# Patient Record
Sex: FEMALE | Race: WHITE | NOT HISPANIC OR LATINO | Employment: OTHER | URBAN - METROPOLITAN AREA
[De-identification: names, ages, dates, MRNs, and addresses within clinical notes are randomized per-mention and may not be internally consistent; named-entity substitution may affect disease eponyms.]

---

## 2017-03-06 PROBLEM — R91.8 LUNG MASS: Status: ACTIVE | Noted: 2017-03-06

## 2017-03-22 ENCOUNTER — OFFICE VISIT (OUTPATIENT)
Dept: PULMONOLOGY | Facility: CLINIC | Age: 57
End: 2017-03-22
Payer: COMMERCIAL

## 2017-03-22 VITALS
DIASTOLIC BLOOD PRESSURE: 83 MMHG | HEART RATE: 71 BPM | HEIGHT: 63 IN | SYSTOLIC BLOOD PRESSURE: 156 MMHG | BODY MASS INDEX: 28.64 KG/M2 | OXYGEN SATURATION: 100 % | WEIGHT: 161.63 LBS

## 2017-03-22 DIAGNOSIS — R91.8 LUNG MASS: Primary | ICD-10-CM

## 2017-03-22 DIAGNOSIS — R05.3 CHRONIC COUGH: ICD-10-CM

## 2017-03-22 PROCEDURE — 1160F RVW MEDS BY RX/DR IN RCRD: CPT | Mod: S$GLB,,, | Performed by: INTERNAL MEDICINE

## 2017-03-22 PROCEDURE — 99999 PR PBB SHADOW E&M-EST. PATIENT-LVL III: CPT | Mod: PBBFAC,,, | Performed by: INTERNAL MEDICINE

## 2017-03-22 PROCEDURE — 3079F DIAST BP 80-89 MM HG: CPT | Mod: S$GLB,,, | Performed by: INTERNAL MEDICINE

## 2017-03-22 PROCEDURE — 99204 OFFICE O/P NEW MOD 45 MIN: CPT | Mod: S$GLB,,, | Performed by: INTERNAL MEDICINE

## 2017-03-22 PROCEDURE — 3077F SYST BP >= 140 MM HG: CPT | Mod: S$GLB,,, | Performed by: INTERNAL MEDICINE

## 2017-03-22 RX ORDER — FLUTICASONE PROPIONATE 50 MCG
2 SPRAY, SUSPENSION (ML) NASAL DAILY
Qty: 16 G | Refills: 6 | Status: SHIPPED | OUTPATIENT
Start: 2017-03-22 | End: 2017-04-21

## 2017-03-22 RX ORDER — CYANOCOBALAMIN 1000 UG/ML
INJECTION, SOLUTION INTRAMUSCULAR; SUBCUTANEOUS
Refills: 0 | COMMUNITY
Start: 2017-02-09 | End: 2017-05-02 | Stop reason: SDUPTHER

## 2017-03-22 NOTE — PROGRESS NOTES
"Subjective:       Patient ID: Alondra Bryan is a 56 y.o. female.    Chief Complaint: Lung Mass    HPI Comments: 56 year old here for second opinion.  Went ot PCP in February with a 6-7 month history of cough that prompted CT of sinsuses and CXR.  Treated with antibiotics.  Cough has improved and is now just a "tickle".  20 pack year history of tobacco use, but quit 20 years ago.      Review of Systems   Constitutional: Negative for fever, chills and night sweats.   HENT: Negative for trouble swallowing.    Eyes: Negative for itching.   Respiratory: Positive for cough and wheezing (once a week). Negative for hemoptysis, chest tightness, shortness of breath and dyspnea on extertion.    Cardiovascular: Negative for chest pain and leg swelling.   Endocrine: Negative for cold intolerance and heat intolerance.    Musculoskeletal: Negative for arthralgias.   Skin: Negative for rash.   Gastrointestinal: Positive for acid reflux (takes protonix every other day for which it controls).   Neurological: Negative for headaches.   Hematological: Negative for adenopathy.   Psychiatric/Behavioral: Negative for confusion.       Past medical and surgical history reviewed.  Social and family history reviewed.  Allergies and medications reviewed.  No past history of pneumonia or thoracic surgery.  Cholecystectomy and hysterectomy and sinus surgery.  Objective:       Vitals:    03/22/17 1100   BP: (!) 156/83   Pulse: 71   SpO2: 100%   Weight: 73.3 kg (161 lb 9.6 oz)   Height: 5' 3" (1.6 m)     Physical Exam   Constitutional: She is oriented to person, place, and time. She appears well-developed and well-nourished.   HENT:   Head: Normocephalic.   Nose: Nose normal.   Neck: Normal range of motion. Neck supple. No tracheal deviation present.   Cardiovascular: Normal rate, regular rhythm, normal heart sounds and intact distal pulses.    Pulmonary/Chest: Normal expansion, symmetric chest wall expansion, effort normal and breath sounds normal. " "  Abdominal: Soft. Bowel sounds are normal. There is no hepatosplenomegaly.   Musculoskeletal: Normal range of motion. She exhibits no edema.   Lymphadenopathy: No supraclavicular adenopathy is present.     She has no cervical adenopathy.   Neurological: She is alert and oriented to person, place, and time. No cranial nerve deficit.   Skin: Skin is warm and dry.   Psychiatric: She has a normal mood and affect.     Personal Diagnostic Review  CT of chest 2/14/2017:  Right lower lobe mass with airways leading up to it.  Possible "comet tail".  Central calcifications.  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    A positive BCL-2   stain would have further supported that but it is negative.  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   No flowsheet data found.      Assessment:       1. Lung mass    2. Chronic cough        Outpatient Encounter Prescriptions as of 3/22/2017   Medication Sig Dispense Refill    ARMOUR THYROID 15 mg Tab once daily.       ARMOUR THYROID 60 mg Tab once daily.   3    b complex vitamins tablet Take 1 tablet by mouth once daily.        COMPOUND HORMONE REPLACEMENT Take by mouth once daily. biest (50/50) prog/test/dhea 2/5/150/1.5/5mg in each arm      cyanocobalamin 1,000 mcg/mL injection   0    doxycycline (ORACEA) 40 mg capsule Take 40 mg by mouth every other day.       lisinopril 10 MG tablet TAKE 1 TABLET BY MOUTH ONCE A DAY 30 tablet 12    meloxicam (MOBIC) 15 MG tablet Take 15 mg by mouth as needed.   12    METHYL-B12/L-MEFOLATE/B6 PHOS (LEVMEFOLATE-B6 PHOS-METHYL-B12 ORAL) Take 1 tablet by mouth once daily.      multivitamin with folic acid (ONE DAILY ESSENTIAL) 400 mcg Tab Take 1 tablet by mouth once daily.      pantoprazole (PROTONIX) 40 MG tablet Take 1 tablet (40 mg total) by " mouth once daily. 30 tablet 0    SOOLANTRA 1 % Crea every other day.       VITAMIN D2 50,000 unit capsule 50,000 Units every 7 days.   0    fluticasone (FLONASE) 50 mcg/actuation nasal spray 2 sprays by Each Nare route once daily. 16 g 6     No facility-administered encounter medications on file as of 3/22/2017.      Orders Placed This Encounter   Procedures    NM PET CT Routine Skull to Mid Thigh     Standing Status:   Future     Standing Expiration Date:   3/22/2018     Order Specific Question:   May the Radiologist modify the order per protocol to meet the clinical needs of the patient?     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  PET/CT  to evaluate for hypermetabolic activity, will need further diagnostic studies if it is.  Flonase for sinus congestion.    Call with results to determine next best.

## 2017-03-28 ENCOUNTER — PATIENT MESSAGE (OUTPATIENT)
Dept: PULMONOLOGY | Facility: CLINIC | Age: 57
End: 2017-03-28

## 2017-03-28 ENCOUNTER — HOSPITAL ENCOUNTER (OUTPATIENT)
Dept: RADIOLOGY | Facility: HOSPITAL | Age: 57
Discharge: HOME | End: 2017-03-28
Attending: INTERNAL MEDICINE
Payer: COMMERCIAL

## 2017-03-28 ENCOUNTER — TELEPHONE (OUTPATIENT)
Dept: PULMONOLOGY | Facility: CLINIC | Age: 57
End: 2017-03-28

## 2017-03-28 DIAGNOSIS — R91.8 LUNG MASS: ICD-10-CM

## 2017-03-28 PROCEDURE — A9552 F18 FDG: HCPCS | Mod: PO

## 2017-03-28 PROCEDURE — 78815 PET IMAGE W/CT SKULL-THIGH: CPT | Mod: 26,PI,, | Performed by: RADIOLOGY

## 2017-03-29 ENCOUNTER — TELEPHONE (OUTPATIENT)
Dept: PULMONOLOGY | Facility: CLINIC | Age: 57
End: 2017-03-29

## 2017-03-29 DIAGNOSIS — R91.8 LUNG MASS: Primary | ICD-10-CM

## 2017-03-29 NOTE — TELEPHONE ENCOUNTER
----- Message from Tricia Hutton sent at 3/29/2017  9:35 AM CDT -----  Contact: Self [phone] (m)  Pt states she missed your call - she is requesting a call at [phone] (m).

## 2017-04-11 ENCOUNTER — ANESTHESIA (OUTPATIENT)
Dept: SURGERY | Facility: HOSPITAL | Age: 57
End: 2017-04-11
Payer: COMMERCIAL

## 2017-04-11 ENCOUNTER — HOSPITAL ENCOUNTER (OUTPATIENT)
Dept: RADIOLOGY | Facility: HOSPITAL | Age: 57
Discharge: HOME | End: 2017-04-11
Attending: INTERNAL MEDICINE | Admitting: INTERNAL MEDICINE
Payer: COMMERCIAL

## 2017-04-11 ENCOUNTER — HOSPITAL ENCOUNTER (OUTPATIENT)
Facility: HOSPITAL | Age: 57
Discharge: HOME | End: 2017-04-11
Attending: INTERNAL MEDICINE | Admitting: INTERNAL MEDICINE
Payer: COMMERCIAL

## 2017-04-11 ENCOUNTER — SURGERY (OUTPATIENT)
Age: 57
End: 2017-04-11

## 2017-04-11 ENCOUNTER — ANESTHESIA EVENT (OUTPATIENT)
Dept: SURGERY | Facility: HOSPITAL | Age: 57
End: 2017-04-11
Payer: COMMERCIAL

## 2017-04-11 VITALS
SYSTOLIC BLOOD PRESSURE: 128 MMHG | TEMPERATURE: 98 F | WEIGHT: 160 LBS | BODY MASS INDEX: 28.35 KG/M2 | DIASTOLIC BLOOD PRESSURE: 70 MMHG | HEIGHT: 63 IN | RESPIRATION RATE: 16 BRPM | HEART RATE: 75 BPM | OXYGEN SATURATION: 99 %

## 2017-04-11 DIAGNOSIS — R91.8 RIGHT LOWER LOBE LUNG MASS: ICD-10-CM

## 2017-04-11 DIAGNOSIS — R91.8 LUNG MASS: ICD-10-CM

## 2017-04-11 PROCEDURE — 25000003 PHARM REV CODE 250: Performed by: INTERNAL MEDICINE

## 2017-04-11 PROCEDURE — 88172 CYTP DX EVAL FNA 1ST EA SITE: CPT | Performed by: PATHOLOGY

## 2017-04-11 PROCEDURE — 25000003 PHARM REV CODE 250: Performed by: STUDENT IN AN ORGANIZED HEALTH CARE EDUCATION/TRAINING PROGRAM

## 2017-04-11 PROCEDURE — 27201423 OPTIME MED/SURG SUP & DEVICES STERILE SUPPLY: Performed by: INTERNAL MEDICINE

## 2017-04-11 PROCEDURE — 36000706: Performed by: INTERNAL MEDICINE

## 2017-04-11 PROCEDURE — D9220A PRA ANESTHESIA: Mod: ,,, | Performed by: ANESTHESIOLOGY

## 2017-04-11 PROCEDURE — 63600175 PHARM REV CODE 636 W HCPCS: Performed by: STUDENT IN AN ORGANIZED HEALTH CARE EDUCATION/TRAINING PROGRAM

## 2017-04-11 PROCEDURE — 88305 TISSUE EXAM BY PATHOLOGIST: CPT | Performed by: PATHOLOGY

## 2017-04-11 PROCEDURE — 71000045 HC DOSC ROUTINE RECOVERY EA ADD'L HR: Performed by: INTERNAL MEDICINE

## 2017-04-11 PROCEDURE — 71000044 HC DOSC ROUTINE RECOVERY FIRST HOUR: Performed by: INTERNAL MEDICINE

## 2017-04-11 PROCEDURE — 31627 NAVIGATIONAL BRONCHOSCOPY: CPT | Mod: ,,, | Performed by: INTERNAL MEDICINE

## 2017-04-11 PROCEDURE — 88305 TISSUE EXAM BY PATHOLOGIST: CPT | Mod: 26,59,, | Performed by: PATHOLOGY

## 2017-04-11 PROCEDURE — 36000707: Performed by: INTERNAL MEDICINE

## 2017-04-11 PROCEDURE — 88305 TISSUE EXAM BY PATHOLOGIST: CPT | Mod: 26,,, | Performed by: PATHOLOGY

## 2017-04-11 PROCEDURE — 37000009 HC ANESTHESIA EA ADD 15 MINS: Performed by: INTERNAL MEDICINE

## 2017-04-11 PROCEDURE — 71250 CT THORAX DX C-: CPT | Mod: TC

## 2017-04-11 PROCEDURE — 71000015 HC POSTOP RECOV 1ST HR: Performed by: INTERNAL MEDICINE

## 2017-04-11 PROCEDURE — 31652 BRONCH EBUS SAMPLNG 1/2 NODE: CPT | Mod: ,,, | Performed by: INTERNAL MEDICINE

## 2017-04-11 PROCEDURE — 31629 BRONCHOSCOPY/NEEDLE BX EACH: CPT | Mod: 59,RT,, | Performed by: INTERNAL MEDICINE

## 2017-04-11 PROCEDURE — 88173 CYTOPATH EVAL FNA REPORT: CPT | Mod: 26,,, | Performed by: PATHOLOGY

## 2017-04-11 PROCEDURE — 71250 CT THORAX DX C-: CPT | Mod: 26,,, | Performed by: RADIOLOGY

## 2017-04-11 PROCEDURE — 88172 CYTP DX EVAL FNA 1ST EA SITE: CPT | Mod: 26,,, | Performed by: PATHOLOGY

## 2017-04-11 PROCEDURE — 31628 BRONCHOSCOPY/LUNG BX EACH: CPT | Mod: 51,RT,, | Performed by: INTERNAL MEDICINE

## 2017-04-11 PROCEDURE — 37000008 HC ANESTHESIA 1ST 15 MINUTES: Performed by: INTERNAL MEDICINE

## 2017-04-11 RX ORDER — LIDOCAINE HCL/PF 100 MG/5ML
SYRINGE (ML) INTRAVENOUS
Status: DISCONTINUED | OUTPATIENT
Start: 2017-04-11 | End: 2017-04-11

## 2017-04-11 RX ORDER — PHENYLEPHRINE HYDROCHLORIDE 10 MG/ML
INJECTION INTRAVENOUS
Status: DISCONTINUED | OUTPATIENT
Start: 2017-04-11 | End: 2017-04-11

## 2017-04-11 RX ORDER — ONDANSETRON 2 MG/ML
INJECTION INTRAMUSCULAR; INTRAVENOUS
Status: DISCONTINUED | OUTPATIENT
Start: 2017-04-11 | End: 2017-04-11

## 2017-04-11 RX ORDER — MIDAZOLAM HYDROCHLORIDE 1 MG/ML
INJECTION, SOLUTION INTRAMUSCULAR; INTRAVENOUS
Status: DISCONTINUED | OUTPATIENT
Start: 2017-04-11 | End: 2017-04-11

## 2017-04-11 RX ORDER — SODIUM CHLORIDE 0.9 % (FLUSH) 0.9 %
3 SYRINGE (ML) INJECTION EVERY 8 HOURS
Status: DISCONTINUED | OUTPATIENT
Start: 2017-04-11 | End: 2017-04-11 | Stop reason: HOSPADM

## 2017-04-11 RX ORDER — LIDOCAINE HYDROCHLORIDE 10 MG/ML
INJECTION INFILTRATION; PERINEURAL
Status: DISCONTINUED | OUTPATIENT
Start: 2017-04-11 | End: 2017-04-11 | Stop reason: HOSPADM

## 2017-04-11 RX ORDER — FENTANYL CITRATE 50 UG/ML
25 INJECTION, SOLUTION INTRAMUSCULAR; INTRAVENOUS EVERY 5 MIN PRN
Status: DISCONTINUED | OUTPATIENT
Start: 2017-04-11 | End: 2017-04-11 | Stop reason: HOSPADM

## 2017-04-11 RX ORDER — SODIUM CHLORIDE 9 MG/ML
INJECTION, SOLUTION INTRAVENOUS CONTINUOUS PRN
Status: DISCONTINUED | OUTPATIENT
Start: 2017-04-11 | End: 2017-04-11

## 2017-04-11 RX ORDER — FENTANYL CITRATE 50 UG/ML
INJECTION, SOLUTION INTRAMUSCULAR; INTRAVENOUS
Status: DISCONTINUED | OUTPATIENT
Start: 2017-04-11 | End: 2017-04-11

## 2017-04-11 RX ORDER — PROPOFOL 10 MG/ML
VIAL (ML) INTRAVENOUS
Status: DISCONTINUED | OUTPATIENT
Start: 2017-04-11 | End: 2017-04-11

## 2017-04-11 RX ORDER — CYANOCOBALAMIN 1000 UG/ML
1000 INJECTION, SOLUTION INTRAMUSCULAR; SUBCUTANEOUS
COMMUNITY

## 2017-04-11 RX ORDER — SODIUM CHLORIDE 0.9 % (FLUSH) 0.9 %
3 SYRINGE (ML) INJECTION
Status: DISCONTINUED | OUTPATIENT
Start: 2017-04-11 | End: 2017-04-11 | Stop reason: HOSPADM

## 2017-04-11 RX ADMIN — FENTANYL CITRATE 25 MCG: 50 INJECTION, SOLUTION INTRAMUSCULAR; INTRAVENOUS at 09:04

## 2017-04-11 RX ADMIN — FENTANYL CITRATE 25 MCG: 50 INJECTION, SOLUTION INTRAMUSCULAR; INTRAVENOUS at 10:04

## 2017-04-11 RX ADMIN — LIDOCAINE HYDROCHLORIDE 16 ML: 10 INJECTION, SOLUTION INFILTRATION; PERINEURAL at 09:04

## 2017-04-11 RX ADMIN — PHENYLEPHRINE HYDROCHLORIDE 150 MCG: 10 INJECTION INTRAVENOUS at 10:04

## 2017-04-11 RX ADMIN — FENTANYL CITRATE 25 MCG: 50 INJECTION INTRAMUSCULAR; INTRAVENOUS at 11:04

## 2017-04-11 RX ADMIN — SODIUM CHLORIDE: 0.9 INJECTION, SOLUTION INTRAVENOUS at 08:04

## 2017-04-11 RX ADMIN — PROPOFOL 130 MG: 10 INJECTION, EMULSION INTRAVENOUS at 09:04

## 2017-04-11 RX ADMIN — PHENYLEPHRINE HYDROCHLORIDE 100 MCG: 10 INJECTION INTRAVENOUS at 10:04

## 2017-04-11 RX ADMIN — FENTANYL CITRATE 50 MCG: 50 INJECTION, SOLUTION INTRAMUSCULAR; INTRAVENOUS at 09:04

## 2017-04-11 RX ADMIN — MIDAZOLAM HYDROCHLORIDE 2 MG: 1 INJECTION, SOLUTION INTRAMUSCULAR; INTRAVENOUS at 08:04

## 2017-04-11 RX ADMIN — PHENYLEPHRINE HYDROCHLORIDE 150 MCG: 10 INJECTION INTRAVENOUS at 09:04

## 2017-04-11 RX ADMIN — PHENYLEPHRINE HYDROCHLORIDE 100 MCG: 10 INJECTION INTRAVENOUS at 09:04

## 2017-04-11 RX ADMIN — LIDOCAINE HYDROCHLORIDE 70 MG: 20 INJECTION, SOLUTION INTRAVENOUS at 09:04

## 2017-04-11 RX ADMIN — ONDANSETRON 4 MG: 2 INJECTION INTRAMUSCULAR; INTRAVENOUS at 10:04

## 2017-04-11 NOTE — IP AVS SNAPSHOT
[facility] [address] [facility]  [address]  [facility] [address]  Phone: [phone]           Patient Discharge Instructions   Our goal is to set you up for success. This packet includes information on your condition, medications, and your home care.  It will help you care for yourself to prevent having to return to the hospital.     Please ask your nurse if you have any questions.      There are many details to remember when preparing to leave the hospital. Here is what you will need to do:    1. Take your medicine. If you are prescribed medications, review your Medication List on the following pages. You may have new medications to  at the pharmacy and others that you'll need to stop taking. Review the instructions for how and when to take your medications. Talk with your doctor or nurses if you are unsure of what to do.     2. Go to your follow-up appointments. Specific follow-up information is listed in the following pages. Your may be contacted by a nurse or clinical provider about future appointments. Be sure we have all of the phone numbers to reach you. Please contact your provider's office if you are unable to make an appointment.     3. Watch for warning signs. Your doctor or nurse will give you detailed warning signs to watch for and when to call for assistance. These instructions may also include educational information about your condition. If you experience any of warning signs to your health, call your doctor.           [facility]  Unless otherwise directed by your provider, please   contact [facility], our nurse care line   that is available for 24/7 assistance.     [phone] (toll-free)     Registered nurses in the [facility]   provide: appointment scheduling, clinical advisement, health education, and other advisory services.                  ** Verify the list of medication(s) below is accurate and up to date. Carry this with you in case of  emergency. If your medications have changed, please notify your healthcare provider.             Medication List      CONTINUE taking these medications        Additional Info                      * ARMOUR THYROID 15 mg Tab   Refills:  0   Generic drug:  thyroid (pork)    Instructions:  once daily.     Begin Date    AM    Noon    PM    Bedtime       * ARMOUR THYROID 60 mg Tab   Refills:  3   Generic drug:  thyroid (pork)    Instructions:  once daily.     Begin Date    AM    Noon    PM    Bedtime       COMPOUND HORMONE REPLACEMENT   Refills:  0    Instructions:  Take by mouth once daily. biest (50/50) prog/test/dhea 2/5/150/1.5/5mg in each arm     Begin Date    AM    Noon    PM    Bedtime       * cyanocobalamin 1,000 mcg/mL injection   Refills:  0   Dose:  1000 mcg    Instructions:  1,000 mcg every 28 days.     Begin Date    AM    Noon    PM    Bedtime       * cyanocobalamin 1,000 mcg/mL injection   Refills:  0      Begin Date    AM    Noon    PM    Bedtime       doxycycline 40 mg capsule   Commonly known as:  ORACEA   Refills:  0   Dose:  40 mg    Instructions:  Take 40 mg by mouth every other day.     Begin Date    AM    Noon    PM    Bedtime       fluticasone 50 mcg/actuation nasal spray   Commonly known as:  FLONASE   Quantity:  16 g   Refills:  6   Dose:  2 spray    Instructions:  2 sprays by Each Nare route once daily.     Begin Date    AM    Noon    PM    Bedtime       LEVMEFOLATE-B6 PHOS-METHYL-B12 ORAL   Refills:  0   Dose:  1 tablet    Instructions:  Take 1 tablet by mouth once daily.     Begin Date    AM    Noon    PM    Bedtime       lisinopril 10 MG tablet   Quantity:  30 tablet   Refills:  12    Instructions:  TAKE 1 TABLET BY MOUTH ONCE A DAY     Begin Date    AM    Noon    PM    Bedtime       meloxicam 15 MG tablet   Commonly known as:  MOBIC   Refills:  12   Dose:  15 mg    Instructions:  Take 15 mg by mouth as needed.     Begin Date    AM    Noon    PM    Bedtime       ONE DAILY ESSENTIAL 400 mcg Tab    Refills:  0   Dose:  1 tablet   Generic drug:  multivitamin with folic acid    Instructions:  Take 1 tablet by mouth once daily.     Begin Date    AM    Noon    PM    Bedtime       pantoprazole 40 MG tablet   Commonly known as:  PROTONIX   Quantity:  30 tablet   Refills:  0   Dose:  40 mg    Instructions:  Take 1 tablet (40 mg total) by mouth once daily.     Begin Date    AM    Noon    PM    Bedtime       SOOLANTRA 1 % Crea   Refills:  0   Generic drug:  ivermectin    Instructions:  every other day.     Begin Date    AM    Noon    PM    Bedtime       VITAMIN D2 50,000 unit Cap   Refills:  0   Dose:  25925 Units   Generic drug:  ergocalciferol    Instructions:  50,000 Units every 7 days.     Begin Date    AM    Noon    PM    Bedtime       * Notice:  This list has 4 medication(s) that are the same as other medications prescribed for you. Read the directions carefully, and ask your doctor or other care provider to review them with you.               Please bring to all follow up appointments:    1. A copy of your discharge instructions.  2. All medicines you are currently taking in their original bottles.  3. Identification and insurance card.    Please arrive 15 minutes ahead of scheduled appointment time.    Please call 24 hours in advance if you must reschedule your appointment and/or time.        Follow-up Information     Call in 1 week to follow up.        Discharge Instructions     Future Orders    Diet general     Questions:    Total calories:      Fat restriction, if any:      Protein restriction, if any:      Na restriction, if any:      Fluid restriction:      Additional restrictions:          Discharge Instructions         Endoscopic Diagnosis of Chest, Lung Problems  Youve been told you need an endoscopic procedure to diagnose a problem in your chest or lung. This procedure allows your healthcare provider to view the airway of your lungs and take a tissue sample (biopsy) or treat a lung condition, if  needed.     With bronchoscopy, a flexible scope allows the healthcare provider to view and biopsy the airway.   Bronchoscopy  A bronchoscopy allows the healthcare provider to look directly into the airways in your lungs. This is done using a bronchoscope. A bronchoscope is a thin, flexible, hollow, lighted tube that lets the doctor see inside the lung. Tools can be passed down the middle of the scope.  Transbronchial biopsy  Transbronchial biopsy (TBB) is a procedure used mainly to take samples of tissue near the airway. This is done using a bronchoscope and tiny forceps. The forceps are passed through the scope into the airway, and a sample is taken.  Endobronchial ultrasound  Endobronchial ultrasound (EBUS) is a type of bronchoscopy. With EBUS, the lungs and the space between the lungs (mediastinum) are looked at using a flexible bronchoscope and ultrasound (images created using sound waves). Ultrasound guides the healthcare provider and allows him or her to see through the airway walls.  Preparing for the procedure  Before your procedure, do the following:  · Follow your healthcare providers instructions about eating and drinking.  · Tell your healthcare provider about the medicines you take. You may need to stop taking some of them before the procedure, especially aspirin, Coumadin, or other blood thinners.  · Talk with your healthcare provider about any allergies and health problems you have.  · Tell your healthcare provider if you are pregnant.  During the procedure  You will get medicine through an intravenous (IV) line to help you relax and sleep during the procedure. You may also receive local anesthesia (numbing medicine) with a needle. Then a special spray is used to numb your throat and nose or mouth. This is to help keep you comfortable and prevent coughing during the procedure.  After the procedure  You are sent to the recovery room until the sedation wears off. This takes about 1 to 2 hours. Once you  "are fully awake, you can go home. You will need an adult family member or friend to drive you home. Your throat will be sore for a day or two. At first, there may be a small amount of blood in your sputum. This is normal. It should go away after the second day.  Risks and complications  · Bleeding  · Infection  · Injury to vocal cords  · Pneumothorax (collapsed lung)   When to call your healthcare provider  · Large amounts of blood in sputum  · Blood in sputum after 2 days  · Shortness of breath  · Chest pain  · Fever of 100.4ºF (38°C) or higher, or as directed by your healthcare provider  · Hoarseness that wont go away   Date Last Reviewed: 11/1/2016  © 8442-0867 [employer]. [address] [place] [address], [place] [address]. All rights reserved. This information is not intended as a substitute for professional medical care. Always follow your healthcare professional's instructions.            Primary Diagnosis     Your primary diagnosis was:  Lung Mass      Admission Information     Date & Time Provider Department CSN    4/11/2017  6:37 AM Garima Llanos MD [facility] [id number]      Care Providers     Provider Role Specialty Primary office phone    Garima Llanos MD Attending Provider Intensive Care [phone]    Garima Llanos MD Surgeon  Intensive Care [phone]      Your Vitals Were     BP Pulse Temp Resp Height Weight    119/56 (BP Location: Left arm, Patient Position: Lying, BP Method: Automatic) 82 97.8 °F (36.6 °C) (Oral) 16 5' 3" (1.6 m) 72.6 kg (160 lb)    SpO2 BMI             100% 28.34 kg/m2         Recent Lab Values     No lab values to display.      Pending Labs     Order Current Status    Cytology Specimen- Pulmonary Medical Cytology Collected (04/11/17 1052)    Specimen to Pathology - Surgery Collected (04/11/17 1052)      Allergies as of 4/11/2017        Reactions    Pcn [Penicillins] Hives      Advance Directives     An advance directive is a document which, in the " event you are no longer able to make decisions for yourself, tells your healthcare team what kind of treatment you do or do not want to receive, or who you would like to make those decisions for you.  If you do not currently have an advance directive, [facility] encourages you to create one.  For more information call:  [phone]), [phone] ([phone]),  or log on to [email or web address].        Smoking Cessation     If you would like to quit smoking:   You may be eligible for free services if you are a [place] resident and started smoking cigarettes before September 1, 1988.  Call the Smoking Cessation Trust (SCT) toll free at (108) 691-6875 or (279) 764-1321.   Call 6-434-QUIT-NOW if you do not meet the above criteria.   Contact us via email: [email or web address]   View our website for more information: [email or web address]        Language Assistance Services     ATTENTION: Language assistance services are available, free of charge. Please call [phone].      ATENCIÓN: Si habla español, tiene a shelley disposición servicios gratuitos de asistencia lingüística. Llame al [phone].     MARCOS Ý: N?u b?n nói Ti?ng Vi?t, có các d?ch v? h? tr? ngôn ng? mi?n phí dành cho b?n. G?i s? [phone].         [facility] complies with applicable Federal civil rights laws and does not discriminate on the basis of race, color, national origin, age, disability, or sex.

## 2017-04-11 NOTE — DISCHARGE SUMMARY
[facility]  Discharge Summary      Admit Date: 4/11/2017    Discharge Date and Time:  04/11/2017 1:51 PM    Attending Physician: Dr. Garima Llanos    Reason for Admission: Bronchoscopy with navigation    Procedures Performed: Procedure(s) (LRB):  ENDOBRONCHIAL NAVIGATION (N/A)  ENDOBRONCHIAL ULTRASOUND (EBUS) (N/A)  BRONCHOSCOPY (N/A)    Hospital Course (synopsis of major diagnoses, care, treatment, and services provided during the course of the hospital stay): Bronchoscopy complete.  Post procedure CXR without pneumothorax     Consults: none    Significant Diagnostic Studies: Bronchoscopy: see note    Final Diagnoses:    Principal Problem: Lung mass   Secondary Diagnoses:   Active Hospital Problems    Diagnosis  POA    *Lung mass [R91.8]  Yes    Right lower lobe lung mass [R91.8]  Yes      Resolved Hospital Problems    Diagnosis Date Resolved POA   No resolved problems to display.       Discharged Condition: stable    Disposition: Home or Self Care    Follow Up/Patient Instructions:     Medications:  Reconciled Home Medications:   Discharge Medication List as of 4/11/2017 11:19 AM      CONTINUE these medications which have NOT CHANGED    Details   !! ARMOUR THYROID 15 mg Tab once daily. , Starting 8/23/2016, Until Discontinued, Historical Med      !! ARMOUR THYROID 60 mg Tab once daily. , Starting 12/16/2016, Until Discontinued, Historical Med      COMPOUND HORMONE REPLACEMENT Take by mouth once daily. biest (50/50) prog/test/dhea 2/5/150/1.5/5mg in each arm, Until Discontinued, Historical Med      !! cyanocobalamin 1,000 mcg/mL injection Starting 2/9/2017, Until Discontinued, Historical Med      !! cyanocobalamin 1,000 mcg/mL injection 1,000 mcg every 28 days., Until Discontinued, Historical Med      doxycycline (ORACEA) 40 mg capsule Take 40 mg by mouth every other day. , Starting 9/8/2016, Until Discontinued, Historical Med      fluticasone (FLONASE) 50 mcg/actuation nasal spray 2 sprays by  Each Nare route once daily., Starting 3/22/2017, Until Fri 4/21/17, Normal      lisinopril 10 MG tablet TAKE 1 TABLET BY MOUTH ONCE A DAY, Normal      meloxicam (MOBIC) 15 MG tablet Take 15 mg by mouth as needed. , Starting 1/13/2017, Until Discontinued, Historical Med      METHYL-B12/L-MEFOLATE/B6 PHOS (LEVMEFOLATE-B6 PHOS-METHYL-B12 ORAL) Take 1 tablet by mouth once daily., Until Discontinued, Historical Med      multivitamin with folic acid (ONE DAILY ESSENTIAL) 400 mcg Tab Take 1 tablet by mouth once daily., Until Discontinued, Historical Med      pantoprazole (PROTONIX) 40 MG tablet Take 1 tablet (40 mg total) by mouth once daily., Starting 2/8/2017, Until Thu 2/8/18, Normal      SOOLANTRA 1 % Crea every other day. , Starting 8/2/2016, Until Discontinued, Historical Med      VITAMIN D2 50,000 unit capsule 50,000 Units every 7 days. , Starting 12/18/2014, Until Discontinued, Historical Med       !! - Potential duplicate medications found. Please discuss with provider.          Discharge Procedure Orders  Diet general       Follow-up Information     Call in 1 week to follow up.

## 2017-04-11 NOTE — DISCHARGE INSTRUCTIONS
Endoscopic Diagnosis of Chest, Lung Problems  Youve been told you need an endoscopic procedure to diagnose a problem in your chest or lung. This procedure allows your healthcare provider to view the airway of your lungs and take a tissue sample (biopsy) or treat a lung condition, if needed.     With bronchoscopy, a flexible scope allows the healthcare provider to view and biopsy the airway.   Bronchoscopy  A bronchoscopy allows the healthcare provider to look directly into the airways in your lungs. This is done using a bronchoscope. A bronchoscope is a thin, flexible, hollow, lighted tube that lets the doctor see inside the lung. Tools can be passed down the middle of the scope.  Transbronchial biopsy  Transbronchial biopsy (TBB) is a procedure used mainly to take samples of tissue near the airway. This is done using a bronchoscope and tiny forceps. The forceps are passed through the scope into the airway, and a sample is taken.  Endobronchial ultrasound  Endobronchial ultrasound (EBUS) is a type of bronchoscopy. With EBUS, the lungs and the space between the lungs (mediastinum) are looked at using a flexible bronchoscope and ultrasound (images created using sound waves). Ultrasound guides the healthcare provider and allows him or her to see through the airway walls.  Preparing for the procedure  Before your procedure, do the following:  · Follow your healthcare providers instructions about eating and drinking.  · Tell your healthcare provider about the medicines you take. You may need to stop taking some of them before the procedure, especially aspirin, Coumadin, or other blood thinners.  · Talk with your healthcare provider about any allergies and health problems you have.  · Tell your healthcare provider if you are pregnant.  During the procedure  You will get medicine through an intravenous (IV) line to help you relax and sleep during the procedure. You may also receive local anesthesia (numbing medicine)  with a needle. Then a special spray is used to numb your throat and nose or mouth. This is to help keep you comfortable and prevent coughing during the procedure.  After the procedure  You are sent to the recovery room until the sedation wears off. This takes about 1 to 2 hours. Once you are fully awake, you can go home. You will need an adult family member or friend to drive you home. Your throat will be sore for a day or two. At first, there may be a small amount of blood in your sputum. This is normal. It should go away after the second day.  Risks and complications  · Bleeding  · Infection  · Injury to vocal cords  · Pneumothorax (collapsed lung)   When to call your healthcare provider  · Large amounts of blood in sputum  · Blood in sputum after 2 days  · Shortness of breath  · Chest pain  · Fever of 100.4ºF (38°C) or higher, or as directed by your healthcare provider  · Hoarseness that wont go away   Date Last Reviewed: 11/1/2016  © 3890-1408 [employer]. [address] [place] [address], [place] [address]. All rights reserved. This information is not intended as a substitute for professional medical care. Always follow your healthcare professional's instructions.

## 2017-04-11 NOTE — INTERVAL H&P NOTE
The patient has been examined and the H&P has been reviewed:    I concur with the findings and no changes have occurred since H&P was written.    Anesthesia/Surgery risks, benefits and alternative options discussed and understood by patient/family.          Active Hospital Problems    Diagnosis  POA    *Lung mass [R91.8]  Yes      Resolved Hospital Problems    Diagnosis Date Resolved POA   No resolved problems to display.     Patient oriented to procedure and patient verbalized an understanding.  All questions were answered to patient's satisfaction.  Written consent was signed and will be placed on chart.

## 2017-04-11 NOTE — ANESTHESIA PREPROCEDURE EVALUATION
"                                                                                                             04/11/2017  Alondra Bryan is a 57 y.o., female. With PMh of 56 year old here for second opinion. Went ot PCP in February with a 6-7 month history of cough that prompted CT of sinsuses and CXR. Treated with antibiotics. Cough has improved and is now just a "tickle". 20 pack year history of tobacco use, but quit 20 years ago found to have 6g5o5in lung mass in right upper lobe on CT     Pre-operative evaluation for Procedure(s) (LRB):  ENDOBRONCHIAL NAVIGATION (N/A)    Alondra Bryan is a 57 y.o. female     Patient Active Problem List   Diagnosis    Tendonitis of wrist, left    Hypertension    Family history of early CAD    Metatarsalgia    Hallux abducto valgus    Neuroma    Recurrent dislocation of foot    Lung mass       Review of patient's allergies indicates:   Allergen Reactions    Pcn [penicillins] Hives       No current facility-administered medications on file prior to encounter.      Current Outpatient Prescriptions on File Prior to Encounter   Medication Sig Dispense Refill    ARMOUR THYROID 15 mg Tab once daily.       ARMOUR THYROID 60 mg Tab once daily.   3    b complex vitamins tablet Take 1 tablet by mouth once daily.        COMPOUND HORMONE REPLACEMENT Take by mouth once daily. biest (50/50) prog/test/dhea 2/5/150/1.5/5mg in each arm      cyanocobalamin 1,000 mcg/mL injection   0    doxycycline (ORACEA) 40 mg capsule Take 40 mg by mouth every other day.       fluticasone (FLONASE) 50 mcg/actuation nasal spray 2 sprays by Each Nare route once daily. 16 g 6    lisinopril 10 MG tablet TAKE 1 TABLET BY MOUTH ONCE A DAY 30 tablet 12    meloxicam (MOBIC) 15 MG tablet Take 15 mg by mouth as needed.   12    METHYL-B12/L-MEFOLATE/B6 PHOS (LEVMEFOLATE-B6 PHOS-METHYL-B12 ORAL) Take 1 tablet by mouth once daily.      multivitamin with folic acid (ONE DAILY ESSENTIAL) 400 mcg Tab Take 1 tablet " by mouth once daily.      pantoprazole (PROTONIX) 40 MG tablet Take 1 tablet (40 mg total) by mouth once daily. 30 tablet 0    SOOLANTRA 1 % Crea every other day.       VITAMIN D2 50,000 unit capsule 50,000 Units every 7 days.   0       Past Surgical History:   Procedure Laterality Date    BREAST SURGERY Bilateral     breast biopsy    CHOLECYSTECTOMY N/A     HYSTERECTOMY  2000    fibroids    SINUS SURGERY         Social History     Social History    Marital status: Single     Spouse name: N/A    Number of children: N/A    Years of education: N/A     Occupational History    [employer]     Social History Main Topics    Smoking status: Former Smoker     Packs/day: 1.00     Years: 30.00     Quit date: 1/1/1984    Smokeless tobacco: Not on file      Comment: quit 20 yrs ago    Alcohol use 1.2 oz/week     2 Glasses of wine per week      Comment: drink wine every day    Drug use: No    Sexual activity: Yes     Partners: Male     Birth control/ protection: Surgical, Other-see comments      Comment: Hysterectomy     Other Topics Concern    Not on file     Social History Narrative    Patient is a [employer] worker.         Vital Signs Range (Last 24H):  BP: ()/()   Arterial Line BP: ()/()       CBC: No results for input(s): WBC, RBC, HGB, HCT, PLT, MCV, MCH, MCHC in the last 72 hours.    CMP: No results for input(s): NA, K, CL, CO2, BUN, CREATININE, GLU, MG, PHOS, CALCIUM, ALBUMIN, PROT, ALKPHOS, ALT, AST, BILITOT in the last 72 hours.    INR  No results for input(s): INR, PROTIME, APTT in the last 72 hours.    Invalid input(s): PT        Diagnostic Studies:  PET CT (3/22/17):  Findings:  Comparison is made to the prior CT dated 02/14/2017.    HEAD/NECK:  No significant abnormal hypermetabolic foci are identified within the head or neck regions.  No lymphadenopathy is present.    CHEST:  There is a spiculated markedly hypermetabolic mass in the superior segment of the right hepatic lobe which  abuts the major fissure and also extends into the posterior aspect of the right upper lobe beyond the fissure.  The mass is highly suspicious for malignancy.  The mass has grown since the recent CT study and now measures 5.3 x 4.4 x 3.5 cm compared to 5.0 x 4.1 x 3.3 cm previously.  The mass is significantly hypermetabolic with a max SUV of 8.5 and a hypermetabolic volume of 23.2 cc.  Though the recent CT guided biopsy revealed benign results, the biopsy should most likely be regarded as a false negative.  No other significant abnormal hypermetabolic foci are identified within the chest.  No hypermetabolic lymphadenopathy, pleural effusion, or pericardial effusion are present.    ABDOMEN/PELVIS:  No significant abnormal hypermetabolic foci are identified within the abdomen or pelvis.  No lymphadenopathy or ascites are present.  The adrenal glands are normal.    SKELETON:  No significant abnormal hypermetabolic foci are identified within the skeleton.  There are no findings to suggest osseous neoplastic disease.   Impression    Spiculated markedly hypermetabolic mass in the superior segment of the right lower lobe which is highly suspicious for malignancy.  There is no evidence of metastatic disease.        Electronically signed by: Abebe Rolle  Date: 03/28/17  Time: 10:44          EKG:      Stress Echo:  PRE-TEST DATA   EKG: Resting electrocardiogram reveals sinus rhythm at a rate of 78 bpm.     TEST DESCRIPTION   The patient exercised for 7.28 minutes on a High Ramp protocol, corresponding to a functional capacity of 13 estimated METS, achieving a peak heart rate of 155 bpm, which is 98% of the age predicted maximum heart rate.     There were no significant electrocardiographic changes throughout the protocol suggesting ischemia.     EKG Conclusions:    1. The EKG portion of this study is negative for ischemia at a high workload, and peak heart rate of 155 bpm (98% of predicted).   2. Blood pressure response to  exercise was normal (Presenting BP: 117/74 Peak BP: 157/59).   3. No significant arrhythmias were present.   4. There were no symptoms of chest discomfort or significant dyspnea throughout the protocol.   5. The Gallegos treadmill score was 7 suggesting a low probability for future cardiovascular events.    Echocardiographic Description:  Technical Quality: This is a technically good study.     Aorta: The aortic root is normal in size, measuring 2.4 cm at sinotubular junction. The proximal ascending aorta is normal in size, measuring 2.3 cm across.     Left Atrium: The left atrial volume index is normal, measuring 11.69 cc/m2.     Left Ventricle: The left ventricle is normal in size, with an end-diastolic diameter of 3.8 cm, and an end-systolic diameter of 2.5 cm. LV wall thickness is normal, with the septum measuring 1.0 cm and the posterior wall measuring 1.1 cm across. Relative   wall thickness was increased at 0.58, and the LV mass index was 79.4 g/m2 consistent with concentric remodeling. Global left ventricular systolic function appears normal. Visually estimated ejection fraction is 60-65%. The LV Doppler derived stroke   volume equals 76.0 ccs.   The E/e'(lat) is 6, consistent with normal diastolic function.     Right Atrium: The right atrium is normal in size, measuring 3.5 cm in length and 2.0 cm in width in the apical view.     Right Ventricle: The right ventricle is normal in size. Global right ventricular systolic function appears normal. The estimated PA systolic pressure is greater than 36 mmHg.     Aortic Valve:  The peak gradient obtained across the aortic valve is 6.0 mmHg, with a mean gradient of 2.0 mmHg. Using a left ventricular outflow tract diameter of 2.0 cm, a left ventricular outflow tract velocity time integral of 24.68 cm, and a peak   instantaneous transvalvular velocity of  m/s, the calculated aortic valve area is 2.08 cm2.     Mitral Valve:  The pressure half time is 71.0 msec. The  calculated mitral valve area is 3.1 cm2.     Intracavitary: There is no evidence of pericardial effusion, intracavity mass, thrombi, or vegetation.     Post Exercise Imaging:    Immediate post exercise images demonstrate left ventricular function augmenting with the ejection fraction becoming 78%. Right ventricular function augments.     No exercise induced wall motion abnormalities were identified.       CONCLUSIONS     1 - Concentric remodeling.     2 - Normal left ventricular systolic function (EF 60-65%).     3 - Normal left ventricular diastolic function.     4 - Normal right ventricular systolic function .     No evidence of stress induced myocardial ischemia.     This document has been electronically    SIGNED BY: Jack Solis MD On: 02/09/2015 11:25      [facility] Anesthesia Evaluation    I have reviewed the Patient Summary Reports.     I have reviewed the Medications.     Review of Systems  Anesthesia Hx:   Denies Personal Hx of Anesthesia complications.   Social:  Former Smoker, Alcohol Use    Cardiovascular:  Cardiovascular Normal Exercise tolerance: good     Renal/:  Renal/ Normal     Hepatic/GI:  Hepatic/GI Normal    Endocrine:   Hypothyroidism        Physical Exam   Airway/Jaw/Neck:  Airway Findings: Mouth Opening: Normal Tongue: Normal  General Airway Assessment: Adult, Good  TM Distance: Normal, at least 6 cm       Chest/Lungs:  Chest/Lungs Findings: Normal Respiratory Rate         Mental Status:  Mental Status Findings:  Cooperative, Alert and Oriented         Anesthesia Plan  Type of Anesthesia, risks & benefits discussed:  Anesthesia Type:  general  Patient's Preference: General  Intra-op Monitoring Plan: standard ASA monitors  Intra-op Monitoring Plan Comments:   Post Op Pain Control Plan:   Post Op Pain Control Plan Comments: Per primary service  Induction:   IV  Beta Blocker:  Patient is not currently on a Beta-Blocker (No further documentation required).       Informed Consent: Patient  understands risks and agrees with Anesthesia plan.  Questions answered. Anesthesia consent signed with patient.  ASA Score: 3     Day of Surgery Review of History & Physical:    H&P update referred to the surgeon.         Ready For Surgery From Anesthesia Perspective.

## 2017-04-11 NOTE — ANESTHESIA RELEASE NOTE
"Anesthesia Release from PACU Note    Patient: Alondra Bryan    Procedure(s) Performed: Procedure(s) (LRB):  ENDOBRONCHIAL NAVIGATION (N/A)  ENDOBRONCHIAL ULTRASOUND (EBUS) (N/A)  BRONCHOSCOPY (N/A)    Anesthesia type: general    Post pain: Adequate analgesia    Post assessment: no apparent anesthetic complications, tolerated procedure well and no evidence of recall    Last Vitals:   Visit Vitals    /73 (BP Location: Left arm, Patient Position: Lying, BP Method: Automatic)    Pulse 73    Temp 36.6 °C (97.8 °F) (Oral)    Resp 16    Ht 5' 3" (1.6 m)    Wt 72.6 kg (160 lb)    SpO2 96%    Breastfeeding No    BMI 28.34 kg/m2       Post vital signs: stable    Level of consciousness: awake, alert  and oriented    Nausea/Vomiting: no nausea/no vomiting    Complications: none    Airway Patency: patent    Respiratory: unassisted, spontaneous ventilation, room air    Cardiovascular: stable and blood pressure at baseline    Hydration: euvolemic  "

## 2017-04-11 NOTE — PLAN OF CARE
Discharge instructions given to pt and fiance . Verbalized understanding. Pt tolerated fluids. No complaints at this time. Pt adequate for discharge. Consents in chart

## 2017-04-13 ENCOUNTER — PATIENT MESSAGE (OUTPATIENT)
Dept: PULMONOLOGY | Facility: CLINIC | Age: 57
End: 2017-04-13

## 2017-04-17 ENCOUNTER — PATIENT MESSAGE (OUTPATIENT)
Dept: PULMONOLOGY | Facility: CLINIC | Age: 57
End: 2017-04-17

## 2017-04-18 ENCOUNTER — PATIENT MESSAGE (OUTPATIENT)
Dept: PULMONOLOGY | Facility: CLINIC | Age: 57
End: 2017-04-18

## 2017-04-18 NOTE — TELEPHONE ENCOUNTER
57 year old with occasional night sweats.  Cough that is productive of thin and clear tan phlegm at times.  Cough has improved with mucinex and delsym.  Will present to multi-d conference to determine next best step.  IR biopsy and bronchoscopy negative

## 2017-04-19 ENCOUNTER — DOCUMENTATION ONLY (OUTPATIENT)
Dept: CARDIOTHORACIC SURGERY | Facility: CLINIC | Age: 57
End: 2017-04-19

## 2017-04-19 NOTE — PATIENT CARE CONFERENCE
"[facility]      THORACIC MULTIDISCIPLINARY TUMOR BOARD  PATIENT REVIEW FORM  ________________________________________________________________________    CLINIC #: [id number]  DATE: 04/19/2017    TUMOR SITE:   Lung Mass    PRESENTER:   Dr. Llanos    PATIENT SUMMARY:   55 y/o presented with a cough, CXR on 2/8/17 revealed "patchy peribronchial vascular thickening suspected right perihilar, infrahilar and posteriorly on the lateral view."   CT chest performed on 2/14/17 revealed masslike density predominantly in the superior segment of the right lower lobe it also extends into the posterior segment of the right upper lobe.   PET performed on 3/28/17 revealed The mass has grown since the recent CT study and now measures 5.3 x 4.4 x 3.5 cm compared to 5.0 x 4.1 x 3.3 cm previously. The mass is significantly hypermetabolic with a max SUV of 8.5 and a hypermetabolic volume of 23.2 cc.  Navigational bronch performed on 4/11/17 was nondiagnostic for malignancy. CT Veran on 4/11/17 revealed "This measures a little larger in size on the current examination with maximum transverse dimension 4.9 cm compared to 4.1 cm on prior exam".    BOARD RECOMMENDATIONS:   Obtain PFT's and refer to thoracic surgery.    CONSULT NEEDED:     [x] Surgery    [] Hem/Onc    [] Rad/Onc    [] Dietary                 [] Social Service    [] Psychology       [] Pulmonology    Clinical Stage: Tumor  Node(s)  Metastasis   Pathologic Stage: Tumor  Node(s)  Metastasis       GROUP STAGE:     [] O    [] 1A    [] IB    [] IIA    [] IIB     [] IIIA     [] IIIB     [] IIIC    [] IV                               [] Local recurrence     [] Regional recurrence     [] Distant recurrence                   [] NSCLC     [] SCLC     Tumor type     Unstageable:      [] Yes     [] No  Metastatic site(s):          [x] [facility] Treatment Guidelines reviewed and care planned is consistent with guidelines.         (i.e., NCCN, NCI, PD, ACO, AUA, etc.)    PRESENTATION " AT CANCER CONFERENCE:         [] Prospective    [] Retrospective     [] Follow-Up          [] Eligible for clinical trial

## 2017-04-19 NOTE — TELEPHONE ENCOUNTER
Spoke to patient regarding multi-disciplinary conference.  Patient has dense fibrotic tissue which makes biopsy difficult (broke my probes during navigation due to dense tissue).  Patient has been treated with antibiotics without resolution, and perhaps growth.  PET/CT is hypermetabolic and concerning for malignancy.  5 pulmonologists, a radiologist and two thoracic surgeons agree that a surgical biopsy to definitively rule out cancer is necessary.  Patient states that she would like to seek another opinion prior to seeing surgeon.  She will call us in 24 hours with her decision

## 2017-04-20 ENCOUNTER — TELEPHONE (OUTPATIENT)
Dept: CARDIOTHORACIC SURGERY | Facility: CLINIC | Age: 57
End: 2017-04-20

## 2017-04-20 DIAGNOSIS — R91.8 RIGHT LOWER LOBE LUNG MASS: Primary | ICD-10-CM

## 2017-04-20 NOTE — TELEPHONE ENCOUNTER
"Received a referral from Dr. Llanos, pt's case presented in thoracic tumor board conference on 4/19/17.   Pt presented with a cough, CXR on 2/8/17 revealed "patchy peribronchial vascular thickening suspected right perihilar, infrahilar and posteriorly on the lateral view."   CT chest performed on 2/14/17 revealed masslike density predominantly in the superior segment of the right lower lobe it also extends into the posterior segment of the right upper lobe.   PET performed on 3/28/17 revealed The mass has grown since the recent CT study and now measures 5.3 x 4.4 x 3.5 cm compared to 5.0 x 4.1 x 3.3 cm previously. The mass is significantly hypermetabolic with a max SUV of 8.5 and a hypermetabolic volume of 23.2 cc.  Navigational bronch performed on 4/11/17 was nondiagnostic for malignancy. CT Veran on 4/11/17 revealed "This measures a little larger in size on the current examination with maximum transverse dimension 4.9 cm compared to 4.1 cm on prior exam".    Discussed imaging with Dr. Angelo, CT chest with/without from 2/14/17 and PET from 3/28/17 are both fine - no additional/updated imaging needed.  Spoke with the pt, she prefers to be seen at the [facility], scheduled for 5/2 due to pt's availability. PFT's scheduled for 920a, Dr. Angelo at 1030. Will mail both appts. Updated Dr. Llanos on scheduled appt.  "

## 2017-05-02 ENCOUNTER — OFFICE VISIT (OUTPATIENT)
Dept: CARDIOTHORACIC SURGERY | Facility: CLINIC | Age: 57
End: 2017-05-02
Payer: COMMERCIAL

## 2017-05-02 ENCOUNTER — TELEPHONE (OUTPATIENT)
Dept: CARDIOTHORACIC SURGERY | Facility: CLINIC | Age: 57
End: 2017-05-02

## 2017-05-02 ENCOUNTER — PROCEDURE VISIT (OUTPATIENT)
Dept: PULMONOLOGY | Facility: CLINIC | Age: 57
End: 2017-05-02
Payer: COMMERCIAL

## 2017-05-02 VITALS
WEIGHT: 158.75 LBS | SYSTOLIC BLOOD PRESSURE: 131 MMHG | TEMPERATURE: 98 F | BODY MASS INDEX: 28.12 KG/M2 | HEART RATE: 71 BPM | DIASTOLIC BLOOD PRESSURE: 84 MMHG

## 2017-05-02 DIAGNOSIS — R91.8 MASS OF LOWER LOBE OF RIGHT LUNG: Primary | ICD-10-CM

## 2017-05-02 DIAGNOSIS — R91.8 RIGHT LOWER LOBE LUNG MASS: Primary | ICD-10-CM

## 2017-05-02 DIAGNOSIS — I10 ESSENTIAL HYPERTENSION: Primary | ICD-10-CM

## 2017-05-02 DIAGNOSIS — R91.8 RIGHT LOWER LOBE LUNG MASS: ICD-10-CM

## 2017-05-02 PROCEDURE — 94729 DIFFUSING CAPACITY: CPT | Mod: S$GLB,,, | Performed by: INTERNAL MEDICINE

## 2017-05-02 PROCEDURE — 99204 OFFICE O/P NEW MOD 45 MIN: CPT | Mod: S$GLB,,, | Performed by: THORACIC SURGERY (CARDIOTHORACIC VASCULAR SURGERY)

## 2017-05-02 PROCEDURE — 94726 PLETHYSMOGRAPHY LUNG VOLUMES: CPT | Mod: 51,S$GLB,, | Performed by: INTERNAL MEDICINE

## 2017-05-02 PROCEDURE — 3079F DIAST BP 80-89 MM HG: CPT | Mod: S$GLB,,, | Performed by: THORACIC SURGERY (CARDIOTHORACIC VASCULAR SURGERY)

## 2017-05-02 PROCEDURE — 3075F SYST BP GE 130 - 139MM HG: CPT | Mod: S$GLB,,, | Performed by: THORACIC SURGERY (CARDIOTHORACIC VASCULAR SURGERY)

## 2017-05-02 PROCEDURE — 94060 EVALUATION OF WHEEZING: CPT | Mod: S$GLB,,, | Performed by: INTERNAL MEDICINE

## 2017-05-02 PROCEDURE — 99999 PR PBB SHADOW E&M-EST. PATIENT-LVL III: CPT | Mod: PBBFAC,,, | Performed by: THORACIC SURGERY (CARDIOTHORACIC VASCULAR SURGERY)

## 2017-05-02 PROCEDURE — 1160F RVW MEDS BY RX/DR IN RCRD: CPT | Mod: S$GLB,,, | Performed by: THORACIC SURGERY (CARDIOTHORACIC VASCULAR SURGERY)

## 2017-05-02 NOTE — LETTER
May 4, 2017      Garima Llanos MD  [address]  [facility] [address]           [facility] Thoracic [facility]  [address] [place] [address]  Phone: [phone]  Fax: [phone]          Patient: Alondra Bryan   MR Number: [id number]   YOB: 1960   Date of Visit: 5/2/2017       Dear Dr. Garima Llanos:    Thank you for referring Alondra Bryan to me for evaluation. Attached you will find relevant portions of my assessment and plan of care.    If you have questions, please do not hesitate to call me. I look forward to following Alondra Bryan along with you.    Sincerely,    Lawson Angelo MD    Enclosure  CC:  No Recipients    If you would like to receive this communication electronically, please contact [email or web address] or [phone] to request more information on [employer] access.    For providers and/or their staff who would like to refer a patient to [facility], please contact us through our one-stop-shop provider referral line, [facility], at [phone].    If you feel you have received this communication in error or would no longer like to receive these types of communications, please e-mail [email or web address]

## 2017-05-02 NOTE — MR AVS SNAPSHOT
[facility]  [id number] [place] [address]  [place] [address]  Phone: [phone]  Fax: [phone]                  Alondra Bryan   2017 10:30 AM   Office Visit    Description:  Female : 1960   Provider:  Lawson Angelo MD   Department:  [facility]           Reason for Visit     Consult                To Do List           Goals (5 Years of Data)     None      [facility]     [facility] On Call Nurse Care Line -  Assistance  Unless otherwise directed by your provider, please contact [facility], our nurse care line that is available for  assistance.     Registered nurses in the [facility] provide: appointment scheduling, clinical advisement, health education, and other advisory services.  Call: [phone] (toll free)               Medications           Message regarding Medications     Verify the changes and/or additions to your medication regime listed below are the same as discussed with your clinician today.  If any of these changes or additions are incorrect, please notify your healthcare provider.             Verify that the below list of medications is an accurate representation of the medications you are currently taking.  If none reported, the list may be blank. If incorrect, please contact your healthcare provider. Carry this list with you in case of emergency.           Current Medications     ARMOUR THYROID 15 mg Tab once daily.     ARMOUR THYROID 60 mg Tab once daily.     COMPOUND HORMONE REPLACEMENT Take by mouth once daily. biest (50/50) prog/test/dhea 2/5/150/1.5/5mg in each arm    cyanocobalamin 1,000 mcg/mL injection 1,000 mcg every 28 days.    doxycycline (ORACEA) 40 mg capsule Take 40 mg by mouth every other day.     lisinopril 10 MG tablet TAKE 1 TABLET BY MOUTH ONCE A DAY    meloxicam (MOBIC) 15 MG tablet Take 15 mg by mouth as needed.     METHYL-B12/L-MEFOLATE/B6 PHOS (LEVMEFOLATE-B6 PHOS-METHYL-B12 ORAL) Take 1  tablet by mouth once daily.    multivitamin with folic acid (ONE DAILY ESSENTIAL) 400 mcg Tab Take 1 tablet by mouth once daily.    pantoprazole (PROTONIX) 40 MG tablet Take 1 tablet (40 mg total) by mouth once daily.    SOOLANTRA 1 % Crea every other day.     VITAMIN D2 50,000 unit capsule 50,000 Units every 7 days.            Clinical Reference Information           Your Vitals Were     BP Pulse Temp Weight BMI    131/84 71 98.1 °F (36.7 °C) (Oral) 72 kg (158 lb 11.7 oz) 28.12 kg/m2      Blood Pressure          Most Recent Value    BP  131/84      Allergies as of 5/2/2017     Pcn [Penicillins]      Immunizations Administered on Date of Encounter - 5/2/2017     None      Language Assistance Services     ATTENTION: Language assistance services are available, free of charge. Please call [phone].      ATENCIÓN: Si gallo lemos, tiene a shelley disposición servicios gratuitos de asistencia lingüística. Llame al [phone].     CHÚ Ý: N?u b?n nói Ti?ng Vi?t, có các d?ch v? h? tr? ngôn ng? mi?n phí dành cho b?n. G?i s? [phone].         [facility] complies with applicable Federal civil rights laws and does not discriminate on the basis of race, color, national origin, age, disability, or sex.

## 2017-05-02 NOTE — TELEPHONE ENCOUNTER
Returned call to set up preop appointments.  Agreeable to start appointments on 5/17 @ 9:00 am.  Map to area and appointment slip mailed.

## 2017-05-02 NOTE — PROGRESS NOTES
Patient ID: Alondra Bryan is a 57 y.o. female.    Chief Complaint: Consult (RLL Lung mass)      HPI:  HPI Comments: She presents for evaluation of a progressive RLL lung mass.  The lesion was seen in February at which time the patient presented with a persistent cough.  A f/u chest CT showed an ~8mm size increase over a 2 month time course.  A navigational bronchoscopy was negative for malignancy.  I reviewed the images which show a nearly 5cm RLL medial basilar lung mass nearly abutting the mediastinal pleural surface.  It contains a focal area of calcification and has irregular borders.      Review of Systems   Constitutional: Negative.    HENT: Positive for postnasal drip.    Eyes: Negative.    Respiratory: Positive for cough.    Cardiovascular: Negative.    Gastrointestinal: Negative.    Endocrine: Negative.    Musculoskeletal: Negative.    Skin: Negative.    Allergic/Immunologic: Negative.    Neurological: Negative.    Hematological: Negative.    Psychiatric/Behavioral: Negative.        Current Outpatient Prescriptions   Medication Sig Dispense Refill    ARMOUR THYROID 15 mg Tab once daily.       ARMOUR THYROID 60 mg Tab once daily.   3    COMPOUND HORMONE REPLACEMENT Take by mouth once daily. biest (50/50) prog/test/dhea 2/5/150/1.5/5mg in each arm      cyanocobalamin 1,000 mcg/mL injection 1,000 mcg every 28 days.      doxycycline (ORACEA) 40 mg capsule Take 40 mg by mouth every other day.       lisinopril 10 MG tablet TAKE 1 TABLET BY MOUTH ONCE A DAY 30 tablet 12    meloxicam (MOBIC) 15 MG tablet Take 15 mg by mouth as needed.   12    METHYL-B12/L-MEFOLATE/B6 PHOS (LEVMEFOLATE-B6 PHOS-METHYL-B12 ORAL) Take 1 tablet by mouth once daily.      multivitamin with folic acid (ONE DAILY ESSENTIAL) 400 mcg Tab Take 1 tablet by mouth once daily.      pantoprazole (PROTONIX) 40 MG tablet Take 1 tablet (40 mg total) by mouth once daily. 30 tablet 0    SOOLANTRA 1 % Crea every other day.       VITAMIN D2  50,000 unit capsule 50,000 Units every 7 days.   0     No current facility-administered medications for this visit.        Review of patient's allergies indicates:   Allergen Reactions    Pcn [penicillins] Hives       Past Medical History:   Diagnosis Date    Arthritis     Herpes simplex without mention of complication     Hypertension     Hypothyroidism        Past Surgical History:   Procedure Laterality Date    BREAST SURGERY Bilateral     breast biopsy    CHOLECYSTECTOMY N/A     HYSTERECTOMY  2000    fibroids    SINUS SURGERY         Family History   Problem Relation Age of Onset    Heart attack Father     Heart disease Father     Hypertension Brother     Lymphoma Sister     Hypertension Sister     Cancer Sister     Hypothyroidism Mother     Breast cancer Neg Hx     Colon cancer Neg Hx     Diabetes Neg Hx     Eclampsia Neg Hx     Miscarriages / Stillbirths Neg Hx     Ovarian cancer Neg Hx      labor Neg Hx     Stroke Neg Hx        Social History     Social History    Marital status: Single     Spouse name: N/A    Number of children: N/A    Years of education: N/A     Occupational History    [employer]     Social History Main Topics    Smoking status: Former Smoker     Packs/day: 1.00     Years: 30.00     Quit date: 1984    Smokeless tobacco: Not on file      Comment: quit 20 yrs ago    Alcohol use 1.2 oz/week     2 Glasses of wine per week      Comment: drink wine every day    Drug use: No    Sexual activity: Yes     Partners: Male     Birth control/ protection: Surgical, Other-see comments      Comment: Hysterectomy     Other Topics Concern    Not on file     Social History Narrative    Patient is a [employer] worker.       Vitals:    17 1001   BP: 131/84   Pulse: 71   Temp: 98.1 °F (36.7 °C)       Physical Exam   Constitutional: She is oriented to person, place, and time. She appears well-developed and well-nourished.   HENT:   Head:  Normocephalic and atraumatic.   Right Ear: External ear normal.   Left Ear: External ear normal.   Mouth/Throat: Oropharynx is clear and moist.   Eyes: Conjunctivae and EOM are normal. Pupils are equal, round, and reactive to light.   Neck: Normal range of motion. No JVD present. No tracheal deviation present. No thyromegaly present.   Cardiovascular: Normal rate, regular rhythm, normal heart sounds and intact distal pulses.  Exam reveals no gallop and no friction rub.    No murmur heard.  Pulmonary/Chest: Effort normal and breath sounds normal. No stridor. No respiratory distress. She has no wheezes. She exhibits no tenderness.   Abdominal: Soft. Bowel sounds are normal.   Musculoskeletal: Normal range of motion.   Lymphadenopathy:     She has no cervical adenopathy.   Neurological: She is alert and oriented to person, place, and time. She has normal reflexes.   Skin: Skin is warm and dry.   Psychiatric: She has a normal mood and affect.   Vitals reviewed.              Assessment & Plan:        56yo former smoker with persistent cough, a progressive partially calcified RLL basilar segmental lung mass, and a negative navigational bronchoscopy.  The lesion size, location, growth and remote smoking history all raise concerns about the possibility of a malignant neoplasm.  The calcification and growth are also suggestive of a possible inflammatory process.  Removal will require lower lobectomy based upon the size and location.  I recommend a RVATS, RLLobectomy with MLND.  There is the potential need for conversion to right minithoracotomy based upon intraoperative findings.  I offered the patient a surgery date on 5/15/2017.  She has several family events (graduations) and would like to defer the date until June 5th, 2017.  I have discussed the technical aspects, risks and benefits of the procedure with the patient.  I did inform the patient that the risks are the most common risks and that there are other less likely  risks that are too numerous to elaborate.  The patient is aware and has agreed to undergo the procedure as detailed on the consent form.

## 2017-05-03 LAB
POST FEF 25 75: 1.97 L/S (ref 1.78–2.98)
POST FET 100: 9.14 S
POST FEV1 FVC: 78 %
POST FEV1: 2.19 L (ref 2.23–2.8)
POST FIF 50: 3.54 L/S
POST FVC: 2.8 L (ref 2.9–3.57)
POST PEF: 6.61 L/S (ref 5.42–7.07)
PRE DLCO: 14.38 ML/MMHG/MIN (ref 16.85–25.14)
PRE ERV: 0.27 L
PRE FEF 25 75: 1.59 L/S (ref 1.78–2.98)
PRE FET 100: 9.94 S
PRE FEV1 FVC: 74 %
PRE FEV1: 2.14 L (ref 2.23–2.8)
PRE FIF 50: 2.95 L/S
PRE FRC PL: 2.07 L (ref 2.06–3.01)
PRE FVC: 2.88 L (ref 2.9–3.57)
PRE KROGHS K: 3.47 1/MIN
PRE PEF: 6.44 L/S (ref 5.42–7.07)
PRE RV: 1.8 L (ref 1.39–2.1)
PRE SVC: 2.88 L
PRE TLC: 4.68 L (ref 4.33–5.09)
PREDICTED DLCO: 20.99 ML/MMHG/MIN (ref 16.85–25.14)
PREDICTED FEV1 FVC: 78.48 % (ref 73.59–83.38)
PREDICTED FEV1: 2.51 L (ref 2.23–2.8)
PREDICTED FRC N2: 2.53 L (ref 2.06–3.01)
PREDICTED FRC PL: 2.53 L (ref 2.06–3.01)
PREDICTED FVC: 3.24 L (ref 2.9–3.57)
PREDICTED RV: 1.74 L (ref 1.39–2.1)
PREDICTED SVC: 2.93 L
PREDICTED TLC: 4.71 L (ref 4.33–5.09)
PROVOCATION PROTOCOL: ABNORMAL

## 2017-05-07 NOTE — PRE ADMISSION SCREENING
Anesthesia Assessment: Preoperative EQUATION    Planned Procedure: Procedure(s) (LRB):  THORACOSCOPY-VIDEO ASSISTED (VATS) W/ LOBECTOMY (Right)  DISSECTION-LYMPH NODE (Right)  Requested Anesthesia Type:General  Surgeon: Lawson Angelo MD  Service: Thoracic  Known or anticipated Date of Surgery:6/5/2017    Surgeon notes: reviewed and Right lower lobe lung mass    Electronic QUestionnaire Assessment completed via nurse interview with patient.        Alondra Bryan [[id number]] - 57 y.o. Female        Providers Outside of [facility]           Pre-admit from 6/5/2017 in [facility]     Has outside PCP  Yes [Dr. Donald Kelly-2/2017-[place], [place]-focused]       Surgical Risk Level      Surgical Risk Level:  3           caRDScore (Clinical Anesthesia Rapid Decision Score)        Low  Total Score: 11      11 Sum of Clinical Scores       caRDScores (Grouped)      caRDScore - Ane:  1                caRDScore - CVD:  1                caRDScore - Pul:  4                caRDScore - Met:  5                caRDScore - Phy:  0           caRDScore Items           Pre-admit from 6/5/2017 in [facility]     Anesthesia      Has GERD, hiatal hernia, or chronic heartburn/dyspepsia requiring Rx some or all times  Yes     CVD      Activity similar to best ability for maximal activity or exercise  METS 4     Diagnosed with high blood pressure  Yes     Typical BP runs <150/90  Yes     Pulmonary      Total smoking adds up to 20 - 40 years  Yes     During most of those years, smoked 1 pack per day  Yes     Has history of Asthma  Yes     Had asthma as a child only  Yes     Metabolic      Currently drinks on most days  Yes [x2 beers every other day]     Has liver / biliary / pancreas problem NOT followed by GI / Hepatologist  Yes [s/p Lap Mavis for gallbladder disease]     Thyroid disease (Specify)  Yes [hypothyroidism]     Physiologic        Flags      Red Flag Score:  0                Yellow Flag Score:  5            Red Flags      No data to display       Yellow Flags           Pre-admit from 6/5/2017 in [facility]     Takes herbal medications or vitamin supplements  Yes [will stop x7 days prior to sx]     Is or has been a Smoker  Yes     NSAID  Yes [Mobic & Ibuprofen prn-will stop x7 days prior to sx]     Has history of Asthma  Yes     Has pain  Yes       PONV Risk Score (assumes periop narcotic use = +1, Max=4)      PONV Risk Score:  3           PONV Risk Factors  Total Score: 2      1 Female     1 Non-Smoker at present       Sleep Apnea  Total Score: 0        BHARTI STOP-Bang Risk Factors (Max=8)  Total Score: 4      1 Has loud snoring     1 Observed to have interrupted breathing during sleep     1 Takes medication for high blood pressure     1 Age >50       BHARTI Risk Level - 1 (Low), 2 (Moderate), 3 (High)      BHARTI Risk Level:  2           RCRI (Revised Cardiac Risk Indices of ACC/AHA guidelines, Max=6)  Total Score: 1      1 Patient is having an intra-abdominal thoracic or major vascular case       CAD Risk Factors  Total Score: 5      1 Female. Age >55     1 Exercise on a routine basis     1 Early CAD in 1 or more close blood relative     1 Total smoking adds up to 20 - 40 years     1 Diagnosed with high blood pressure       CHADS Score if applicable (history of atrial fib/flutter, Max=6)  Total Score: 1      1 Diagnosed with high blood pressure       Maximal Exercise Capacity           Pre-admit from 6/5/2017 in [facility]     Maximal Exercise Capacity  METS 4       Summary of Dependence  Total Score: 1      1 Is totally independent of others for activities of daily living       Phone Fraility Score (Max = 17)  Total Score: 1      1 Uses 5 or more meds on reg basis       Pain Factors           Pre-admit from 6/5/2017 in [facility]     Has pain  Yes     Location and description of pain  -- [pulling sensation to right side rib cage area on backside]     Typical Pain  Scores  0 to 4 [level 2 at present]     Uses one of the following medications:  -- [Mobic]     Takes multiple opioid medications  Yes       Risk Triggers (Evidence-Based Risk Triggers)        Pulmonary Risk Triggers  Total Score: 2      1 Total smoking adds up to 20 - 40 years     1 Currently drinks on most days       Renal Risk Triggers  Total Score: 1      1 Diagnosed with high blood pressure       Delirium Risk Triggers  Total Score: 0        Urologic Risk Triggers  Total Score: 0        Logistics        Pre-op Clinic Logistics  Total Score: 7      1 Has outside PCP     2 Takes herbal medications or vitamin supplements     1 Has had anesthesia, either as adult or as a child     2 NSAID     1 Takes medication for high blood pressure       DOSC Logistics  Total Score: 1      1 NSAID       Discharge Logistics  Total Score: 0        Discharge Planning           Pre-admit from 6/5/2017 in [facility]     Discharge Planning      Will assist patient 24/7, if needed  -- [[phone]]       Anes & Int Med <For office use only>      Total Score: 13        Surgical Risk Level Assessment                 Triage considerations:     The patient has no apparent active cardiac condition (No unstable coronary Syndrome such as severe unstable angina or recent [<1 month] myocardial infarction, decompensated CHF, severe valvular   disease or significant arrhythmia)    Previous anesthesia records:4/11/17-Endobronchial Navigation/Bronchoscopy/Endobronchial Ultra Sound(EBUS)-General-Airway Device: LMA; Mask Ventilation: Easy; Intubated: Postinduction; Airway Device Size: 4.0; Style: Cuffed; Cuff Inflation: Minimal occlusive pressure; Placement Verified By: Auscultation, Capnometry; Complicating Factors:None-No PONV None-Airway/Jaw/Neck:  Airway Findings: Mouth Opening: Normal Tongue: Normal General Airway Assessment: Adult, Good TM Distance: Normal, at least 6 cm        Anesthesia Hx:  Denies Personal Hx  of Anesthesia complications.     Last PCP note: 3-6 months ago , within [facility] , focused visit   Subspecialty notes: Cardiology: General, Pulmonary, Orthopedics/Podiatry    Other important co-morbidities:  Patient Active Problem List   Diagnosis    Tendonitis of wrist, left    Hypertension    Family history of early CAD    Metatarsalgia    Hallux abducto valgus    Neuroma    Recurrent dislocation of foot    Lung mass           Tests already available:  Results have been reviewed.Labs-3/6/17-Creatinine/CBC/BUN/PT-INR/APTT/ CXR single view-4/11/17/ Stress Echo with EKG pre-test-2/16/15            Instructions given. (See in Nurse's note)    Optimization:  Anesthesia Preop Clinic Assessment  Indicated    Medical Opinion Indicated           Plan:    Testing:  T&S(surgeons' office has booked lab(CMP & CBC) on 5/17/17-will link T&S to lab appt.-linked on 5/9/17   Pre-anesthesia  visit       Visit focus: airway concerns     Consultation:IM Perioperative Hospitalist     Patient  has previously scheduled Medical Appointment:Appointments on 5/17/17 prior to surgery.    Navigation: Tests Scheduled.Labs-Linked T&S to 5/17/17,  lab appt. already booked by surgeons' office @ 11:30a.             Consults scheduled.POC & Perioperative IM Consult on 5/17/17 @ 9a & 10a(booked by surgeons' office)             Results will be tracked by Preop Clinic.                Sona Dumont RN  5/9/17

## 2017-05-09 ENCOUNTER — ANESTHESIA EVENT (OUTPATIENT)
Dept: SURGERY | Facility: HOSPITAL | Age: 57
DRG: 165 | End: 2017-05-09
Payer: COMMERCIAL

## 2017-05-09 DIAGNOSIS — Z01.818 PREOP TESTING: Primary | ICD-10-CM

## 2017-05-09 NOTE — ANESTHESIA PREPROCEDURE EVALUATION
Anesthesia Assessment: Preoperative EQUATION    Planned Procedure: Procedure(s) (LRB):  THORACOSCOPY-VIDEO ASSISTED (VATS) W/ LOBECTOMY (Right)  DISSECTION-LYMPH NODE (Right)  Requested Anesthesia Type:General  Surgeon: Lawson Angelo MD  Service: Thoracic  Known or anticipated Date of Surgery:6/5/2017    Surgeon notes: reviewed and Right lower lobe lung mass    Electronic QUestionnaire Assessment completed via nurse interview with patient.        Alondra Bryan [[id number]] - 57 y.o. Female        Providers Outside of [facility]           Pre-admit from 6/5/2017 in [facility]     Has outside PCP  Yes [Dr. Donald Kelly-2/2017-[place]]       Surgical Risk Level      Surgical Risk Level:  3           caRDScore (Clinical Anesthesia Rapid Decision Score)        Low  Total Score: 11      11 Sum of Clinical Scores       caRDScores (Grouped)      caRDScore - Ane:  1                caRDScore - CVD:  1                caRDScore - Pul:  4                caRDScore - Met:  5                caRDScore - Phy:  0           caRDScore Items           Pre-admit from 6/5/2017 in [facility]     Anesthesia      Has GERD, hiatal hernia, or chronic heartburn/dyspepsia requiring Rx some or all times  Yes     CVD      Activity similar to best ability for maximal activity or exercise  METS 4     Diagnosed with high blood pressure  Yes     Typical BP runs <150/90  Yes     Pulmonary      Total smoking adds up to 20 - 40 years  Yes     During most of those years, smoked 1 pack per day  Yes     Has history of Asthma  Yes     Had asthma as a child only  Yes     Metabolic      Currently drinks on most days  Yes [x2 beers every other day]     Has liver / biliary / pancreas problem NOT followed by GI / Hepatologist  Yes [s/p Lap Mavis for gallbladder disease]     Thyroid disease (Specify)  Yes [hypothyroidism]     Physiologic        Flags      Red Flag Score:  0                Yellow Flag Score:  5            Red Flags      No data to display       Yellow Flags           Pre-admit from 6/5/2017 in [facility]     Takes herbal medications or vitamin supplements  Yes [will stop x7 days prior to sx]     Is or has been a Smoker  Yes     NSAID  Yes [Mobic & Ibuprofen prn-will stop x7 days prior to sx]     Has history of Asthma  Yes     Has pain  Yes       PONV Risk Score (assumes periop narcotic use = +1, Max=4)      PONV Risk Score:  3           PONV Risk Factors  Total Score: 2      1 Female     1 Non-Smoker at present       Sleep Apnea  Total Score: 0        BHARTI STOP-Bang Risk Factors (Max=8)  Total Score: 4      1 Has loud snoring     1 Observed to have interrupted breathing during sleep     1 Takes medication for high blood pressure     1 Age >50       BHARTI Risk Level - 1 (Low), 2 (Moderate), 3 (High)      BHARTI Risk Level:  2           RCRI (Revised Cardiac Risk Indices of ACC/AHA guidelines, Max=6)  Total Score: 1      1 Patient is having an intra-abdominal thoracic or major vascular case       CAD Risk Factors  Total Score: 5      1 Female. Age >55     1 Exercise on a routine basis     1 Early CAD in 1 or more close blood relative     1 Total smoking adds up to 20 - 40 years     1 Diagnosed with high blood pressure       CHADS Score if applicable (history of atrial fib/flutter, Max=6)  Total Score: 1      1 Diagnosed with high blood pressure       Maximal Exercise Capacity           Pre-admit from 6/5/2017 in [facility]     Maximal Exercise Capacity  METS 4       Summary of Dependence  Total Score: 1      1 Is totally independent of others for activities of daily living       Phone Fraility Score (Max = 17)  Total Score: 1      1 Uses 5 or more meds on reg basis       Pain Factors           Pre-admit from 6/5/2017 in [facility]     Has pain  Yes     Location and description of pain  -- [pulling sensation to right side rib cage area on backside]     Typical  Pain Scores  0 to 4 [level 2 at present]     Uses one of the following medications:  -- [Mobic]     Takes multiple opioid medications  Yes       Risk Triggers (Evidence-Based Risk Triggers)        Pulmonary Risk Triggers  Total Score: 2      1 Total smoking adds up to 20 - 40 years     1 Currently drinks on most days       Renal Risk Triggers  Total Score: 1      1 Diagnosed with high blood pressure       Delirium Risk Triggers  Total Score: 0        Urologic Risk Triggers  Total Score: 0        Logistics        Pre-op Clinic Logistics  Total Score: 7      1 Has outside PCP     2 Takes herbal medications or vitamin supplements     1 Has had anesthesia, either as adult or as a child     2 NSAID     1 Takes medication for high blood pressure       DOSC Logistics  Total Score: 1      1 NSAID       Discharge Logistics  Total Score: 0        Discharge Planning           Pre-admit from 6/5/2017 in [facility]     Discharge Planning      Will assist patient 24/7, if needed  -- [[phone]]       Anes & Int Med <For office use only>      Total Score: 13        Surgical Risk Level Assessment                 Triage considerations:     The patient has no apparent active cardiac condition (No unstable coronary Syndrome such as severe unstable angina or recent [<1 month] myocardial infarction, decompensated CHF, severe valvular   disease or significant arrhythmia)    Previous anesthesia records:4/11/17-Endobronchial Navigation/Bronchoscopy/Endobronchial Ultra Sound(EBUS)-General-Airway Device: LMA; Mask Ventilation: Easy; Intubated: Postinduction; Airway Device Size: 4.0; Style: Cuffed; Cuff Inflation: Minimal occlusive pressure; Placement Verified By: Auscultation, Capnometry; Complicating Factors:None-No PONV None-Airway/Jaw/Neck:  Airway Findings: Mouth Opening: Normal Tongue: Normal General Airway Assessment: Adult, Good TM Distance: Normal, at least 6 cm        Anesthesia Hx:  Denies  Personal Hx of Anesthesia complications.     Last PCP note: 3-6 months ago , within [facility] , focused visit   Subspecialty notes: Cardiology: General, Pulmonary, Orthopedics/Podiatry    Other important co-morbidities:  Patient Active Problem List   Diagnosis    Tendonitis of wrist, left    Hypertension    Family history of early CAD    Metatarsalgia    Hallux abducto valgus    Neuroma    Recurrent dislocation of foot    Lung mass           Tests already available:  Results have been reviewed.Labs-3/6/17-Creatinine/CBC/BUN/PT-INR/APTT/ CXR single view-4/11/17/ Stress Echo with EKG pre-test-2/16/15            Instructions given. (See in Nurse's note)    Optimization:  Anesthesia Preop Clinic Assessment  Indicated    Medical Opinion Indicated           Plan:    Testing:  T&S(surgeons' office has booked lab(CMP & CBC) on 5/17/17-will link T&S to lab appt.-linked on 5/9/17   Pre-anesthesia  visit       Visit focus: airway concerns     Consultation:IM Perioperative Hospitalist     Patient  has previously scheduled Medical Appointment:Appointments on 5/17/17 prior to surgery.    Navigation: Tests Scheduled.Labs-Linked T&S to 5/17/17,  lab appt. already booked by surgeons' office @ 11:30a.             Consults scheduled.POC & Perioperative IM Consult on 5/17/17 @ 9a & 10a(booked by surgeons' office)             Results will be tracked by Preop Clinic.                Sona Dumont RN  5/9/17 05/09/2017  Pre-operative evaluation for Procedure(s) (LRB):  THORACOSCOPY-VIDEO ASSISTED (VATS) W/ LOBECTOMY (Right)  DISSECTION-LYMPH NODE (Right)    Alondra Bryan is a 57 y.o. female with PMH significant for HTN, GERD, and hypothyroidism who was recently discovered to have RLL lung mass after investigation into chronic cough. 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 PET CT negative for metastatic disease. Now  scheduled for above procedure.    Prev airway:   Placement Date: 04/11/17; Placement Time: 0907; Inserted by: Anesthesia Resident; Airway Device: LMA; Mask Ventilation: Easy; Intubated: Postinduction; Airway Device Size: 4.0; Style: Cuffed; Cuff Inflation: Minimal occlusive pressure; Placement Verified By: Auscultation, Capnometry; Complicating Factors: None; Intubation Findings: Positive EtCO2, Bilateral breath sounds, Atraumatic/Condition of teeth unchanged; Securment: Lips; Complications: None    Patient Active Problem List   Diagnosis    Tendonitis of wrist, left    Hypertension    Family history of early CAD    Metatarsalgia    Hallux abducto valgus    Neuroma    Recurrent dislocation of foot    Lung mass    Right lower lobe lung mass       Review of patient's allergies indicates:   Allergen Reactions    Pcn [penicillins] Hives        Current Outpatient Prescriptions on File Prior to Encounter   Medication Sig Dispense Refill    ARMOUR THYROID 15 mg Tab once daily.       ARMOUR THYROID 60 mg Tab once daily.   3    COMPOUND HORMONE REPLACEMENT Apply topically once daily. biest (50/50) prog/test/dhea 2/5/150/1.5/5mg in each arm       cyanocobalamin 1,000 mcg/mL injection 1,000 mcg every 28 days.      doxycycline (ORACEA) 40 mg capsule Take 40 mg by mouth every other day.       lisinopril 10 MG tablet TAKE 1 TABLET BY MOUTH ONCE A DAY 30 tablet 12    meloxicam (MOBIC) 15 MG tablet Take 15 mg by mouth as needed.   12    meloxicam (MOBIC) 15 MG tablet TAKE 1 TABLET BY MOUTH ONCE A DAY 30 tablet 12    METHYL-B12/L-MEFOLATE/B6 PHOS (LEVMEFOLATE-B6 PHOS-METHYL-B12 ORAL) Take 1 tablet by mouth once daily.      multivitamin with folic acid (ONE DAILY ESSENTIAL) 400 mcg Tab Take 1 tablet by mouth once daily.      pantoprazole (PROTONIX) 40 MG tablet Take 1 tablet (40 mg total) by mouth once daily. 30 tablet 0    SOOLANTRA 1 % Crea every other day.       VITAMIN D2 50,000 unit capsule 50,000 Units  every 7 days.   0     No current facility-administered medications on file prior to encounter.        Past Surgical History:   Procedure Laterality Date    BREAST SURGERY Bilateral     breast biopsy    CHOLECYSTECTOMY N/A     HYSTERECTOMY  2000    fibroids    SINUS SURGERY         Social History     Social History    Marital status: Single     Spouse name: N/A    Number of children: N/A    Years of education: N/A     Occupational History    Swrve     Social History Main Topics    Smoking status: Former Smoker     Packs/day: 1.00     Years: 20.00     Quit date: 1/1/1984    Smokeless tobacco: Not on file      Comment: quit 20 yrs ago    Alcohol use 1.2 oz/week     2 Cans of beer per week      Comment: drinks beer every 2 daysday    Drug use: No    Sexual activity: Yes     Partners: Male     Birth control/ protection: Surgical, Other-see comments      Comment: Hysterectomy     Other Topics Concern    Not on file     Social History Narrative    Patient is a [employer] worker.         Vital Signs Range (Last 24H):         CBC: No results for input(s): WBC, RBC, HGB, HCT, PLT, MCV, MCH, MCHC in the last 72 hours.    CMP: No results for input(s): NA, K, CL, CO2, BUN, CREATININE, GLU, MG, PHOS, CALCIUM, ALBUMIN, PROT, ALKPHOS, ALT, AST, BILITOT in the last 72 hours.    INR  No results for input(s): INR, PROTIME, APTT in the last 72 hours.    Invalid input(s): PT    Diagnostic Studies:  CXR 4/11/17: No pneumothorax or other complication of recent bronchoscopy identified.  The patient's known mass in the superior segment of the RIGHT lower lobe extending into the posterior segment of the RIGHT upper lobe is obscured by overlying mediastinal structures.    PET CT 3/22/17: Spiculated markedly hypermetabolic mass in the superior segment of the right lower lobe which is highly suspicious for malignancy.  There is no evidence of metastatic disease.    PFTs under results tab    EKG: None on  file    Exercise Stress Echo 2/6/15:  CONCLUSIONS     1 - Concentric remodeling.     2 - Normal left ventricular systolic function (EF 60-65%).     3 - Normal left ventricular diastolic function.     4 - Normal right ventricular systolic function .     No evidence of stress induced myocardial ischemia.  Anesthesia Evaluation    I have reviewed the Patient Summary Reports.    I have reviewed the Nursing Notes.   I have reviewed the Medications.     Review of Systems  Anesthesia Hx:  History of prior surgery of interest to airway management or planning: Denies Family Hx of Anesthesia complications.   Denies Personal Hx of Anesthesia complications.   Social:  Former Smoker, Social Alcohol Use    Cardiovascular:   Exercise tolerance: good Hypertension, well controlled Denies MI.   Denies Dysrhythmias.   Denies Angina.        Pulmonary:   Asthma asymptomatic Denies Recent URI.  Denies Sleep Apnea. RLL mass   Renal/:  Renal/ Normal     Hepatic/GI:   GERD, well controlled Denies Liver Disease.    Musculoskeletal:  Musculoskeletal Normal    Neurological:  Neurology Normal    Endocrine:   Denies Diabetes. Hypothyroidism        Physical Exam  General:  Well nourished    Airway/Jaw/Neck:  Airway Findings: Mouth Opening: Normal Mallampati: II  TM Distance: Normal, at least 6 cm  Jaw/Neck Findings:  Neck ROM: Normal ROM      Dental:  Dental Findings: In tact   Chest/Lungs:  Chest/Lungs Findings: Clear to auscultation, Normal Respiratory Rate     Heart/Vascular:  Heart Findings: Rate: Normal  Rhythm: Regular Rhythm        Mental Status:  Mental Status Findings:  Cooperative, Alert and Oriented         Anesthesia Plan  Type of Anesthesia, risks & benefits discussed:  Anesthesia Type:  general  Patient's Preference: General  Intra-op Monitoring Plan: standard ASA monitors and arterial line  Intra-op Monitoring Plan Comments:   Post Op Pain Control Plan:   Post Op Pain Control Plan Comments: Per primary service.     Induction:    IV  Beta Blocker:  Patient is not currently on a Beta-Blocker (No further documentation required).       Informed Consent: Patient understands risks and agrees with Anesthesia plan.  Questions answered. Anesthesia consent signed with patient.  ASA Score: 2     Day of Surgery Review of History & Physical:    H&P update referred to the surgeon.     Anesthesia Plan Notes: Risks/benefits of GETA discussed with patient. All questions/concerns addressed.         Ready For Surgery From Anesthesia Perspective.

## 2017-05-17 ENCOUNTER — HOSPITAL ENCOUNTER (OUTPATIENT)
Dept: PREADMISSION TESTING | Facility: HOSPITAL | Age: 57
Discharge: HOME | End: 2017-05-17
Attending: ANESTHESIOLOGY
Payer: COMMERCIAL

## 2017-05-17 ENCOUNTER — INITIAL CONSULT (OUTPATIENT)
Dept: INTERNAL MEDICINE | Facility: CLINIC | Age: 57
End: 2017-05-17
Payer: COMMERCIAL

## 2017-05-17 ENCOUNTER — HOSPITAL ENCOUNTER (OUTPATIENT)
Dept: CARDIOLOGY | Facility: CLINIC | Age: 57
Discharge: HOME | End: 2017-05-17
Payer: COMMERCIAL

## 2017-05-17 ENCOUNTER — HOSPITAL ENCOUNTER (OUTPATIENT)
Dept: RADIOLOGY | Facility: HOSPITAL | Age: 57
Discharge: HOME | End: 2017-05-17
Attending: OBSTETRICS & GYNECOLOGY
Payer: COMMERCIAL

## 2017-05-17 VITALS
SYSTOLIC BLOOD PRESSURE: 114 MMHG | SYSTOLIC BLOOD PRESSURE: 114 MMHG | WEIGHT: 161.19 LBS | DIASTOLIC BLOOD PRESSURE: 68 MMHG | OXYGEN SATURATION: 99 % | TEMPERATURE: 98 F | WEIGHT: 161.19 LBS | TEMPERATURE: 98 F | HEIGHT: 63 IN | DIASTOLIC BLOOD PRESSURE: 68 MMHG | HEIGHT: 63 IN | OXYGEN SATURATION: 99 % | HEART RATE: 76 BPM | BODY MASS INDEX: 28.56 KG/M2 | RESPIRATION RATE: 18 BRPM | HEART RATE: 78 BPM | BODY MASS INDEX: 28.56 KG/M2

## 2017-05-17 DIAGNOSIS — R07.89 ATYPICAL CHEST PAIN: ICD-10-CM

## 2017-05-17 DIAGNOSIS — K21.9 GASTROESOPHAGEAL REFLUX DISEASE, ESOPHAGITIS PRESENCE NOT SPECIFIED: ICD-10-CM

## 2017-05-17 DIAGNOSIS — Z98.890 PONV (POSTOPERATIVE NAUSEA AND VOMITING): ICD-10-CM

## 2017-05-17 DIAGNOSIS — Z79.890 HORMONE REPLACEMENT THERAPY (HRT): ICD-10-CM

## 2017-05-17 DIAGNOSIS — R92.8 ABNORMAL MAMMOGRAM: ICD-10-CM

## 2017-05-17 DIAGNOSIS — J45.20 MILD INTERMITTENT ASTHMA WITHOUT COMPLICATION: ICD-10-CM

## 2017-05-17 DIAGNOSIS — L29.9 ITCHING: ICD-10-CM

## 2017-05-17 DIAGNOSIS — R11.2 PONV (POSTOPERATIVE NAUSEA AND VOMITING): ICD-10-CM

## 2017-05-17 DIAGNOSIS — I10 ESSENTIAL HYPERTENSION: Primary | ICD-10-CM

## 2017-05-17 DIAGNOSIS — Z82.49 FAMILY HISTORY OF EARLY CAD: ICD-10-CM

## 2017-05-17 DIAGNOSIS — R05.9 COUGH: ICD-10-CM

## 2017-05-17 DIAGNOSIS — Z01.818 PREOP EXAMINATION: ICD-10-CM

## 2017-05-17 DIAGNOSIS — E66.3 OVER WEIGHT: ICD-10-CM

## 2017-05-17 PROCEDURE — 99999 PR PBB SHADOW E&M-EST. PATIENT-LVL III: CPT | Mod: PBBFAC,,, | Performed by: HOSPITALIST

## 2017-05-17 PROCEDURE — 77066 DX MAMMO INCL CAD BI: CPT | Mod: 26,,, | Performed by: RADIOLOGY

## 2017-05-17 PROCEDURE — 93000 ELECTROCARDIOGRAM COMPLETE: CPT | Mod: S$GLB,,, | Performed by: INTERNAL MEDICINE

## 2017-05-17 PROCEDURE — 77062 BREAST TOMOSYNTHESIS BI: CPT | Mod: 26,,, | Performed by: RADIOLOGY

## 2017-05-17 PROCEDURE — 77062 BREAST TOMOSYNTHESIS BI: CPT | Mod: TC

## 2017-05-17 PROCEDURE — 99244 OFF/OP CNSLTJ NEW/EST MOD 40: CPT | Mod: S$GLB,,, | Performed by: HOSPITALIST

## 2017-05-17 RX ORDER — DIPHENHYDRAMINE HCL 25 MG
50 CAPSULE ORAL NIGHTLY
COMMUNITY
End: 2018-01-05

## 2017-05-17 NOTE — LETTER
May 17, 2017      Lawson Angelo MD  [address] [facility] [address]           [facility]  [address] [facility] [address]  Phone: [phone]          Patient: Alondra Bryan   MR Number: [id number]   YOB: 1960   Date of Visit: 5/17/2017       Dear Dr. Lawson Angelo:    Thank you for referring Alondra Bryan to me for evaluation. Attached you will find relevant portions of my assessment and plan of care.    If you have questions, please do not hesitate to call me. I look forward to following Alondra Bryan along with you.    Sincerely,    Ольга Burgos MD    Enclosure  CC:  MD Louis March MD    If you would like to receive this communication electronically, please contact [email or web address] or [phone] to request more information on [employer] Link access.    For providers and/or their staff who would like to refer a patient to [facility], please contact us through our one-stop-shop provider referral line, [facility], at [phone].    If you feel you have received this communication in error or would no longer like to receive these types of communications, please e-mail [email or web address]

## 2017-05-17 NOTE — PATIENT INSTRUCTIONS
Your surgery has been scheduled for:___Monday June 5___     You should report to:  _____[facility], located on the Pump Back side of the first floor of the [facility] ([phone])  ___X___The Second Floor [facility] Center, located on the [facility] side of the Second floor of the [facility] ([phone])  ______3rd Floor [facility] located on the [facility] side of the [facility] [phone]    Please Note  -Tell your doctors if you take asprin, products containing aspirin, herbal medications or blood thinners, such as Coumadin, Ticlid, or Plavix. (Consult your provider regarding holding or stopping before surgery).  -Arrange for someone to drive you home following surgery. You will not be allowed to leave the surgical facility alone or drive yourself home following sedation and anesthesia.      Outpatient Encounter Prescriptions as of 5/17/2017   Medication Sig Note Dispense Refill    ARMOUR THYROID 15 mg Tab 15 mg every morning.  5/17/2017: Take AM of surgery      ARMOUR THYROID 60 mg Tab 60 mg every morning.  5/17/2017: Take AM of surgery  3    COMPOUND HORMONE REPLACEMENT Apply topically every evening. biest (50/50) prog/test/dhea 2/5/150/1.5/5mg in each arm  5/17/2017: Use as sched PM      cyanocobalamin 1,000 mcg/mL injection 1,000 mcg every 28 days. Takes 1st of every month 5/17/2017: Take as sched      diphenhydrAMINE (BENADRYL) 25 mg capsule Take 50 mg by mouth every evening. 5/17/2017: Take as sched PM      doxycycline (ORACEA) 40 mg capsule Take 40 mg by mouth every other day.  5/17/2017: Take as sched PM      GUAIFENESIN (TUSSIN COUGH ORAL) Take by mouth nightly as needed.  5/17/2017: Use as needed PM      lisinopril 10 MG tablet TAKE 1 TABLET BY MOUTH ONCE A DAY (Patient taking differently: TAKE 1 TABLET BY MOUTH ONCE A DAY AM) 5/17/2017: Hold and bring AM of surgery 30 tablet 12    meloxicam (MOBIC) 15 MG tablet TAKE 1 TABLET  BY MOUTH ONCE A DAY (Patient taking differently: TAKE 1 TABLET BY MOUTH ONCE A DAY prn) 5/9/2017: Hold times 7 days prior to surgery 30 tablet 12    METHYL-B12/L-MEFOLATE/B6 PHOS (LEVMEFOLATE-B6 PHOS-METHYL-B12 ORAL) Take 1 tablet by mouth every morning.  5/9/2017: Hold times 7 days prior to surgery      multivitamin with folic acid (ONE DAILY ESSENTIAL) 400 mcg Tab Take 1 tablet by mouth every morning.  5/9/2017: Hold times 7 days prior to surgery      pantoprazole (PROTONIX) 40 MG tablet Take 1 tablet (40 mg total) by mouth once daily. (Patient taking differently: Take 40 mg by mouth every evening. ) 5/17/2017: Use as sched PM 30 tablet 0    SOOLANTRA 1 % Crea every other day.  5/17/2017: Use as sched PM      VITAMIN D2 50,000 unit capsule 50,000 Units every 7 days. Takes on Sun 5/17/2017: Take as sched  0        12     No facility-administered encounter medications on file as of 5/17/2017.          Please review the instructions regarding your above listed medications.    Before Surgery  -Stop taking all herbal medications 14 days prior to surgery  -Stop taking asprin, products containing asprin 7 days before surgery  -Stop taking blood thinners   days before surgery  -Refrain from drinking alcoholic beverages for 24 hours before and after surgery  -Stop or limit smoking   days before surgery    Night before Surgery  -DO NOT EAT OR DRINK ANYTHING AFTER MIDNIGHT, INCLUDING GUM, HARD CANDY, MINTS, OR CHEWING TOBACCO  -Take a shower or bath (shower is recommended). Bathe with Hibiciens soap or an antibacterial soap from the neck down. If not supplied by your surgeon, Hibiciens soap will need to be purchased over the counter in the pharmacy. Rinse soap off thoroughly.  -Shampoo your hair with your regular shampoo    The Day of Surgery  -Take another bath or shower with Hibiciens or any antibacterial soap, to reduce the chance of infection.  -Take heart and blood pressure medications with a small sip of water,  as advised by the perioperative team.  -Do not take fluid pills  -You may brush your teeth and rinse your mouth, but do not swallow any additional water.  -Do not apply perfumes, powder, body lotions, or deodorant on the day of surgery.  -Nail polish should be removed  -Do not wear makeup or moisturizer  -Wear comfortable clothes, such as a button front shirt and loose fitting pants.  -Leave all jewelry, including body piercings, and valuables at home.  -Bring any devices you will need after surgery such as crutches or canes.  -If you have sleep apnea, please bring your CPAP machine    In the event of your physical conditions including the onset of a cold or respiratory illness, or if you have to delay or cancel your surgery, please notify your surgeon.     If you have any questions or concerns, please don't hesitate to call.    Sincerely,    Shirley [phone]    Tips to Control Acid Reflux    To control acid reflux, youll need to make some basic diet and lifestyle changes. The simple steps outlined below may be all youll need to ease discomfort.  Watch what you eat  · Avoid fatty foods and spicy foods.  · Eat fewer acidic foods, such as citrus and tomato-based foods. These can increase symptoms.  · Limit drinking alcohol, caffeine, and fizzy beverages. All increase acid reflux.  · Try limiting chocolate, peppermint, and spearmint. These can worsen acid reflux in some people.  Watch when you eat  · Avoid lying down for 3 hours after eating.  · Do not snack before going to bed.  Raise your head  Raising your head and upper body by 4 to 6 inches helps limit reflux when youre lying down. Put blocks under the head of your bed frame to raise it.  Other changes  · Lose weight, if you need to  · Dont exercise near bedtime  · Avoid tight-fitting clothes  · Limit aspirin and ibuprofen  · Stop smoking   Date Last Reviewed: 7/1/2016  © 3270-1223 [employer]. [address] [place] [address], [place] [address]. All  rights reserved. This information is not intended as a substitute for professional medical care. Always follow your healthcare professional's instructions.

## 2017-05-17 NOTE — IP AVS SNAPSHOT
[facility]  [address]  [facility] [address]  Phone: [phone]           Patient Discharge Instructions  Our goal is to set you up for success. This packet includes information on your condition, medications, and your home care. It will help you care for yourself to prevent having to return to the hospital.     Please ask your nurse if you have any questions.      There are many details to remember when preparing for your surgery. Here is what you will need to do, please ask your nurse if there are more specific instructions and if you have any questions:    1. Before procedure Do not smoke or drink alcoholic beverages 24 hours prior to your procedure. Do not eat or drink anything 8 hours before your procedure - this includes gum, mints, and candy.     2. Day of procedure Please remove all jewelry for the procedure. If you wear contact lenses, dentures, hearing aids or glasses, bring a container to put them in during your surgery and give to a family member.  If your doctor has scheduled you for an overnight stay, bring a small overnight bag with any personal items that you need.      3. After procedure  Make arrangements in advance for transportation home by a responsible adult. It is not safe to drive a vehicle during the 24 hours following surgery.     PLEASE NOTE: You may be contacted the day before your surgery to confirm your surgery date and arrival time. The Surgery schedule has many variables which may affect the time of your surgery case. Family members should be available if your surgery time changes.           [facility]  Unless otherwise directed by your provider, please   contact [facility], our nurse care line   that is available for 24/7 assistance.     [phone] (toll-free)     Registered nurses in the [facility]   provide: appointment scheduling, clinical advisement, health education, and other advisory services.                   ** Verify the list of medication(s) below is accurate and up to date. Carry this with you in case of emergency. If your medications have changed, please notify your healthcare provider.             Medication List      TAKE these medications        Additional Info                      * ARMOUR THYROID 15 mg Tab   Refills:  0   Dose:  15 mg   Generic drug:  thyroid (pork)    Instructions:  15 mg every morning.     Begin Date    AM    Noon    PM    Bedtime       * ARMOUR THYROID 60 mg Tab   Refills:  3   Dose:  60 mg   Generic drug:  thyroid (pork)    Instructions:  60 mg every morning.     Begin Date    AM    Noon    PM    Bedtime       COMPOUND HORMONE REPLACEMENT   Refills:  0    Instructions:  Apply topically every evening. biest (50/50) prog/test/dhea 2/5/150/1.5/5mg in each arm     Begin Date    AM    Noon    PM    Bedtime       cyanocobalamin 1,000 mcg/mL injection   Refills:  0   Dose:  1000 mcg    Instructions:  1,000 mcg every 28 days. Takes 1st of every month     Begin Date    AM    Noon    PM    Bedtime       diphenhydrAMINE 25 mg capsule   Commonly known as:  BENADRYL   Refills:  0   Dose:  50 mg    Instructions:  Take 50 mg by mouth every evening.     Begin Date    AM    Noon    PM    Bedtime       doxycycline 40 mg capsule   Commonly known as:  ORACEA   Refills:  0   Dose:  40 mg    Instructions:  Take 40 mg by mouth every other day.     Begin Date    AM    Noon    PM    Bedtime       LEVMEFOLATE-B6 PHOS-METHYL-B12 ORAL   Refills:  0   Dose:  1 tablet    Instructions:  Take 1 tablet by mouth every morning.     Begin Date    AM    Noon    PM    Bedtime       lisinopril 10 MG tablet   Quantity:  30 tablet   Refills:  12    Instructions:  TAKE 1 TABLET BY MOUTH ONCE A DAY     Begin Date    AM    Noon    PM    Bedtime       meloxicam 15 MG tablet   Commonly known as:  MOBIC   Quantity:  30 tablet   Refills:  12   What changed:  Another medication with the same name was removed. Continue taking this  medication, and follow the directions you see here.    Instructions:  TAKE 1 TABLET BY MOUTH ONCE A DAY     Begin Date    AM    Noon    PM    Bedtime       ONE DAILY ESSENTIAL 400 mcg Tab   Refills:  0   Dose:  1 tablet   Generic drug:  multivitamin with folic acid    Instructions:  Take 1 tablet by mouth every morning.     Begin Date    AM    Noon    PM    Bedtime       pantoprazole 40 MG tablet   Commonly known as:  PROTONIX   Quantity:  30 tablet   Refills:  0   Dose:  40 mg    Instructions:  Take 1 tablet (40 mg total) by mouth once daily.     Begin Date    AM    Noon    PM    Bedtime       SOOLANTRA 1 % Crea   Refills:  0   Generic drug:  ivermectin    Instructions:  every other day.     Begin Date    AM    Noon    PM    Bedtime       TUSSIN COUGH ORAL   Refills:  0    Instructions:  Take by mouth nightly as needed.     Begin Date    AM    Noon    PM    Bedtime       VITAMIN D2 50,000 unit Cap   Refills:  0   Dose:  03979 Units   Generic drug:  ergocalciferol    Instructions:  50,000 Units every 7 days. Takes on Sun     Begin Date    AM    Noon    PM    Bedtime       * Notice:  This list has 2 medication(s) that are the same as other medications prescribed for you. Read the directions carefully, and ask your doctor or other care provider to review them with you.               Please bring to all follow up appointments:    1. A copy of your discharge instructions.  2. All medicines you are currently taking in their original bottles.  3. Identification and insurance card.    Please arrive 15 minutes ahead of scheduled appointment time.    Please call 24 hours in advance if you must reschedule your appointment and/or time.        Your Scheduled Appointments     May 17, 2017 10:00 AM CDT   Consult with MD Tip Long - Pre Op Consult ([facility] )    [address] [address]   [phone]            May 17, 2017 11:30 AM CDT   Non-Fasting Lab with LAB,  [place]POINT[place] [facility] ([facility])    [address] [facility] [facility] [address] [phone]            May 17, 2017  1:00 PM CDT   Mammo Jonatan Diag with [facility] MAMMO3 DX   [facility] ([facility] )    [address] [facility] [facility] [address] [phone]              Your Future Surgeries/Procedures     Jun 05, 2017   Surgery with Lawson Angelo MD   [facility] ([facility])    [address] [address] [phone]                  Discharge Instructions       Your surgery has been scheduled for:__________________________________________    You should report to:  ____[facility], located on the [place] side of the first floor of the           [facility] ([phone])  ____[facility], located on the [facility] side of the            Second floor of the [facility] ([phone])  ____3rd Floor SSC located on the [facility] side of the [facility] [phone]  Please Note   - Tell your doctor if you take Aspirin, products containing Aspirin, herbal medications  or blood thinners, such as Coumadin, Ticlid, or Plavix.  (Consult your provider regarding holding or stopping before surgery).  - Arrange for someone to drive you home following surgery.  You will not be allowed to leave the surgical facility alone or drive yourself home following sedation and anesthesia.  Before Surgery  - Stop taking all herbal medications 14days prior to surgery  - No Motrin/Advil (Ibuprofen) 7 days before surgery  - No Aleve (Naproxen) 7 days before surgery  - Stop Taking Asprin, products containing Asprin _____days before surgery  - Stop taking blood thinners_______days before surgery  - Refrain from drinking alcoholic beverages for 24hours before and after surgery  - Stop or  limit smoking _________days before surgery  Night before Surgery  - DO NOT EAT OR DRINK ANYTHING AFTER MIDNIGHT, INCLUDING GUM, HARD CANDY, MINTS, OR CHEWING TOBACCO.  - Take a shower or bath (shower is recommended).  Bathe with Hibiclens soap or an antibacterial soap from the neck down.  If not supplied by your surgeon, hibiclens soap will need to be purchased over the counter in pharmacy.  Rinse soap off thoroughly.  - Shampoo your hair with your regular shampoo  The Day of Surgery  - Take another bath or shower with hibiclens or any antibacterial soap, to reduce the chance of infection.  - Take heart and blood pressure medications with a small sip of water, as advised by the perioperative team.  - Do not take fluid pills  - You may brush your teeth and rinse your mouth, but do not swall any additional water.   - Do not apply perfumes, powder, body lotions or deodorant on the day of surgery.  - Nail polish should be removed.  - Do not wear makeup or moisturizer  - Wear comfortable clothes, such as a button front shirt and loose fitting pants.  - Leave all jewelry, including body piercings, and valuables at home.    - Bring any devices you will neeed after surgery such as crutches or canes.  - If you have sleep apnea, please bring your CPAP machine  In the event that your physical condition changes including the onset of a cold or respiratory illness, or if you have to delay or cancel your surgery, please notify your surgeon.Anesthesia: General Anesthesia  Youre due to have surgery. During surgery, youll be given medication called anesthesia. (It is also called anesthetic.) This will keep you comfortable and pain-free. Your surgeon will use general anesthesia. This sheet tells you more about it.    What Is General Anesthesia?  General anesthesia puts you into a state like deep sleep. It goes into the bloodstream (IV anesthetics), into the lungs (gas anesthetics), or both. You feel nothing during the procedure.  You will not remember it. During the procedure, the anesthesia provider monitors you. He or she checks your heart rate and rhythm, blood pressure, and blood oxygen.  · IV Anesthetics  IV anesthetics are given through an IV line in your arm. Theyre often given first. This is so you are asleep before a gas anesthetic is started. Some kinds of IV anesthetics relieve pain. Others relax you. Your doctor will decide which kind is best in your case.  · Gas Anesthetics  Gas anesthetics are breathed into the lungs. They are often used to keep you asleep. They can be given through a facemask, an endotracheal tube, or a laryngeal mask airway.  ¨ If you have a facemask, your anesthesia provider will most likely place it over your nose and mouth while youre still awake. Youll breathe oxygen through the mask as your IV anesthetic is started. Gas anesthetic may be added through the mask.  ¨ If you have an endotracheal tube or a laryngeal mask airway, it will be inserted into your throat after youre asleep.  Anesthesia Tools and Medications  You will likely have:  · IV anesthetics sent through an IV line into your bloodstream.  · Gas anesthetics breathed into your lungs, where they pass into your bloodstream.  · A pulse oximeter on the end of your finger. This measures your blood oxygen level.  · Electrocardiography leads (electrodes) on your chest. These record your heart rate and rhythm.  · A blood pressure cuff. This reads your blood pressure.  Risks and Possible Complications  General anesthesia has some risks. These include:  · Breathing problems  · Nausea and vomiting  · Sore throat or hoarseness  · Allergic reaction to the anesthetic  · Ongoing numbness (rare)  · Irregular heartbeat (rare)  · Cardiac arrest (rare)   Anesthesia Safety  · Follow all instructions you are given for how long not to eat or drink before your procedure.  · Be sure your doctor knows what medications you take. This includes over-the-counter  "medications, herbs, and supplements.  · Have an adult family member or friend drive you home after the procedure.  · For the first 24 hours after your surgery:  ¨ Do not drive or use heavy equipment.  ¨ Do not make important decisions or sign documents.  ¨ Avoid alcohol.  ¨ Have someone stay with you, if possible. They can watch for problems and help keep you safe.  © 6602-5566 [facility], [address] [place] [address], [place] [address]. All rights reserved. This information is not intended as a substitute for professional medical care. Always follow your healthcare professional's instructions.      Admission Information     Date & Time Provider Department CSN    5/17/2017  9:00 AM Brenda Moraes MD [facility] [id number]      Care Providers     Provider Role Specialty Primary office phone    Brenda Moraes MD Attending Provider Anesthesiology [phone]      Your Vitals Were     BP Pulse Temp Resp Height Weight    114/68 78 98 °F (36.7 °C) 18 5' 3" (1.6 m) 73.1 kg (161 lb 2.5 oz)    SpO2 BMI             99% 28.55 kg/m2         Recent Lab Values     No lab values to display.      Allergies as of 5/17/2017        Reactions    Pcn [Penicillins] Hives      Advance Directives     An advance directive is a document which, in the event you are no longer able to make decisions for yourself, tells your healthcare team what kind of treatment you do or do not want to receive, or who you would like to make those decisions for you.  If you do not currently have an advance directive, [facility] encourages you to create one.  For more information call:  [phone]), [phone] ([phone]),  or log on to [email or web address].        Smoking Cessation     If you would like to quit smoking:   You may be eligible for free services if you are a [place] resident and started smoking cigarettes before September 1, 1988.  Call the Smoking Cessation Trust (SCT) toll free at (293) 004-2303 or " (957) 346-1328.   Call 1-800-QUIT-NOW if you do not meet the above criteria.   Contact us via email: [email or web address]   View our website for more information: [email or web address]        Language Assistance Services     ATTENTION: Language assistance services are available, free of charge. Please call [phone].      ATENCIÓN: Si habla español, tiene a shelley disposición servicios gratuitos de asistencia lingüística. Llame al [phone].     CHÚ Ý: N?u b?n nói Ti?ng Vi?t, có các d?ch v? h? tr? ngôn ng? mi?n phí dành cho b?n. G?i s? [phone].         [facility] complies with applicable Federal civil rights laws and does not discriminate on the basis of race, color, national origin, age, disability, or sex.

## 2017-05-17 NOTE — MR AVS SNAPSHOT
[facility] - Pre Op Consult  [address] [address]  Phone: [phone]                  Alondra Bryan   2017 10:00 AM   Initial consult    Description:  Female : 1960   Provider:  Ольга Burgos MD   Department:  [facility] - Pre Op Consult           Reason for Visit     Pre-op Exam                To Do List           Future Appointments        Provider Department Dept Phone    2017 10:00 AM Ольга Burgos MD [facility] - Pre Op Consult [phone]    2017 11:30 AM LAB, APPOINT[place] [facility] [phone]    2017 1:00 PM [facility] DX [facility] [phone]      Your Future Surgeries/Procedures     2017   Surgery with Lawson Angelo MD   [facility] ([facility])    [address] [facility] [facility] [id number] [phone]              Goals (5 Years of Data)     None      [facility]     [facility] Nurse Care Line -  Assistance  Unless otherwise directed by your provider, please contact [facility], our nurse care line that is available for  assistance.     Registered nurses in the [facility] provide: appointment scheduling, clinical advisement, health education, and other advisory services.  Call: [phone] (toll free)               Medications           Message regarding Medications     Verify the changes and/or additions to your medication regime listed below are the same as discussed with your clinician today.  If any of these changes or additions are incorrect, please notify your healthcare provider.             Verify that the below list of medications is an accurate representation of the medications you are currently taking.  If none reported, the list may be blank. If incorrect, please contact your healthcare provider. Carry this list with you in case of emergency.           Current  "Medications     ARMOUR THYROID 15 mg Tab 15 mg every morning.     ARMOUR THYROID 60 mg Tab 60 mg every morning.     COMPOUND HORMONE REPLACEMENT Apply topically every evening. biest (50/50) prog/test/dhea 2/5/150/1.5/5mg in each arm     cyanocobalamin 1,000 mcg/mL injection 1,000 mcg every 28 days. Takes 1st of every month    diphenhydrAMINE (BENADRYL) 25 mg capsule Take 50 mg by mouth every evening.    doxycycline (ORACEA) 40 mg capsule Take 40 mg by mouth every other day.     GUAIFENESIN (TUSSIN COUGH ORAL) Take by mouth nightly as needed.     lisinopril 10 MG tablet TAKE 1 TABLET BY MOUTH ONCE A DAY    meloxicam (MOBIC) 15 MG tablet TAKE 1 TABLET BY MOUTH ONCE A DAY    METHYL-B12/L-MEFOLATE/B6 PHOS (LEVMEFOLATE-B6 PHOS-METHYL-B12 ORAL) Take 1 tablet by mouth every morning.     multivitamin with folic acid (ONE DAILY ESSENTIAL) 400 mcg Tab Take 1 tablet by mouth every morning.     pantoprazole (PROTONIX) 40 MG tablet Take 1 tablet (40 mg total) by mouth once daily.    SOOLANTRA 1 % Crea every other day.     VITAMIN D2 50,000 unit capsule 50,000 Units every 7 days. Takes on Sun           Clinical Reference Information           Your Vitals Were     BP Pulse Temp Height Weight SpO2    114/68 76 98 °F (36.7 °C) 5' 3" (1.6 m) 73.1 kg (161 lb 2.5 oz) 99%    BMI                28.55 kg/m2          Blood Pressure          Most Recent Value    BP  114/68 [right and 117/65 left]      Allergies as of 5/17/2017     Pcn [Penicillins]      Immunizations Administered on Date of Encounter - 5/17/2017     None      Instructions      Your surgery has been scheduled for:___Monday June 5___     You should report to:  _____[facility], located on the [place] side of the first floor of the [facility] ([phone])  ___X___[facility], located on the [facility] side of the Second floor of the [facility] ([phone])  ______3rd Floor [facility] located on the [facility] side of the [facility] [phone]    Please Note  -Tell your doctors if you take asprin, products containing aspirin, herbal medications or blood thinners, such as Coumadin, Ticlid, or Plavix. (Consult your provider regarding holding or stopping before surgery).  -Arrange for someone to drive you home following surgery. You will not be allowed to leave the surgical facility alone or drive yourself home following sedation and anesthesia.      Outpatient Encounter Prescriptions as of 5/17/2017   Medication Sig Note Dispense Refill    ARMOUR THYROID 15 mg Tab 15 mg every morning.  5/17/2017: Take AM of surgery      ARMOUR THYROID 60 mg Tab 60 mg every morning.  5/17/2017: Take AM of surgery  3    COMPOUND HORMONE REPLACEMENT Apply topically every evening. biest (50/50) prog/test/dhea 2/5/150/1.5/5mg in each arm  5/17/2017: Use as sched PM      cyanocobalamin 1,000 mcg/mL injection 1,000 mcg every 28 days. Takes 1st of every month 5/17/2017: Take as sched      diphenhydrAMINE (BENADRYL) 25 mg capsule Take 50 mg by mouth every evening. 5/17/2017: Take as sched PM      doxycycline (ORACEA) 40 mg capsule Take 40 mg by mouth every other day.  5/17/2017: Take as sched PM      GUAIFENESIN (TUSSIN COUGH ORAL) Take by mouth nightly as needed.  5/17/2017: Use as needed PM      lisinopril 10 MG tablet TAKE 1 TABLET BY MOUTH ONCE A DAY (Patient taking differently: TAKE 1 TABLET BY MOUTH ONCE A DAY AM) 5/17/2017: Hold and bring AM of surgery 30 tablet 12    meloxicam (MOBIC) 15 MG tablet TAKE 1 TABLET BY MOUTH ONCE A DAY (Patient taking differently: TAKE 1 TABLET BY MOUTH ONCE A DAY prn) 5/9/2017: Hold times 7 days prior to surgery 30 tablet 12    METHYL-B12/L-MEFOLATE/B6 PHOS (LEVMEFOLATE-B6 PHOS-METHYL-B12 ORAL) Take 1 tablet by mouth every morning.  5/9/2017: Hold times 7 days prior to surgery      multivitamin with folic acid (ONE DAILY ESSENTIAL) 400 mcg Tab Take 1 tablet by mouth every  morning.  5/9/2017: Hold times 7 days prior to surgery      pantoprazole (PROTONIX) 40 MG tablet Take 1 tablet (40 mg total) by mouth once daily. (Patient taking differently: Take 40 mg by mouth every evening. ) 5/17/2017: Use as sched PM 30 tablet 0    SOOLANTRA 1 % Crea every other day.  5/17/2017: Use as sched PM      VITAMIN D2 50,000 unit capsule 50,000 Units every 7 days. Takes on Sun 5/17/2017: Take as sched  0        12     No facility-administered encounter medications on file as of 5/17/2017.          Please review the instructions regarding your above listed medications.    Before Surgery  -Stop taking all herbal medications 14 days prior to surgery  -Stop taking asprin, products containing asprin 7 days before surgery  -Stop taking blood thinners   days before surgery  -Refrain from drinking alcoholic beverages for 24 hours before and after surgery  -Stop or limit smoking   days before surgery    Night before Surgery  -DO NOT EAT OR DRINK ANYTHING AFTER MIDNIGHT, INCLUDING GUM, HARD CANDY, MINTS, OR CHEWING TOBACCO  -Take a shower or bath (shower is recommended). Bathe with Hibiciens soap or an antibacterial soap from the neck down. If not supplied by your surgeon, Hibiciens soap will need to be purchased over the counter in the pharmacy. Rinse soap off thoroughly.  -Shampoo your hair with your regular shampoo    The Day of Surgery  -Take another bath or shower with Hibiciens or any antibacterial soap, to reduce the chance of infection.  -Take heart and blood pressure medications with a small sip of water, as advised by the perioperative team.  -Do not take fluid pills  -You may brush your teeth and rinse your mouth, but do not swallow any additional water.  -Do not apply perfumes, powder, body lotions, or deodorant on the day of surgery.  -Nail polish should be removed  -Do not wear makeup or moisturizer  -Wear comfortable clothes, such as a button front shirt and loose fitting pants.  -Leave all  jewelry, including body piercings, and valuables at home.  -Bring any devices you will need after surgery such as crutches or canes.  -If you have sleep apnea, please bring your CPAP machine    In the event of your physical conditions including the onset of a cold or respiratory illness, or if you have to delay or cancel your surgery, please notify your surgeon.     If you have any questions or concerns, please don't hesitate to call.    Sincerely,    Shirley [phone]         Language Assistance Services     ATTENTION: Language assistance services are available, free of charge. Please call [phone].      ATENCIÓN: Si habla español, tiene a shelley disposición servicios gratuitos de asistencia lingüística. Llame al [phone].     CHÚ Ý: N?u b?n nói Ti?ng Vi?t, có các d?ch v? h? tr? ngôn ng? mi?n phí dành cho b?n. G?i s? [phone].         [facility] Pre [facility] Consult complies with applicable Federal civil rights laws and does not discriminate on the basis of race, color, national origin, age, disability, or sex.

## 2017-05-17 NOTE — DISCHARGE INSTRUCTIONS
Your surgery has been scheduled for:__________________________________________    You should report to:  ____[facility], located on the [place] side of the first floor of the           [facility] ([phone])  ____[facility], located on the [facility] side of the            Second floor of the [facility] ([phone])  ____3rd Floor [facility] located on the [facility] side of the [facility] [phone]  Please Note   - Tell your doctor if you take Aspirin, products containing Aspirin, herbal medications  or blood thinners, such as Coumadin, Ticlid, or Plavix.  (Consult your provider regarding holding or stopping before surgery).  - Arrange for someone to drive you home following surgery.  You will not be allowed to leave the surgical facility alone or drive yourself home following sedation and anesthesia.  Before Surgery  - Stop taking all herbal medications 14days prior to surgery  - No Motrin/Advil (Ibuprofen) 7 days before surgery  - No Aleve (Naproxen) 7 days before surgery  - Stop Taking Asprin, products containing Asprin _____days before surgery  - Stop taking blood thinners_______days before surgery  - Refrain from drinking alcoholic beverages for 24hours before and after surgery  - Stop or limit smoking _________days before surgery  Night before Surgery  - DO NOT EAT OR DRINK ANYTHING AFTER MIDNIGHT, INCLUDING GUM, HARD CANDY, MINTS, OR CHEWING TOBACCO.  - Take a shower or bath (shower is recommended).  Bathe with Hibiclens soap or an antibacterial soap from the neck down.  If not supplied by your surgeon, hibiclens soap will need to be purchased over the counter in pharmacy.  Rinse soap off thoroughly.  - Shampoo your hair with your regular shampoo  The Day of Surgery  - Take another bath or shower with hibiclens or any antibacterial soap, to reduce the chance of infection.  - Take heart and blood  pressure medications with a small sip of water, as advised by the perioperative team.  - Do not take fluid pills  - You may brush your teeth and rinse your mouth, but do not swall any additional water.   - Do not apply perfumes, powder, body lotions or deodorant on the day of surgery.  - Nail polish should be removed.  - Do not wear makeup or moisturizer  - Wear comfortable clothes, such as a button front shirt and loose fitting pants.  - Leave all jewelry, including body piercings, and valuables at home.    - Bring any devices you will neeed after surgery such as crutches or canes.  - If you have sleep apnea, please bring your CPAP machine  In the event that your physical condition changes including the onset of a cold or respiratory illness, or if you have to delay or cancel your surgery, please notify your surgeon.Anesthesia: General Anesthesia  Youre due to have surgery. During surgery, youll be given medication called anesthesia. (It is also called anesthetic.) This will keep you comfortable and pain-free. Your surgeon will use general anesthesia. This sheet tells you more about it.    What Is General Anesthesia?  General anesthesia puts you into a state like deep sleep. It goes into the bloodstream (IV anesthetics), into the lungs (gas anesthetics), or both. You feel nothing during the procedure. You will not remember it. During the procedure, the anesthesia provider monitors you. He or she checks your heart rate and rhythm, blood pressure, and blood oxygen.  · IV Anesthetics  IV anesthetics are given through an IV line in your arm. Theyre often given first. This is so you are asleep before a gas anesthetic is started. Some kinds of IV anesthetics relieve pain. Others relax you. Your doctor will decide which kind is best in your case.  · Gas Anesthetics  Gas anesthetics are breathed into the lungs. They are often used to keep you asleep. They can be given through a facemask, an endotracheal tube, or a  laryngeal mask airway.  ¨ If you have a facemask, your anesthesia provider will most likely place it over your nose and mouth while youre still awake. Youll breathe oxygen through the mask as your IV anesthetic is started. Gas anesthetic may be added through the mask.  ¨ If you have an endotracheal tube or a laryngeal mask airway, it will be inserted into your throat after youre asleep.  Anesthesia Tools and Medications  You will likely have:  · IV anesthetics sent through an IV line into your bloodstream.  · Gas anesthetics breathed into your lungs, where they pass into your bloodstream.  · A pulse oximeter on the end of your finger. This measures your blood oxygen level.  · Electrocardiography leads (electrodes) on your chest. These record your heart rate and rhythm.  · A blood pressure cuff. This reads your blood pressure.  Risks and Possible Complications  General anesthesia has some risks. These include:  · Breathing problems  · Nausea and vomiting  · Sore throat or hoarseness  · Allergic reaction to the anesthetic  · Ongoing numbness (rare)  · Irregular heartbeat (rare)  · Cardiac arrest (rare)   Anesthesia Safety  · Follow all instructions you are given for how long not to eat or drink before your procedure.  · Be sure your doctor knows what medications you take. This includes over-the-counter medications, herbs, and supplements.  · Have an adult family member or friend drive you home after the procedure.  · For the first 24 hours after your surgery:  ¨ Do not drive or use heavy equipment.  ¨ Do not make important decisions or sign documents.  ¨ Avoid alcohol.  ¨ Have someone stay with you, if possible. They can watch for problems and help keep you safe.  © 5906-6176 [employer], [address] [place] [address], [place] [address]. All rights reserved. This information is not intended as a substitute for professional medical care. Always follow your healthcare professional's instructions.

## 2017-05-17 NOTE — PROGRESS NOTES
Chief complaint-Preoperative evaluation , Perioperative Medical management, complication reduction plan     Date of Evaluation- 05/17/2017    PCP-  Donald Kelly MD    Future cases for Alondra Bryan [[id number]]     Case ID Status Date Time Daniel Procedure Provider Location    [id number] [facility] 6/5/2017  7:00  THORACOSCOPY-VIDEO ASSISTED (VATS) W/ LOBECTOMY Lawson Angelo MD [[id number]] [facility] OR 2ND FLR          History of present illness- I had the pleasure of meeting this pleasant 57 y.o. lady in the pre op clinic prior to her elective  chest surgery. The patient is new to me .    I have obtained the history by speaking to the patient and by reviewing the electronic health records.    Events leading up to surgery / History of presenting illness -    RLL lung mass.  The lesion was seen in February 2017 at which time the patient presented with a persistent cough.  A f/u chest CT showed an ~8mm size increase over a 2 month time course.  A navigational bronchoscopy was negative for malignancy   possibility of a malignant neoplasm   Still has cough , starts as a dry cough until produces Clear - tan Coloured phlegm   No hemoptysis   Had sinus surgery in the oast and has post nasal drip , Subjectively feels that the cough is from post nasal drip , reflux and asthma  Cough is worse on laying down  Been on Lisinopril for 6 years   Acid reflux tips discussed ( Suggested limiting , spicy foods, tomato based foods, coffee , avoiding laying down right after eating )  Has mild pain back of the Rt base after needle biopsy on and off , on deep breathing  Non tender    Relevant health conditions of significance for the perioperative period/ History of presenting illness -    Asthma- Had child morocho asthma and has asthma on laughing hard or on heavy exertion  No wheezing    Acid reflux- Diagnosed long time ago   Feels an acid taste in the throat , om laying down  No radiation, no exertional chest discomfort    Hypertension ,On  medication  Usual BP readings about 120/70-80    Her father  at 36 of MI and due to that reason had a stress test     No evidence of stress induced myocardial ischemia.     Has occasional chest discomfort , for 1 month, lasting for a few seconds, central, non tender, non radiation, on coughing, no associated sweating , nausea, vomiting, diarrhea    Fluttering feeling in the chest , on occasions, daily,few seconds, no passing out, chest pain   Suggested cutting down on coffee     Active cardiac conditions- none    Revised cardiac risk index predictors- High-risk type of surgery      Functional capacity -Examples of physical activity, walks a mile every morning, was doing yoga until cough started,took 5 flights of stairs down this AM,   can take 1 flight of stairs,  raking lawn , painting,  planting shrubs,  weeding garden,  swimming ,  dancing  and  digging ----- She can undertake all the above activities without  chest pain,chest tightness, Shortness of breath ,dizziness,lightheadedness making her exercise tolerance more  than 4 Mets.       Review of symptoms    ROS    Constitutional - No significant weight changes ,No fever  Eyes- No new vision changes  ENT- STOP BANG - elevated blood pressure and age over 50  Body mass index is 28.55 kg/(m^2).  Cardiac-As above   Respiratory- no hemoptysis and  post nasal drip  GI- Bowel movements diarrhea after gall bladder removal   No overt GI/ blood losses   Hysterectomy for fibroids  -No dysuria and  no urinary hesitancy   MS-As above  Neurologic-No unilateral weakness   Psychiatric- No depression,Anxiety  Endocrine-  Prednisone use for over 3 weeks -no  Hematological/Lymphatic-No spontaneous bruising, bleeding    Past Medical history- reviewed in EPIC    Pertinent negatives-   DVT-  Pulmonary embolism-  Heart disease-   Vascular stenting -    Past Surgical history - reviewed in EPIC    Most recent surgery / procedure- Pulmonary Procedure 2017  No  Anaesthetic,Bleeding ,Cardiac problems with previous surgeries-  No history of delirium -  History of post operative  nausea, vomiting     Family history- reviewed in EPIC    Heart disease- - age of onset   Thrombosis- None  Anaesthetic complications- None  Diabetes Mellitus - none    Social history- reviewed in EPIC    Lives with   Retired from [employer]  Help available post op     Medications and Allergies - reviewed in EPIC    Exam    Physical Exam  Constitutional- Vitals - Body mass index is 28.55 kg/(m^2).,   Vitals:    05/17/17 0919   BP: 114/68   Pulse: 76   Temp: 98 °F (36.7 °C)   sat 99 %  General appearance-Conscious,Coherent  Eyes- No conjunctival icterus,pupils  round  and reactive to light   ENT-Oral cavity- moist  , Hearing grossly normal   Neck- No thyromegaly ,Trachea -central, No jugular venous distension,   No Carotid Bruit   Cardiovascular -Heart Sounds- Normal  and  no murmur   , No gallop rhythm   Respiratory - no tender rt posterior base , Normal Respiratory Effort, Normal breath sounds,  no wheeze  and  no forced expiratory wheeze    Peripheral pitting pedal edema-- none  and  bilateral lower extremity telangiectasia , no calf pain   Gastrointestinal -Soft abdomen, No palpable masses, Non Tender,Liver,Spleen not palpable. No-- free fluid and shifting dullness  Musculoskeletal- No finger Clubbing. Strength grossly normal   Lymphatic-No Palpable cervical, axillary,Inguinal lymphadenopathy   Psychiatric - normal effect,Orientation  Rt Dorsalis pedis pulses-palpable    Lt Dorsalis pedis pulses- palpable   Rt Posterior tibial pulses -palpable   Left posterior tibial pulses -palpable   Miscellaneous -  no asterixis,  no dupuytren's contracture and  no renal bruit   No chest wall tenderness    Investigations    Review of Medicine tests    EKG- I had independently reviewed the EKG from--2/6/2015   No ischemic changes    Review of clinical lab tests-Date--3/6/2017 - Creatinine-0.79  Date--3/6/2017  Hemoglobin--N  Platelet count--N      1 - Concentric remodeling.     2 - Normal left ventricular systolic function (EF 60-65%).     3 - Normal left ventricular diastolic function.     4 - Normal right ventricular systolic function .     No evidence of stress induced myocardial ischemia.     Review of old records- Was done and information gathered regards to events leading to surgery and health conditions of significance in the perioperative period    Orders placed-EKG      Assessment and plan    New problems to me      Preoperative  risk assessment    Cardiac    Revised cardiac risk index ( RCRI ) predictors-1 ---.Functional capacity  Is more than 4 Mets. She will be undergoing a chest procedure that carries a high risk     The estimated risk of the rate of adverse cardiac outcomes   0.9 %  No further cardiac work up is indicated prior to proceeding with the surgery     American Society of Anesthesiologists Physical status classification ( ASA ) class- - 2    Postoperative pulmonary complication risk assessment    ARISCAT ( Canet) risk index- risk class - intermediate ,    if duration of surgery is under3 hours ,higher, if duration of surgery is over 3 hours       Perioperative Medical management / Optimization    Hormone replacement therapy   Risk of thrombosis discussed    Rosacea - Doxycycline alternate nights    Hypertension-  Blood pressure is acceptable I suggest holding Lisinopril  on the morning of the surgery and can continue that  post operatively under blood pressure, electrolyte and renal function monitoring as long as they are acceptable.I suggest addressing pain control as uncontrolled pain can increased blood pressure     GERD-  I suggest continuation of the Proton pump inhibitor in the perioperative period . I suggest aspiration precautions  Suggested avoidance of food that can cause GERD periop    Meloxicam- for 4 years - takes after food  Risk of renal , GI effects with chronic use discussed      Post operative nausea, vomiting - I suggest that the perioperative team be aware of this so that appropriate preventive care can be taken     Asthma- Had child morocho asthma and has asthma on laughing hard or on heavy exertion  No wheezing    Cough- likely from acid reflux    Atypical chest pain - does not sound cardiac  EKG reordered    Fluttering in the chest  Likely caffeine related       Preventive perioperative care    Thromboembolic prophylaxis:  Her risk factors for thrombosis include surgical procedure, age and Hormone replacement therapy.I suggest  thromboembolic prophylaxis ( mechanical/pharmacological, weighing the risk benefits of pharmacological agent use considering bear procedural bleeding )  during the perioperative period.I suggested being active in the post operative period.     Postoperative pulmonary complication prophylaxis-Risk factors for post operative pulmonary complications include proximity of the surgical site to the lungs- I suggest incentive Spirometry use  and  early ambulation       Renal complication prophylaxis-Risk factors for renal complications include Hypertension . I suggest keeping her well hydrated.I suggested drinking 2 litre's of water a day     Surgical site Infection Prophylaxis-I  suggest appropriate antibiotic for Prophylaxis against Surgical site infections    This visit was focussed on Preoperative evaluation , Perioperative Medical management, complication reduction plans and I suggest that the patient follows up with the primary care ,relevant sub specialists for on going health care      I appreciate the opportunity to be involved in this  pleasant  lady's care.Please feel free to contact me if there were any questions about this consultation     Patient is optimized  for the planned surgery    Dr MARIA VICTORIA Burgos MD [facility] ( ),[facility]   Center for Perioperative Medicine  [facility]   Pager [phone], Cell ( [phone]  [phone]  ---------------------    5/17- 19 37     Tattoos  Flonase  ---------------------------------------------------------------    5/18- 17 49    Labs from 5/17 0 Hb, HCT, PLt - N  CMP-N    EKG 5/17/2017     Normal sinus rhythm  Normal ECG  When compared with ECG of 06-FEB-2015 10:12,  No significant change was found    [facility] Respiratory failure index- percentage risk of respiratory failure- 4.2 %     Spoke to patient about above  ------------------------------------    5/24- 9 03     Patient corresponded to me through patient portal about her noticing  small amounts of blood in her sputum on the morning of 5 /23  (first time ever).   Called to speak to her , unable to speak to her , left a message     Was able to speak to her   One time coughed a streak of blood yesterday morning, none since  No fever, no chest pain, calf swelling, SOB  Not taking Meloxicam  ---------------------------    5/24- 16 36     Corresponded to Thoracic Surgery  If the hemoptysis becomes more frequent,her case may be moved up    Spoke to patient about the above  She wants to leave her surgery as it is , for now , since she has not had any more hemoptysis today   -----------------------------------    5/31- 18 47     Called and spoke to patient   Doing well  No more hemoptysis

## 2017-05-18 RX ORDER — FLUTICASONE PROPIONATE 50 MCG
2 SPRAY, SUSPENSION (ML) NASAL DAILY
COMMUNITY
End: 2018-01-05

## 2017-05-23 ENCOUNTER — PATIENT MESSAGE (OUTPATIENT)
Dept: INTERNAL MEDICINE | Facility: CLINIC | Age: 57
End: 2017-05-23

## 2017-06-01 ENCOUNTER — TELEPHONE (OUTPATIENT)
Dept: CARDIOTHORACIC SURGERY | Facility: CLINIC | Age: 57
End: 2017-06-01

## 2017-06-05 ENCOUNTER — ANESTHESIA (OUTPATIENT)
Dept: SURGERY | Facility: HOSPITAL | Age: 57
DRG: 165 | End: 2017-06-05
Payer: COMMERCIAL

## 2017-06-05 ENCOUNTER — HOSPITAL ENCOUNTER (INPATIENT)
Facility: HOSPITAL | Age: 57
LOS: 3 days | Discharge: HOME | DRG: 165 | End: 2017-06-08
Attending: THORACIC SURGERY (CARDIOTHORACIC VASCULAR SURGERY) | Admitting: THORACIC SURGERY (CARDIOTHORACIC VASCULAR SURGERY)
Payer: COMMERCIAL

## 2017-06-05 DIAGNOSIS — I10 ESSENTIAL HYPERTENSION: Primary | ICD-10-CM

## 2017-06-05 DIAGNOSIS — R91.1 LUNG NODULE: ICD-10-CM

## 2017-06-05 DIAGNOSIS — R91.8 RIGHT LOWER LOBE LUNG MASS: ICD-10-CM

## 2017-06-05 DIAGNOSIS — R07.89 ATYPICAL CHEST PAIN: ICD-10-CM

## 2017-06-05 LAB
ABO + RH BLD: NORMAL
ALBUMIN SERPL BCP-MCNC: 2.9 G/DL
ALP SERPL-CCNC: 58 U/L
ALT SERPL W/O P-5'-P-CCNC: 166 U/L
ANION GAP SERPL CALC-SCNC: 9 MMOL/L
AST SERPL-CCNC: 199 U/L
BASOPHILS # BLD AUTO: 0.01 K/UL
BASOPHILS NFR BLD: 0.1 %
BILIRUB SERPL-MCNC: 0.6 MG/DL
BLD GP AB SCN CELLS X3 SERPL QL: NORMAL
BUN SERPL-MCNC: 9 MG/DL
CALCIUM SERPL-MCNC: 7.5 MG/DL
CHLORIDE SERPL-SCNC: 107 MMOL/L
CO2 SERPL-SCNC: 22 MMOL/L
CREAT SERPL-MCNC: 0.7 MG/DL
DIFFERENTIAL METHOD: ABNORMAL
EOSINOPHIL # BLD AUTO: 0 K/UL
EOSINOPHIL NFR BLD: 0.3 %
ERYTHROCYTE [DISTWIDTH] IN BLOOD BY AUTOMATED COUNT: 13.1 %
EST. GFR  (AFRICAN AMERICAN): >60 ML/MIN/1.73 M^2
EST. GFR  (NON AFRICAN AMERICAN): >60 ML/MIN/1.73 M^2
GLUCOSE SERPL-MCNC: 162 MG/DL
GRAM STN SPEC: NORMAL
GRAM STN SPEC: NORMAL
HCT VFR BLD AUTO: 34.2 %
HGB BLD-MCNC: 11.3 G/DL
LYMPHOCYTES # BLD AUTO: 1.6 K/UL
LYMPHOCYTES NFR BLD: 11.3 %
MCH RBC QN AUTO: 28.3 PG
MCHC RBC AUTO-ENTMCNC: 33 %
MCV RBC AUTO: 86 FL
MONOCYTES # BLD AUTO: 0.9 K/UL
MONOCYTES NFR BLD: 5.9 %
NEUTROPHILS # BLD AUTO: 11.9 K/UL
NEUTROPHILS NFR BLD: 82.1 %
PLATELET # BLD AUTO: 204 K/UL
PMV BLD AUTO: 9.7 FL
POTASSIUM SERPL-SCNC: 3.9 MMOL/L
PROT SERPL-MCNC: 5.5 G/DL
RBC # BLD AUTO: 4 M/UL
SODIUM SERPL-SCNC: 138 MMOL/L
WBC # BLD AUTO: 14.49 K/UL

## 2017-06-05 PROCEDURE — 63600175 PHARM REV CODE 636 W HCPCS: Performed by: PHYSICIAN ASSISTANT

## 2017-06-05 PROCEDURE — 87075 CULTR BACTERIA EXCEPT BLOOD: CPT

## 2017-06-05 PROCEDURE — 88307 TISSUE EXAM BY PATHOLOGIST: CPT | Mod: 26,,, | Performed by: PATHOLOGY

## 2017-06-05 PROCEDURE — 85025 COMPLETE CBC W/AUTO DIFF WBC: CPT

## 2017-06-05 PROCEDURE — 3E0T3CZ INTRODUCE REGIONAL ANESTH IN PERIPH NRV, PLEXI, PERC: ICD-10-PCS | Performed by: THORACIC SURGERY (CARDIOTHORACIC VASCULAR SURGERY)

## 2017-06-05 PROCEDURE — D9220A PRA ANESTHESIA: Mod: CRNA,,, | Performed by: NURSE ANESTHETIST, CERTIFIED REGISTERED

## 2017-06-05 PROCEDURE — 88331 PATH CONSLTJ SURG 1 BLK 1SPC: CPT | Mod: 26,,, | Performed by: PATHOLOGY

## 2017-06-05 PROCEDURE — D9220A PRA ANESTHESIA: Mod: ANES,,, | Performed by: ANESTHESIOLOGY

## 2017-06-05 PROCEDURE — 27201423 OPTIME MED/SURG SUP & DEVICES STERILE SUPPLY: Performed by: THORACIC SURGERY (CARDIOTHORACIC VASCULAR SURGERY)

## 2017-06-05 PROCEDURE — 32097 OPEN WEDGE/BX LUNG NODULE: CPT | Mod: ,,, | Performed by: THORACIC SURGERY (CARDIOTHORACIC VASCULAR SURGERY)

## 2017-06-05 PROCEDURE — C1729 CATH, DRAINAGE: HCPCS | Performed by: THORACIC SURGERY (CARDIOTHORACIC VASCULAR SURGERY)

## 2017-06-05 PROCEDURE — 07B70ZX EXCISION OF THORAX LYMPHATIC, OPEN APPROACH, DIAGNOSTIC: ICD-10-PCS | Performed by: THORACIC SURGERY (CARDIOTHORACIC VASCULAR SURGERY)

## 2017-06-05 PROCEDURE — 94761 N-INVAS EAR/PLS OXIMETRY MLT: CPT

## 2017-06-05 PROCEDURE — 27100025 HC TUBING, SET FLUID WARMER: Performed by: NURSE ANESTHETIST, CERTIFIED REGISTERED

## 2017-06-05 PROCEDURE — 37000008 HC ANESTHESIA 1ST 15 MINUTES: Performed by: THORACIC SURGERY (CARDIOTHORACIC VASCULAR SURGERY)

## 2017-06-05 PROCEDURE — 0BBF0ZX EXCISION OF RIGHT LOWER LUNG LOBE, OPEN APPROACH, DIAGNOSTIC: ICD-10-PCS | Performed by: THORACIC SURGERY (CARDIOTHORACIC VASCULAR SURGERY)

## 2017-06-05 PROCEDURE — 36000710: Performed by: THORACIC SURGERY (CARDIOTHORACIC VASCULAR SURGERY)

## 2017-06-05 PROCEDURE — 80053 COMPREHEN METABOLIC PANEL: CPT

## 2017-06-05 PROCEDURE — 63600175 PHARM REV CODE 636 W HCPCS: Performed by: STUDENT IN AN ORGANIZED HEALTH CARE EDUCATION/TRAINING PROGRAM

## 2017-06-05 PROCEDURE — 71000033 HC RECOVERY, INTIAL HOUR: Performed by: THORACIC SURGERY (CARDIOTHORACIC VASCULAR SURGERY)

## 2017-06-05 PROCEDURE — 94799 UNLISTED PULMONARY SVC/PX: CPT

## 2017-06-05 PROCEDURE — 27201037 HC PRESSURE MONITORING SET UP

## 2017-06-05 PROCEDURE — 0BJK4ZZ INSPECTION OF RIGHT LUNG, PERCUTANEOUS ENDOSCOPIC APPROACH: ICD-10-PCS | Performed by: THORACIC SURGERY (CARDIOTHORACIC VASCULAR SURGERY)

## 2017-06-05 PROCEDURE — 36620 INSERTION CATHETER ARTERY: CPT | Performed by: ANESTHESIOLOGY

## 2017-06-05 PROCEDURE — 88305 TISSUE EXAM BY PATHOLOGIST: CPT | Performed by: PATHOLOGY

## 2017-06-05 PROCEDURE — 71000039 HC RECOVERY, EACH ADD'L HOUR: Performed by: THORACIC SURGERY (CARDIOTHORACIC VASCULAR SURGERY)

## 2017-06-05 PROCEDURE — 63600175 PHARM REV CODE 636 W HCPCS: Performed by: NURSE ANESTHETIST, CERTIFIED REGISTERED

## 2017-06-05 PROCEDURE — 38746 REMOVE THORACIC LYMPH NODES: CPT | Mod: ,,, | Performed by: THORACIC SURGERY (CARDIOTHORACIC VASCULAR SURGERY)

## 2017-06-05 PROCEDURE — 25000003 PHARM REV CODE 250: Performed by: PHYSICIAN ASSISTANT

## 2017-06-05 PROCEDURE — 86920 COMPATIBILITY TEST SPIN: CPT

## 2017-06-05 PROCEDURE — 87070 CULTURE OTHR SPECIMN AEROBIC: CPT

## 2017-06-05 PROCEDURE — 20600001 HC STEP DOWN PRIVATE ROOM

## 2017-06-05 PROCEDURE — 86900 BLOOD TYPING SEROLOGIC ABO: CPT

## 2017-06-05 PROCEDURE — 87116 MYCOBACTERIA CULTURE: CPT

## 2017-06-05 PROCEDURE — 87102 FUNGUS ISOLATION CULTURE: CPT

## 2017-06-05 PROCEDURE — 63600175 PHARM REV CODE 636 W HCPCS: Performed by: SURGERY

## 2017-06-05 PROCEDURE — 25000003 PHARM REV CODE 250: Performed by: STUDENT IN AN ORGANIZED HEALTH CARE EDUCATION/TRAINING PROGRAM

## 2017-06-05 PROCEDURE — 36415 COLL VENOUS BLD VENIPUNCTURE: CPT

## 2017-06-05 PROCEDURE — 88305 TISSUE EXAM BY PATHOLOGIST: CPT | Mod: 26,,, | Performed by: PATHOLOGY

## 2017-06-05 PROCEDURE — 25000003 PHARM REV CODE 250: Performed by: SURGERY

## 2017-06-05 PROCEDURE — 36000711: Performed by: THORACIC SURGERY (CARDIOTHORACIC VASCULAR SURGERY)

## 2017-06-05 PROCEDURE — 87205 SMEAR GRAM STAIN: CPT

## 2017-06-05 PROCEDURE — 37000009 HC ANESTHESIA EA ADD 15 MINS: Performed by: THORACIC SURGERY (CARDIOTHORACIC VASCULAR SURGERY)

## 2017-06-05 PROCEDURE — 88311 DECALCIFY TISSUE: CPT | Mod: 26,,, | Performed by: PATHOLOGY

## 2017-06-05 PROCEDURE — 25000003 PHARM REV CODE 250: Performed by: THORACIC SURGERY (CARDIOTHORACIC VASCULAR SURGERY)

## 2017-06-05 PROCEDURE — 86901 BLOOD TYPING SEROLOGIC RH(D): CPT

## 2017-06-05 RX ORDER — NALOXONE HCL 0.4 MG/ML
0.02 VIAL (ML) INJECTION
Status: DISCONTINUED | OUTPATIENT
Start: 2017-06-05 | End: 2017-06-06

## 2017-06-05 RX ORDER — PROMETHAZINE HYDROCHLORIDE 25 MG/1
25 SUPPOSITORY RECTAL EVERY 6 HOURS PRN
Status: DISCONTINUED | OUTPATIENT
Start: 2017-06-05 | End: 2017-06-08 | Stop reason: HOSPADM

## 2017-06-05 RX ORDER — ACETAMINOPHEN 325 MG/1
650 TABLET ORAL EVERY 8 HOURS PRN
Status: DISCONTINUED | OUTPATIENT
Start: 2017-06-05 | End: 2017-06-08 | Stop reason: HOSPADM

## 2017-06-05 RX ORDER — HYDROMORPHONE HCL IN 0.9% NACL 6 MG/30 ML
PATIENT CONTROLLED ANALGESIA SYRINGE INTRAVENOUS CONTINUOUS
Status: DISCONTINUED | OUTPATIENT
Start: 2017-06-05 | End: 2017-06-06

## 2017-06-05 RX ORDER — ONDANSETRON 2 MG/ML
4 INJECTION INTRAMUSCULAR; INTRAVENOUS EVERY 8 HOURS PRN
Status: DISCONTINUED | OUTPATIENT
Start: 2017-06-05 | End: 2017-06-08 | Stop reason: HOSPADM

## 2017-06-05 RX ORDER — ENOXAPARIN SODIUM 100 MG/ML
40 INJECTION SUBCUTANEOUS
Status: DISCONTINUED | OUTPATIENT
Start: 2017-06-05 | End: 2017-06-05

## 2017-06-05 RX ORDER — PROPOFOL 10 MG/ML
VIAL (ML) INTRAVENOUS
Status: DISCONTINUED | OUTPATIENT
Start: 2017-06-05 | End: 2017-06-05

## 2017-06-05 RX ORDER — MIDAZOLAM HYDROCHLORIDE 1 MG/ML
INJECTION, SOLUTION INTRAMUSCULAR; INTRAVENOUS
Status: DISCONTINUED | OUTPATIENT
Start: 2017-06-05 | End: 2017-06-05

## 2017-06-05 RX ORDER — ACETAMINOPHEN 10 MG/ML
INJECTION, SOLUTION INTRAVENOUS
Status: DISCONTINUED | OUTPATIENT
Start: 2017-06-05 | End: 2017-06-05

## 2017-06-05 RX ORDER — ONDANSETRON 2 MG/ML
INJECTION INTRAMUSCULAR; INTRAVENOUS
Status: DISCONTINUED | OUTPATIENT
Start: 2017-06-05 | End: 2017-06-05

## 2017-06-05 RX ORDER — KETOROLAC TROMETHAMINE 30 MG/ML
INJECTION, SOLUTION INTRAMUSCULAR; INTRAVENOUS
Status: DISCONTINUED | OUTPATIENT
Start: 2017-06-05 | End: 2017-06-05

## 2017-06-05 RX ORDER — ALBUTEROL SULFATE 0.83 MG/ML
2.5 SOLUTION RESPIRATORY (INHALATION) EVERY 4 HOURS PRN
Status: DISCONTINUED | OUTPATIENT
Start: 2017-06-05 | End: 2017-06-08 | Stop reason: HOSPADM

## 2017-06-05 RX ORDER — LIDOCAINE HCL/PF 100 MG/5ML
SYRINGE (ML) INTRAVENOUS
Status: DISCONTINUED | OUTPATIENT
Start: 2017-06-05 | End: 2017-06-05

## 2017-06-05 RX ORDER — SODIUM CHLORIDE 0.9 % (FLUSH) 0.9 %
3 SYRINGE (ML) INJECTION
Status: DISCONTINUED | OUTPATIENT
Start: 2017-06-05 | End: 2017-06-05 | Stop reason: HOSPADM

## 2017-06-05 RX ORDER — HYDROMORPHONE HYDROCHLORIDE 1 MG/ML
0.2 INJECTION, SOLUTION INTRAMUSCULAR; INTRAVENOUS; SUBCUTANEOUS EVERY 5 MIN PRN
Status: CANCELLED | OUTPATIENT
Start: 2017-06-05

## 2017-06-05 RX ORDER — KETOROLAC TROMETHAMINE 15 MG/ML
15 INJECTION, SOLUTION INTRAMUSCULAR; INTRAVENOUS EVERY 6 HOURS
Status: DISCONTINUED | OUTPATIENT
Start: 2017-06-06 | End: 2017-06-08 | Stop reason: HOSPADM

## 2017-06-05 RX ORDER — SODIUM CHLORIDE, SODIUM LACTATE, POTASSIUM CHLORIDE, CALCIUM CHLORIDE 600; 310; 30; 20 MG/100ML; MG/100ML; MG/100ML; MG/100ML
INJECTION, SOLUTION INTRAVENOUS CONTINUOUS
Status: DISCONTINUED | OUTPATIENT
Start: 2017-06-05 | End: 2017-06-06

## 2017-06-05 RX ORDER — HYDROMORPHONE HYDROCHLORIDE 2 MG/ML
INJECTION, SOLUTION INTRAMUSCULAR; INTRAVENOUS; SUBCUTANEOUS
Status: DISCONTINUED | OUTPATIENT
Start: 2017-06-05 | End: 2017-06-05

## 2017-06-05 RX ORDER — PHENYLEPHRINE HYDROCHLORIDE 10 MG/ML
INJECTION INTRAVENOUS
Status: DISCONTINUED | OUTPATIENT
Start: 2017-06-05 | End: 2017-06-05

## 2017-06-05 RX ORDER — DIPHENHYDRAMINE HYDROCHLORIDE 50 MG/ML
12.5 INJECTION INTRAMUSCULAR; INTRAVENOUS EVERY 6 HOURS PRN
Status: DISCONTINUED | OUTPATIENT
Start: 2017-06-05 | End: 2017-06-06

## 2017-06-05 RX ORDER — GLYCOPYRROLATE 0.2 MG/ML
INJECTION INTRAMUSCULAR; INTRAVENOUS
Status: DISCONTINUED | OUTPATIENT
Start: 2017-06-05 | End: 2017-06-05

## 2017-06-05 RX ORDER — SODIUM CHLORIDE 9 MG/ML
INJECTION, SOLUTION INTRAVENOUS CONTINUOUS
Status: DISCONTINUED | OUTPATIENT
Start: 2017-06-05 | End: 2017-06-05

## 2017-06-05 RX ORDER — NEOSTIGMINE METHYLSULFATE 1 MG/ML
INJECTION, SOLUTION INTRAVENOUS
Status: DISCONTINUED | OUTPATIENT
Start: 2017-06-05 | End: 2017-06-05

## 2017-06-05 RX ORDER — SODIUM CHLORIDE 0.9 % (FLUSH) 0.9 %
3 SYRINGE (ML) INJECTION
Status: CANCELLED | OUTPATIENT
Start: 2017-06-05

## 2017-06-05 RX ORDER — CLINDAMYCIN PHOSPHATE 900 MG/50ML
900 INJECTION, SOLUTION INTRAVENOUS
Status: COMPLETED | OUTPATIENT
Start: 2017-06-05 | End: 2017-06-05

## 2017-06-05 RX ORDER — ROCURONIUM BROMIDE 10 MG/ML
INJECTION, SOLUTION INTRAVENOUS
Status: DISCONTINUED | OUTPATIENT
Start: 2017-06-05 | End: 2017-06-05

## 2017-06-05 RX ORDER — HYDROMORPHONE HYDROCHLORIDE 1 MG/ML
0.2 INJECTION, SOLUTION INTRAMUSCULAR; INTRAVENOUS; SUBCUTANEOUS EVERY 5 MIN PRN
Status: DISCONTINUED | OUTPATIENT
Start: 2017-06-05 | End: 2017-06-05 | Stop reason: HOSPADM

## 2017-06-05 RX ORDER — ENOXAPARIN SODIUM 100 MG/ML
40 INJECTION SUBCUTANEOUS
Status: DISCONTINUED | OUTPATIENT
Start: 2017-06-06 | End: 2017-06-08 | Stop reason: HOSPADM

## 2017-06-05 RX ORDER — LIDOCAINE HYDROCHLORIDE 10 MG/ML
1 INJECTION, SOLUTION EPIDURAL; INFILTRATION; INTRACAUDAL; PERINEURAL ONCE
Status: COMPLETED | OUTPATIENT
Start: 2017-06-05 | End: 2017-06-05

## 2017-06-05 RX ORDER — FENTANYL CITRATE 50 UG/ML
INJECTION, SOLUTION INTRAMUSCULAR; INTRAVENOUS
Status: DISCONTINUED | OUTPATIENT
Start: 2017-06-05 | End: 2017-06-05

## 2017-06-05 RX ADMIN — HYDROMORPHONE HYDROCHLORIDE 0.4 MG: 2 INJECTION, SOLUTION INTRAMUSCULAR; INTRAVENOUS; SUBCUTANEOUS at 06:06

## 2017-06-05 RX ADMIN — ONDANSETRON 4 MG: 2 INJECTION INTRAMUSCULAR; INTRAVENOUS at 05:06

## 2017-06-05 RX ADMIN — ACETAMINOPHEN 1000 MG: 10 INJECTION, SOLUTION INTRAVENOUS at 02:06

## 2017-06-05 RX ADMIN — NEOSTIGMINE METHYLSULFATE 4 MG: 1 INJECTION INTRAVENOUS at 06:06

## 2017-06-05 RX ADMIN — HYDROMORPHONE HYDROCHLORIDE 0.2 MG: 2 INJECTION, SOLUTION INTRAMUSCULAR; INTRAVENOUS; SUBCUTANEOUS at 05:06

## 2017-06-05 RX ADMIN — Medication: at 11:06

## 2017-06-05 RX ADMIN — SODIUM CHLORIDE, SODIUM LACTATE, POTASSIUM CHLORIDE, AND CALCIUM CHLORIDE: .6; .31; .03; .02 INJECTION, SOLUTION INTRAVENOUS at 06:06

## 2017-06-05 RX ADMIN — PHENYLEPHRINE HYDROCHLORIDE 100 MCG: 10 INJECTION INTRAVENOUS at 05:06

## 2017-06-05 RX ADMIN — SODIUM CHLORIDE, SODIUM GLUCONATE, SODIUM ACETATE, POTASSIUM CHLORIDE, MAGNESIUM CHLORIDE, SODIUM PHOSPHATE, DIBASIC, AND POTASSIUM PHOSPHATE: .53; .5; .37; .037; .03; .012; .00082 INJECTION, SOLUTION INTRAVENOUS at 02:06

## 2017-06-05 RX ADMIN — MIDAZOLAM HYDROCHLORIDE 2 MG: 1 INJECTION, SOLUTION INTRAMUSCULAR; INTRAVENOUS at 01:06

## 2017-06-05 RX ADMIN — FENTANYL CITRATE 50 MCG: 50 INJECTION, SOLUTION INTRAMUSCULAR; INTRAVENOUS at 04:06

## 2017-06-05 RX ADMIN — ROCURONIUM BROMIDE 10 MG: 10 INJECTION, SOLUTION INTRAVENOUS at 05:06

## 2017-06-05 RX ADMIN — PHENYLEPHRINE HYDROCHLORIDE 50 MCG: 10 INJECTION INTRAVENOUS at 05:06

## 2017-06-05 RX ADMIN — FENTANYL CITRATE 50 MCG: 50 INJECTION, SOLUTION INTRAMUSCULAR; INTRAVENOUS at 03:06

## 2017-06-05 RX ADMIN — ROCURONIUM BROMIDE 50 MG: 10 INJECTION, SOLUTION INTRAVENOUS at 01:06

## 2017-06-05 RX ADMIN — SODIUM CHLORIDE: 0.9 INJECTION, SOLUTION INTRAVENOUS at 10:06

## 2017-06-05 RX ADMIN — ENOXAPARIN SODIUM 40 MG: 100 INJECTION SUBCUTANEOUS at 10:06

## 2017-06-05 RX ADMIN — LIDOCAINE HYDROCHLORIDE: 10 INJECTION, SOLUTION EPIDURAL; INFILTRATION; INTRACAUDAL; PERINEURAL at 10:06

## 2017-06-05 RX ADMIN — KETOROLAC TROMETHAMINE 15 MG: 15 INJECTION, SOLUTION INTRAMUSCULAR; INTRAVENOUS at 11:06

## 2017-06-05 RX ADMIN — FENTANYL CITRATE 100 MCG: 50 INJECTION, SOLUTION INTRAMUSCULAR; INTRAVENOUS at 02:06

## 2017-06-05 RX ADMIN — PHENYLEPHRINE HYDROCHLORIDE 100 MCG: 10 INJECTION INTRAVENOUS at 03:06

## 2017-06-05 RX ADMIN — PROPOFOL 50 MG: 10 INJECTION, EMULSION INTRAVENOUS at 01:06

## 2017-06-05 RX ADMIN — SODIUM CHLORIDE, SODIUM GLUCONATE, SODIUM ACETATE, POTASSIUM CHLORIDE, MAGNESIUM CHLORIDE, SODIUM PHOSPHATE, DIBASIC, AND POTASSIUM PHOSPHATE: .53; .5; .37; .037; .03; .012; .00082 INJECTION, SOLUTION INTRAVENOUS at 03:06

## 2017-06-05 RX ADMIN — PROPOFOL 150 MG: 10 INJECTION, EMULSION INTRAVENOUS at 01:06

## 2017-06-05 RX ADMIN — FENTANYL CITRATE 100 MCG: 50 INJECTION, SOLUTION INTRAMUSCULAR; INTRAVENOUS at 01:06

## 2017-06-05 RX ADMIN — GLYCOPYRROLATE 0.2 MG: 0.2 INJECTION, SOLUTION INTRAMUSCULAR; INTRAVENOUS at 01:06

## 2017-06-05 RX ADMIN — CLINDAMYCIN PHOSPHATE 900 MG: 18 INJECTION, SOLUTION INTRAVENOUS at 01:06

## 2017-06-05 RX ADMIN — Medication: at 06:06

## 2017-06-05 RX ADMIN — SODIUM CHLORIDE, SODIUM GLUCONATE, SODIUM ACETATE, POTASSIUM CHLORIDE, MAGNESIUM CHLORIDE, SODIUM PHOSPHATE, DIBASIC, AND POTASSIUM PHOSPHATE: .53; .5; .37; .037; .03; .012; .00082 INJECTION, SOLUTION INTRAVENOUS at 01:06

## 2017-06-05 RX ADMIN — DIPHENHYDRAMINE HYDROCHLORIDE 12.5 MG: 50 INJECTION, SOLUTION INTRAMUSCULAR; INTRAVENOUS at 09:06

## 2017-06-05 RX ADMIN — GLYCOPYRROLATE 0.5 MG: 0.2 INJECTION, SOLUTION INTRAMUSCULAR; INTRAVENOUS at 06:06

## 2017-06-05 RX ADMIN — FENTANYL CITRATE 150 MCG: 50 INJECTION, SOLUTION INTRAMUSCULAR; INTRAVENOUS at 01:06

## 2017-06-05 RX ADMIN — KETOROLAC TROMETHAMINE 30 MG: 30 INJECTION, SOLUTION INTRAMUSCULAR; INTRAVENOUS at 05:06

## 2017-06-05 RX ADMIN — LIDOCAINE HYDROCHLORIDE 20 MG: 20 INJECTION, SOLUTION INTRAVENOUS at 01:06

## 2017-06-05 RX ADMIN — ONDANSETRON 4 MG: 2 INJECTION INTRAMUSCULAR; INTRAVENOUS at 09:06

## 2017-06-05 NOTE — BRIEF OP NOTE
[facility]  Brief Operative Note    SUMMARY     Surgery Date: 6/5/2017     Surgeon(s) and Role:     * Lawson Angelo MD - Primary     * Chiquita Becerra MD - Resident - Assisting     * Jose Ramirez MD - Resident - Assisting        Pre-op Diagnosis:  Right lower lobe lung mass [R91.8]    Post-op Diagnosis:  Post-Op Diagnosis Codes:     * Right lower lobe lung mass [R91.8]    Procedure(s) (LRB):  DISSECTION-LYMPH NODE (Right)  THORACOSCOPY-VIDEO ASSISTED (VATS) (Right)  BIOPSY-LUNG (Right)    Anesthesia: General    Description of Procedure: right vats converted to thoracotomy    Description of the findings of the procedure: right sided VATS converted to thoracotomy. Unable to remove right lower lobe due to severe inflammation and thickened abnormal tissues around pulmonary vessels. Lymph node dissection of level 7, 10 and 11. Rene chest tube to right chest x2.     Estimated Blood Loss: 300 mL         Specimens:   Specimen (12h ago through future)    Start     Ordered    06/05/17 1644  Specimen to Pathology - Surgery  Once     Comments:  1. Level 7 lymph node (frozen)2. Level 7 lymph node (permanent)3. Level 10 lymph node (frozen)4. Level 11 lymph node (permanent)5. Fifth rib (permanent)6. Right lower lobe tissue (permanent)7. Level 10 lymph node (permanent)8. Lower level 11 lymph node (permanent)      06/05/17 5309

## 2017-06-05 NOTE — H&P
History and Physical     Chief Complaint: Consult (RLL Lung mass)        HPI:  HPI Comments: She presents for evaluation of a progressive RLL lung mass.  The lesion was seen in February at which time the patient presented with a persistent cough.  A f/u chest CT showed an ~8mm size increase over a 2 month time course.  A navigational bronchoscopy was negative for malignancy.  I reviewed the images which show a nearly 5cm RLL medial basilar lung mass nearly abutting the mediastinal pleural surface.  It contains a focal area of calcification and has irregular borders.        Review of Systems   Constitutional: Negative.    HENT: Positive for postnasal drip.    Eyes: Negative.    Respiratory: Positive for cough.    Cardiovascular: Negative.    Gastrointestinal: Negative.    Endocrine: Negative.    Musculoskeletal: Negative.    Skin: Negative.    Allergic/Immunologic: Negative.    Neurological: Negative.    Hematological: Negative.    Psychiatric/Behavioral: Negative.          Current Medications          Current Outpatient Prescriptions   Medication Sig Dispense Refill    ARMOUR THYROID 15 mg Tab once daily.         ARMOUR THYROID 60 mg Tab once daily.    3    COMPOUND HORMONE REPLACEMENT Take by mouth once daily. biest (50/50) prog/test/dhea 2/5/150/1.5/5mg in each arm        cyanocobalamin 1,000 mcg/mL injection 1,000 mcg every 28 days.        doxycycline (ORACEA) 40 mg capsule Take 40 mg by mouth every other day.         lisinopril 10 MG tablet TAKE 1 TABLET BY MOUTH ONCE A DAY 30 tablet 12    meloxicam (MOBIC) 15 MG tablet Take 15 mg by mouth as needed.    12    METHYL-B12/L-MEFOLATE/B6 PHOS (LEVMEFOLATE-B6 PHOS-METHYL-B12 ORAL) Take 1 tablet by mouth once daily.        multivitamin with folic acid (ONE DAILY ESSENTIAL) 400 mcg Tab Take 1 tablet by mouth once daily.        pantoprazole (PROTONIX) 40 MG tablet Take 1 tablet (40 mg total) by mouth once daily. 30 tablet 0    SOOLANTRA 1 % Crea every other  day.         VITAMIN D2 50,000 unit capsule 50,000 Units every 7 days.    0      No current facility-administered medications for this visit.                  Review of patient's allergies indicates:   Allergen Reactions    Pcn [penicillins] Hives              Past Medical History:   Diagnosis Date    Arthritis      Herpes simplex without mention of complication      Hypertension      Hypothyroidism                 Past Surgical History:   Procedure Laterality Date    BREAST SURGERY Bilateral       breast biopsy    CHOLECYSTECTOMY N/A      HYSTERECTOMY        fibroids    SINUS SURGERY                   Family History   Problem Relation Age of Onset    Heart attack Father      Heart disease Father      Hypertension Brother      Lymphoma Sister      Hypertension Sister      Cancer Sister      Hypothyroidism Mother      Breast cancer Neg Hx      Colon cancer Neg Hx      Diabetes Neg Hx      Eclampsia Neg Hx      Miscarriages / Stillbirths Neg Hx      Ovarian cancer Neg Hx       labor Neg Hx      Stroke Neg Hx            Social History    Social History            Social History    Marital status: Single       Spouse name: N/A    Number of children: N/A    Years of education: N/A           Occupational History    [employer]              Social History Main Topics    Smoking status: Former Smoker       Packs/day: 1.00       Years: 30.00       Quit date: 1984    Smokeless tobacco: Not on file         Comment: quit 20 yrs ago    Alcohol use 1.2 oz/week        2 Glasses of wine per week          Comment: drink wine every day    Drug use: No    Sexual activity: Yes       Partners: Male       Birth control/ protection: Surgical, Other-see comments         Comment: Hysterectomy      Other Topics Concern    Not on file          Social History Narrative     Patient is a [employer] worker.                  Vitals:    17 1003   BP: 131/75   Pulse: 65   Resp: 16    Temp: 98.1 °F (36.7 °C)          Physical Exam   Constitutional: She is oriented to person, place, and time. She appears well-developed and well-nourished.   HENT:   Head: Normocephalic and atraumatic.   Right Ear: External ear normal.   Left Ear: External ear normal.   Mouth/Throat: Oropharynx is clear and moist.   Eyes: Conjunctivae and EOM are normal. Pupils are equal, round, and reactive to light.   Neck: Normal range of motion. No JVD present. No tracheal deviation present. No thyromegaly present.   Cardiovascular: Normal rate, regular rhythm, normal heart sounds and intact distal pulses.  Exam reveals no gallop and no friction rub.    No murmur heard.  Pulmonary/Chest: Effort normal and breath sounds normal. No stridor. No respiratory distress. She has no wheezes. She exhibits no tenderness.   Abdominal: Soft. Bowel sounds are normal.   Musculoskeletal: Normal range of motion.   Lymphadenopathy:     She has no cervical adenopathy.   Neurological: She is alert and oriented to person, place, and time. She has normal reflexes.   Skin: Skin is warm and dry.   Psychiatric: She has a normal mood and affect.   Vitals reviewed.    Assessment & Plan:      58yo former smoker with persistent cough, a progressive partially calcified RLL basilar segmental lung mass, and a negative navigational bronchoscopy.  The lesion size, location, growth and remote smoking history all raise concerns about the possibility of a malignant neoplasm.  The calcification and growth are also suggestive of a possible inflammatory process.  Removal will require lower lobectomy based upon the size and location.  I recommend a RVATS, RLLobectomy with MLND.  There is the potential need for conversion to right minithoracotomy based upon intraoperative findings.  I offered the patient a surgery date on 5/15/2017.  She has several family events (graduations) and would like to defer the date until June 5th, 2017.  I have discussed the technical  aspects, risks and benefits of the procedure with the patient.  I did inform the patient that the risks are the most common risks and that there are other less likely risks that are too numerous to elaborate.  The patient is aware and has agreed to undergo the procedure as detailed on the consent form.    Jose Ramirez PGY5

## 2017-06-05 NOTE — ANESTHESIA PROCEDURE NOTES
Arterial    Diagnosis: Lung mass   Doctor requesting consult: Dr. Angelo    Patient location during procedure: done in OR  Procedure start time: 6/5/2017 2:21 PM  Timeout: 6/5/2017 2:20 PM  Procedure end time: 6/5/2017 2:25 PM  Staffing  Anesthesiologist: MEGHAN DUMONT  Resident/CRNA: FELA SMITH  Performed: resident/CRNA   Anesthesiologist was present at the time of the procedure.  Preanesthetic Checklist  Completed: patient identified, site marked, surgical consent, pre-op evaluation, timeout performed, IV checked, risks and benefits discussed, monitors and equipment checked and anesthesia consent givenArterial  Skin Prep: chlorhexidine gluconate  Local Infiltration: lidocaine  Orientation: left  Location: radial  Catheter Size: 20 GInsertion Attempts: 1  Assessment  Dressing: secured with tape and tegaderm  Patient: Tolerated well

## 2017-06-05 NOTE — TRANSFER OF CARE
"Anesthesia Transfer of Care Note    Patient: Alondra Bryan    Procedure(s) Performed: Procedure(s) (LRB):  DISSECTION-LYMPH NODE (Right)  THORACOSCOPY-VIDEO ASSISTED (VATS) (Right)  BIOPSY-LUNG (Right)    Patient location: PACU    Anesthesia Type: general    Transport from OR: Transported from OR on 6-10 L/min O2 by face mask with adequate spontaneous ventilation    Post pain: adequate analgesia    Post assessment: no apparent anesthetic complications and tolerated procedure well    Post vital signs: stable    Level of consciousness: awake, alert and oriented    Nausea/Vomiting: no nausea/vomiting    Complications: none    Transfer of care protocol was followed      Last vitals:   Visit Vitals  /63 arterial  112/66 (BP Location: Right arm, Patient Position: Lying, BP Method: Automatic)   Pulse 81   Temp 37.4 °C (99.4 °F) (Axillary)   Resp 20   Ht 5' 3" (1.6 m)   Wt 73 kg (161 lb)   SpO2 100%   Breastfeeding? No   BMI 28.52 kg/m²     "

## 2017-06-06 LAB
ALBUMIN SERPL BCP-MCNC: 3.1 G/DL
ALBUMIN SERPL BCP-MCNC: 3.1 G/DL
ALP SERPL-CCNC: 58 U/L
ALP SERPL-CCNC: 59 U/L
ALT SERPL W/O P-5'-P-CCNC: 122 U/L
ALT SERPL W/O P-5'-P-CCNC: 123 U/L
ANION GAP SERPL CALC-SCNC: 7 MMOL/L
ANION GAP SERPL CALC-SCNC: 7 MMOL/L
AST SERPL-CCNC: 80 U/L
AST SERPL-CCNC: 82 U/L
BACTERIA #/AREA URNS AUTO: ABNORMAL /HPF
BASOPHILS # BLD AUTO: 0.01 K/UL
BASOPHILS # BLD AUTO: 0.03 K/UL
BASOPHILS NFR BLD: 0.1 %
BASOPHILS NFR BLD: 0.2 %
BILIRUB SERPL-MCNC: 0.6 MG/DL
BILIRUB SERPL-MCNC: 0.6 MG/DL
BILIRUB UR QL STRIP: NEGATIVE
BUN SERPL-MCNC: 11 MG/DL
BUN SERPL-MCNC: 11 MG/DL
CALCIUM SERPL-MCNC: 8.7 MG/DL
CALCIUM SERPL-MCNC: 8.8 MG/DL
CHLORIDE SERPL-SCNC: 105 MMOL/L
CHLORIDE SERPL-SCNC: 105 MMOL/L
CLARITY UR REFRACT.AUTO: ABNORMAL
CO2 SERPL-SCNC: 26 MMOL/L
CO2 SERPL-SCNC: 26 MMOL/L
COLOR UR AUTO: YELLOW
CREAT SERPL-MCNC: 0.8 MG/DL
CREAT SERPL-MCNC: 0.8 MG/DL
DIFFERENTIAL METHOD: ABNORMAL
DIFFERENTIAL METHOD: ABNORMAL
EOSINOPHIL # BLD AUTO: 0.1 K/UL
EOSINOPHIL # BLD AUTO: 0.1 K/UL
EOSINOPHIL NFR BLD: 0.6 %
EOSINOPHIL NFR BLD: 0.7 %
ERYTHROCYTE [DISTWIDTH] IN BLOOD BY AUTOMATED COUNT: 13.3 %
ERYTHROCYTE [DISTWIDTH] IN BLOOD BY AUTOMATED COUNT: 13.4 %
EST. GFR  (AFRICAN AMERICAN): >60 ML/MIN/1.73 M^2
EST. GFR  (AFRICAN AMERICAN): >60 ML/MIN/1.73 M^2
EST. GFR  (NON AFRICAN AMERICAN): >60 ML/MIN/1.73 M^2
EST. GFR  (NON AFRICAN AMERICAN): >60 ML/MIN/1.73 M^2
GLUCOSE SERPL-MCNC: 113 MG/DL
GLUCOSE SERPL-MCNC: 114 MG/DL
GLUCOSE UR QL STRIP: NEGATIVE
HCT VFR BLD AUTO: 35.6 %
HCT VFR BLD AUTO: 35.8 %
HGB BLD-MCNC: 11.5 G/DL
HGB BLD-MCNC: 11.6 G/DL
HGB UR QL STRIP: ABNORMAL
HYALINE CASTS UR QL AUTO: 0 /LPF
KETONES UR QL STRIP: NEGATIVE
LEUKOCYTE ESTERASE UR QL STRIP: ABNORMAL
LYMPHOCYTES # BLD AUTO: 2 K/UL
LYMPHOCYTES # BLD AUTO: 2.1 K/UL
LYMPHOCYTES NFR BLD: 14.5 %
LYMPHOCYTES NFR BLD: 15.7 %
MAGNESIUM SERPL-MCNC: 2.1 MG/DL
MAGNESIUM SERPL-MCNC: 2.1 MG/DL
MCH RBC QN AUTO: 28.2 PG
MCH RBC QN AUTO: 28.4 PG
MCHC RBC AUTO-ENTMCNC: 32.3 %
MCHC RBC AUTO-ENTMCNC: 32.4 %
MCV RBC AUTO: 87 FL
MCV RBC AUTO: 88 FL
MICROSCOPIC COMMENT: ABNORMAL
MONOCYTES # BLD AUTO: 1 K/UL
MONOCYTES # BLD AUTO: 1.1 K/UL
MONOCYTES NFR BLD: 7.2 %
MONOCYTES NFR BLD: 7.8 %
NEUTROPHILS # BLD AUTO: 10.1 K/UL
NEUTROPHILS # BLD AUTO: 10.6 K/UL
NEUTROPHILS NFR BLD: 76 %
NEUTROPHILS NFR BLD: 76.7 %
NITRITE UR QL STRIP: NEGATIVE
PH UR STRIP: 5 [PH] (ref 5–8)
PHOSPHATE SERPL-MCNC: 3.4 MG/DL
PHOSPHATE SERPL-MCNC: 3.5 MG/DL
PLATELET # BLD AUTO: 230 K/UL
PLATELET # BLD AUTO: 231 K/UL
PMV BLD AUTO: 9.7 FL
PMV BLD AUTO: 9.8 FL
POTASSIUM SERPL-SCNC: 4.5 MMOL/L
POTASSIUM SERPL-SCNC: 4.6 MMOL/L
PROT SERPL-MCNC: 6.1 G/DL
PROT SERPL-MCNC: 6.1 G/DL
PROT UR QL STRIP: ABNORMAL
RBC # BLD AUTO: 4.08 M/UL
RBC # BLD AUTO: 4.09 M/UL
RBC #/AREA URNS AUTO: >100 /HPF (ref 0–4)
SODIUM SERPL-SCNC: 138 MMOL/L
SODIUM SERPL-SCNC: 138 MMOL/L
SP GR UR STRIP: 1.03 (ref 1–1.03)
SQUAMOUS #/AREA URNS AUTO: 1 /HPF
URN SPEC COLLECT METH UR: ABNORMAL
UROBILINOGEN UR STRIP-ACNC: NEGATIVE EU/DL
WBC # BLD AUTO: 13.33 K/UL
WBC # BLD AUTO: 13.87 K/UL
WBC #/AREA URNS AUTO: 5 /HPF (ref 0–5)

## 2017-06-06 PROCEDURE — 83735 ASSAY OF MAGNESIUM: CPT | Mod: 91

## 2017-06-06 PROCEDURE — 20600001 HC STEP DOWN PRIVATE ROOM

## 2017-06-06 PROCEDURE — 81001 URINALYSIS AUTO W/SCOPE: CPT

## 2017-06-06 PROCEDURE — 84100 ASSAY OF PHOSPHORUS: CPT | Mod: 91

## 2017-06-06 PROCEDURE — 80053 COMPREHEN METABOLIC PANEL: CPT | Mod: 91

## 2017-06-06 PROCEDURE — C9113 INJ PANTOPRAZOLE SODIUM, VIA: HCPCS | Performed by: STUDENT IN AN ORGANIZED HEALTH CARE EDUCATION/TRAINING PROGRAM

## 2017-06-06 PROCEDURE — 63600175 PHARM REV CODE 636 W HCPCS: Performed by: STUDENT IN AN ORGANIZED HEALTH CARE EDUCATION/TRAINING PROGRAM

## 2017-06-06 PROCEDURE — 63600175 PHARM REV CODE 636 W HCPCS: Performed by: SURGERY

## 2017-06-06 PROCEDURE — 85025 COMPLETE CBC W/AUTO DIFF WBC: CPT

## 2017-06-06 PROCEDURE — 25000003 PHARM REV CODE 250: Performed by: STUDENT IN AN ORGANIZED HEALTH CARE EDUCATION/TRAINING PROGRAM

## 2017-06-06 PROCEDURE — 36415 COLL VENOUS BLD VENIPUNCTURE: CPT

## 2017-06-06 RX ORDER — CALCIUM CARBONATE 200(500)MG
500 TABLET,CHEWABLE ORAL 3 TIMES DAILY PRN
Status: DISCONTINUED | OUTPATIENT
Start: 2017-06-06 | End: 2017-06-08 | Stop reason: HOSPADM

## 2017-06-06 RX ORDER — OXYCODONE AND ACETAMINOPHEN 10; 325 MG/1; MG/1
1 TABLET ORAL EVERY 4 HOURS PRN
Status: DISCONTINUED | OUTPATIENT
Start: 2017-06-06 | End: 2017-06-08 | Stop reason: HOSPADM

## 2017-06-06 RX ORDER — CALCIUM CARBONATE 200(500)MG
1000 TABLET,CHEWABLE ORAL ONCE
Status: COMPLETED | OUTPATIENT
Start: 2017-06-06 | End: 2017-06-06

## 2017-06-06 RX ORDER — SYRING-NEEDL,DISP,INSUL,0.3 ML 29 G X1/2"
296 SYRINGE, EMPTY DISPOSABLE MISCELLANEOUS ONCE
Status: COMPLETED | OUTPATIENT
Start: 2017-06-07 | End: 2017-06-07

## 2017-06-06 RX ORDER — DIPHENHYDRAMINE HCL 25 MG
25 CAPSULE ORAL EVERY 6 HOURS PRN
Status: DISCONTINUED | OUTPATIENT
Start: 2017-06-06 | End: 2017-06-08 | Stop reason: HOSPADM

## 2017-06-06 RX ORDER — OXYCODONE AND ACETAMINOPHEN 5; 325 MG/1; MG/1
1 TABLET ORAL EVERY 4 HOURS PRN
Status: DISCONTINUED | OUTPATIENT
Start: 2017-06-06 | End: 2017-06-08 | Stop reason: HOSPADM

## 2017-06-06 RX ORDER — SYRING-NEEDL,DISP,INSUL,0.3 ML 29 G X1/2"
296 SYRINGE, EMPTY DISPOSABLE MISCELLANEOUS ONCE
Status: DISCONTINUED | OUTPATIENT
Start: 2017-06-06 | End: 2017-06-06

## 2017-06-06 RX ORDER — PANTOPRAZOLE SODIUM 40 MG/10ML
40 INJECTION, POWDER, LYOPHILIZED, FOR SOLUTION INTRAVENOUS DAILY
Status: DISCONTINUED | OUTPATIENT
Start: 2017-06-06 | End: 2017-06-08 | Stop reason: HOSPADM

## 2017-06-06 RX ORDER — HYDROMORPHONE HYDROCHLORIDE 1 MG/ML
0.5 INJECTION, SOLUTION INTRAMUSCULAR; INTRAVENOUS; SUBCUTANEOUS EVERY 4 HOURS PRN
Status: DISCONTINUED | OUTPATIENT
Start: 2017-06-06 | End: 2017-06-08 | Stop reason: HOSPADM

## 2017-06-06 RX ORDER — DEXTROSE MONOHYDRATE, SODIUM CHLORIDE, AND POTASSIUM CHLORIDE 50; 1.49; 4.5 G/1000ML; G/1000ML; G/1000ML
INJECTION, SOLUTION INTRAVENOUS CONTINUOUS
Status: DISCONTINUED | OUTPATIENT
Start: 2017-06-06 | End: 2017-06-06

## 2017-06-06 RX ORDER — POLYETHYLENE GLYCOL 3350 17 G/17G
17 POWDER, FOR SOLUTION ORAL DAILY
Status: DISCONTINUED | OUTPATIENT
Start: 2017-06-06 | End: 2017-06-08 | Stop reason: HOSPADM

## 2017-06-06 RX ADMIN — HYDROMORPHONE HYDROCHLORIDE 0.5 MG: 1 INJECTION, SOLUTION INTRAMUSCULAR; INTRAVENOUS; SUBCUTANEOUS at 07:06

## 2017-06-06 RX ADMIN — OXYCODONE HYDROCHLORIDE AND ACETAMINOPHEN 1 TABLET: 10; 325 TABLET ORAL at 05:06

## 2017-06-06 RX ADMIN — HYDROMORPHONE HYDROCHLORIDE 0.5 MG: 1 INJECTION, SOLUTION INTRAMUSCULAR; INTRAVENOUS; SUBCUTANEOUS at 03:06

## 2017-06-06 RX ADMIN — DIPHENHYDRAMINE HYDROCHLORIDE 25 MG: 25 CAPSULE ORAL at 09:06

## 2017-06-06 RX ADMIN — ENOXAPARIN SODIUM 40 MG: 100 INJECTION SUBCUTANEOUS at 09:06

## 2017-06-06 RX ADMIN — OXYCODONE HYDROCHLORIDE AND ACETAMINOPHEN 1 TABLET: 5; 325 TABLET ORAL at 09:06

## 2017-06-06 RX ADMIN — DIPHENHYDRAMINE HYDROCHLORIDE 12.5 MG: 50 INJECTION, SOLUTION INTRAMUSCULAR; INTRAVENOUS at 04:06

## 2017-06-06 RX ADMIN — KETOROLAC TROMETHAMINE 15 MG: 15 INJECTION, SOLUTION INTRAMUSCULAR; INTRAVENOUS at 05:06

## 2017-06-06 RX ADMIN — ONDANSETRON 4 MG: 2 INJECTION INTRAMUSCULAR; INTRAVENOUS at 11:06

## 2017-06-06 RX ADMIN — Medication: at 05:06

## 2017-06-06 RX ADMIN — CALCIUM CARBONATE (ANTACID) CHEW TAB 500 MG 1000 MG: 500 CHEW TAB at 05:06

## 2017-06-06 RX ADMIN — PANTOPRAZOLE SODIUM 40 MG: 40 INJECTION, POWDER, FOR SOLUTION INTRAVENOUS at 09:06

## 2017-06-06 RX ADMIN — KETOROLAC TROMETHAMINE 15 MG: 15 INJECTION, SOLUTION INTRAMUSCULAR; INTRAVENOUS at 12:06

## 2017-06-06 RX ADMIN — DIPHENHYDRAMINE HYDROCHLORIDE 12.5 MG: 50 INJECTION, SOLUTION INTRAMUSCULAR; INTRAVENOUS at 09:06

## 2017-06-06 RX ADMIN — POLYETHYLENE GLYCOL 3350 17 G: 17 POWDER, FOR SOLUTION ORAL at 09:06

## 2017-06-06 RX ADMIN — DIPHENHYDRAMINE HYDROCHLORIDE 12.5 MG: 50 INJECTION, SOLUTION INTRAMUSCULAR; INTRAVENOUS at 05:06

## 2017-06-06 RX ADMIN — OXYCODONE HYDROCHLORIDE AND ACETAMINOPHEN 1 TABLET: 10; 325 TABLET ORAL at 01:06

## 2017-06-06 RX ADMIN — OXYCODONE HYDROCHLORIDE AND ACETAMINOPHEN 1 TABLET: 10; 325 TABLET ORAL at 09:06

## 2017-06-06 RX ADMIN — DEXTROSE MONOHYDRATE, SODIUM CHLORIDE, AND POTASSIUM CHLORIDE: 50; 4.5; 1.49 INJECTION, SOLUTION INTRAVENOUS at 09:06

## 2017-06-06 NOTE — NURSING TRANSFER
Nursing Transfer Note      6/5/2017     Transfer To: 646    Transfer via bed    Transfer with cardiac monitoring    Transported by RN & PCT    Medicines sent: MIVF & dilaudid PCA    Chart send with patient: Yes    Notified: daughter    Patient reassessed at: 6/5/17 @ 2045    Upon arrival to floor: patient oriented to room, call bell in reach and bed in lowest position

## 2017-06-06 NOTE — HPI
She presents for evaluation of a progressive RLL lung mass.  The lesion was seen in February at which time the patient presented with a persistent cough.  A f/u chest CT showed an ~8mm size increase over a 2 month time course.  A navigational bronchoscopy was negative for malignancy.  I reviewed the images which show a nearly 5cm RLL medial basilar lung mass nearly abutting the mediastinal pleural surface.  It contains a focal area of calcification and has irregular borders.

## 2017-06-06 NOTE — PROGRESS NOTES
Surgery team notified liver enzymes elevated post op. No new orders noted. Will continue to monitor.

## 2017-06-06 NOTE — PLAN OF CARE
Problem: Patient Care Overview  Goal: Plan of Care Review  Outcome: Ongoing (interventions implemented as appropriate)  Care plan reviewed with pt who verbalized understanding. VSS on room air. Pain well-controlled with prn percocet and dilaudid. Tolerating regular diet - no n/v. Has voided 300 cc since Hoff was discontinued - however, fluid intake >3,000. No BM, but passing gas. CT #1 to water seal - 200 cc output. Still c/o tingling - gave Tums. IS encouraged. SCDs in place when in bed. When not ambulating in doll, up in chair. Remains free of fall and injury. Will continue to monitor.

## 2017-06-06 NOTE — PLAN OF CARE
S/P Dissection lymph node right, R VATS, right lung bx on 6/5/17. CM visited with pt for dc planning assessment. No dc needs noted at this time. Will cont to follow.        06/06/17 1117   Discharge Assessment   Assessment Type Discharge Planning Assessment   Confirmed/corrected address and phone number on facesheet? Yes   Assessment information obtained from? Patient   Expected Length of Stay (days) 3   Communicated expected length of stay with patient/caregiver yes   Prior to hospitilization cognitive status: Alert/Oriented   Prior to hospitalization functional status: Independent   Current cognitive status: Alert/Oriented   Current Functional Status: Independent   Arrived From admitted as an inpatient   Lives With other (see comments)  (adalid)   Able to Return to Prior Arrangements yes   Is patient able to care for self after discharge? Yes   How many people do you have in your home that can help with your care after discharge? 1   Who are your caregiver(s) and their phone number(s)? Iker Rao/adalid [phone]   Patient's perception of discharge disposition home or selfcare   Readmission Within The Last 30 Days no previous admission in last 30 days   Patient currently being followed by outpatient case management? No   Patient currently receives home health services? No   Does the patient currently use HME? No   Patient currently receives private duty nursing? No   Patient currently receives any other outside agency services? No   Equipment Currently Used at Home none   Do you have any problems affording any of your prescribed medications? No   Is the patient taking medications as prescribed? yes   Do you have any financial concerns preventing you from receiving the healthcare you need? No   Does the patient have transportation to healthcare appointments? Yes   Transportation Available family or friend will provide   On Dialysis? No   Does the patient receive services at the Coumadin Clinic? No   Are  there any open cases? No   Discharge Plan A Home with family   Discharge Plan B Home with family;Home Health   Patient/Family In Agreement With Plan yes

## 2017-06-06 NOTE — PROGRESS NOTES
Pt complaining of some numbness and tingling in face and hands, pulses are +2 and states that they are warm and is in no pain. Pt's urine also a cloudy yellow, Dr. Silvestre notified of both and a urinalysis has been ordered and further orders will be made when team arrives in the morning. Will continue to monitor.

## 2017-06-06 NOTE — OP NOTE
DATE OF PROCEDURE:  06/05/2017    PREOPERATIVE DIAGNOSIS:  Right lower lobe lung mass.    POSTOPERATIVE DIAGNOSIS:  Right lower lobe lung mass.    PROCEDURES PERFORMED:  1. Exploratory right VATS.  2. Exploratory right thoracotomy, mediastinal lymph node dissection, intercostal   nerve block chest tube insertion.    SURGEON:  Lawson Angelo M.D.    FIRST ASSISTANT:  Jose Ramirez M.D. (RES)    SECOND ASSISTANT:  Chiquita Becerra M.D.(RES)    ANESTHESIA:  General and intercostal nerve block, liposomal phospholipid   intercostal nerve block 20 mL administered.    ESTIMATED BLOOD LOSS:  300 mL    INDICATIONS FOR PROCEDURE:  Mrs. Bryan is a 57-year-old who has a progressively   enlarging right lower lobe lung mass.  Multiple attempts at biopsy have been   nondiagnostic for malignancy.  Due to the progressive size increase, it was felt   that the patient should undergo a VATS right lower lobectomy for possible   diagnostic and therapeutic purposes.  Of note, the patient did have some   evidence of calcification within the primary lung mass.    PROCEDURE IN DETAIL:  The patient was taken to the Operating Room and placed   supine on the operating table.  The patient was identified.  IV antibiotics were   given.  The patient was intubated with double lumen tube and positioned in the   left lateral decubitus position.  All pressure points were protected.  A timeout   was performed.  The patient's right chest was prepped and draped in a sterile   fashion.  Single lung ventilation was started.  VATS ports were placed in the 8th interspace   and a utility incision in the 4th interspace.  Through these port sites a   thoracoscopic exploration demonstrated the mass within the basilar segment of   the right lower lobe; however, there was a considerable amount of hilar   lymphadenopathy.  I did not feel that the thoracoscopic approach would be   appropriate.  The utility incision was extended.  The fifth rib was divided.  The    mediastinal pleura was divided over the inferior pulmonary vein, circumferential   control was obtained over the right inferior pulmonary vein.    I explored the posterior   hilum and harvested bulky level 7 lymph nodes.  Frozen section analysis  revealed reactive lymphadenopathy.  Frozen section analysis of level 10 and   level  11 lymph nodes also showed reactive changes.  The fissure was incomplete.    After very tedious dissection, I identified the interlobar pulmonary artery.  A right   middle lobe branch was identified.  It was encased with a rubbery lymph node.  I   was able to expose the anterior and posterior walls of the basilar segmental   branch of the pulmonary artery; however, there was a very calcified lymph node   between the basilar segmental branch and the superior segmental branch.  There   were also calcified lymph nodes along the superior margin of the superior   segmental pulmonary artery branch. I felt that proximal control of the right main   pulmonary artery was necessary to allow for a safe dissection of the interlobar and  lower lobar artery.  The hilar lymph nodes near the right main pulmonary artery were   quite calcified.  I was unable to remove the hilar lymph nodes sufficiently to obtain   control of the right main pulmonary artery.  I engaged in a very lengthy and tedious   sharp dissection in an effort to try to create a dissection plane between the lower lobe  artery wall and the lymph nodes.  This was done under 3.5X loupe magnification to no   avail.  I then made an attempt to incise the lymph node in an effort to obtain   circumferential control of the artery and divide the artery with the aamir tissue attached.  The lymph node was so heavily calcified that I was unable to cut into it.  After another   hour of very tedious sharp dissection, I aborted any further attempts at obtaining circumferential  control of the artery out of concerns for possible catastrophic arterial    injury.  I made an incisional biopsy of the mass.  There was the immediate expression of  purulent drainage.  A culture was taken and a piece of the lung tissue and mass were sent for   permanent pathologic review.I placed Janie topical hemostatic agent along the hilar dissection   plane.  I then placed two 24-Finnish Rene drains into the right pleural space   and externalized them via separate incisions through the previously placed VATS   port sites.  The operative field was hemostatic.  The chest tubes were secured   using 0 silk suture.  The fourth and fifth ribs were reapproximated using   interrupted #2 pericostal sutures.  The muscle and soft tissue were closed in   multiple layers using #1 and #3-0 Stratafix suture.    I, Lawson Angelo, attest that I was present for and scrubbed for the entire   procedure.  Furthermore, I performed the critical and key portions of the   procedure as surgeon.      ILANA  dd: 06/05/2017 18:43:11 (CDT)  td: 06/06/2017 06:47:42 (KENIA)  Doc ID   #[id number]  Job ID #[id number]    CC:

## 2017-06-06 NOTE — ANESTHESIA RELEASE NOTE
"Anesthesia Release from PACU Note    Patient: Alondra Bryan    Procedure(s) Performed: Procedure(s) (LRB):  DISSECTION-LYMPH NODE (Right)  THORACOSCOPY-VIDEO ASSISTED (VATS) (Right)  BIOPSY-LUNG (Right)    Anesthesia type: general    Post pain: Adequate analgesia    Post assessment: no apparent anesthetic complications, tolerated procedure well and no evidence of recall    Last Vitals:   Visit Vitals  BP 95/62   Pulse 70   Temp 37.4 °C (99.4 °F) (Axillary)   Resp 17   Ht 5' 3" (1.6 m)   Wt 73 kg (161 lb)   SpO2 100%   Breastfeeding? No   BMI 28.52 kg/m²       Post vital signs: stable    Level of consciousness: awake, alert  and oriented    Nausea/Vomiting: no nausea/no vomiting    Complications: none    Airway Patency: patent    Respiratory: unassisted    Cardiovascular: stable and blood pressure at baseline    Hydration: euvolemic  "

## 2017-06-06 NOTE — HOSPITAL COURSE
6/5/17:   1. Exploratory right VATS.  2. Exploratory right thoracotomy, mediastinal lymph node dissection, intercostal   nerve block chest tube insertion.  6/6/17:Apical chest tube removed. Diet advanced. PO pain control. Ambulating.   6/7/17: Basilar chest tube removed. Pain well controlled. Repeat CXR stable.

## 2017-06-06 NOTE — PROGRESS NOTES
[facility]  General Surgery  Progress Note    Subjective:     History of Present Illness:  She presents for evaluation of a progressive RLL lung mass.  The lesion was seen in February at which time the patient presented with a persistent cough.  A f/u chest CT showed an ~8mm size increase over a 2 month time course.  A navigational bronchoscopy was negative for malignancy.  I reviewed the images which show a nearly 5cm RLL medial basilar lung mass nearly abutting the mediastinal pleural surface.  It contains a focal area of calcification and has irregular borders.    Post-Op Info:  Procedure(s) (LRB):  DISSECTION-LYMPH NODE (Right)  THORACOSCOPY-VIDEO ASSISTED (VATS) (Right)  BIOPSY-LUNG (Right)   1 Day Post-Op     Interval History: No acute events overnight. Pain well controlled with PCA. Itching relieved with benadryl. No n/v. No flatus or bowel function. Chest tube to suction, no air leak, SS output (201 #1, 150 #2). Complains of some numbness, tingling in bilateral hands, feet, and face. Calcium level normal. Moving extremities without issues.     Medications:  Continuous Infusions:   hydromorphone in 0.9 % NaCl 6 mg/30 ml      lactated ringers 125 mL/hr at 06/05/17 1858     Scheduled Meds:   enoxaparin  40 mg Subcutaneous Q24H    ketorolac  15 mg Intravenous Q6H    pantoprazole  40 mg Intravenous Daily     PRN Meds:acetaminophen, albuterol sulfate, diphenhydrAMINE, naloxone, ondansetron, promethazine     Review of patient's allergies indicates:   Allergen Reactions    Pcn [penicillins] Hives     Objective:     Vital Signs (Most Recent):  Temp: 98.4 °F (36.9 °C) (06/06/17 0709)  Pulse: 84 (06/06/17 0709)  Resp: 16 (06/06/17 0709)  BP: (!) 104/56 (06/06/17 0709)  SpO2: 99 % (06/06/17 0709) Vital Signs (24h Range):  Temp:  [98.1 °F (36.7 °C)-99.4 °F (37.4 °C)] 98.4 °F (36.9 °C)  Pulse:  [65-84] 84  Resp:  [13-21] 16  SpO2:  [94 %-100 %] 99 %  BP: ()/(55-75) 104/56  Arterial Line BP:  (104-134)/(56-61) 104/58     Weight: 73 kg (161 lb)  Body mass index is 28.52 kg/m².    Intake/Output - Last 3 Shifts       06/04 0700 - 06/05 0659 06/05 0700 - 06/06 0659 06/06 0700 - 06/07 0659    P.O.  0     I.V. (mL/kg)  4475.2 (61.3)     Total Intake(mL/kg)  4475.2 (61.3)     Urine (mL/kg/hr)  1190     Blood  300     Chest Tube  351     Total Output   1841      Net   +2634.2                 Physical Exam   Constitutional: She is oriented to person, place, and time. She appears well-developed and well-nourished. No distress.   HENT:   Head: Normocephalic and atraumatic.   Eyes: Pupils are equal, round, and reactive to light.   Cardiovascular: Normal rate and regular rhythm.    Pulmonary/Chest: Effort normal and breath sounds normal. No respiratory distress. She has no wheezes.   CT in place, no air leak, serosanguinous, tidals well    Abdominal: Soft. She exhibits no distension. There is no tenderness.   Musculoskeletal: Normal range of motion. She exhibits no edema or tenderness.   Neurological: She is alert and oriented to person, place, and time.   Skin: Skin is warm and dry.   Psychiatric: She has a normal mood and affect.     Significant Labs:  CBC:   Recent Labs  Lab 06/05/17  1822   WBC 14.49*   RBC 4.00   HGB 11.3*   HCT 34.2*      MCV 86   MCH 28.3   MCHC 33.0     CMP:   Recent Labs  Lab 06/05/17  1822   *   CALCIUM 7.5*   ALBUMIN 2.9*   PROT 5.5*      K 3.9   CO2 22*      BUN 9   CREATININE 0.7   ALKPHOS 58   *   *   BILITOT 0.6     Significant Diagnostics:  CXR: I have reviewed all pertinent results/findings within the past 24 hours and my personal findings are:  No ptx, well expanded.     Assessment/Plan:     * Right lower lobe lung mass    57 year old female with nearly 5cm RLL medial basilar lung mass nearly abutting the mediastinal pleural surface, s/p exploratory right VATS, exploratory right thoracotomy, mediastinal lymph node dissection, and intercostal  nerve block chest tube insertion.    - advance to clears  - decrease mIVF to 75  - d/c hadley  - d/c PCA, continue toradol and percocet prn, dilaudid btp  - f/u labs  - daily CXR; no ptx  - chest tube to water seal, d/c apical chest tube  - bowel regimen  - enoxaparin/ pantoprazole ppx              Chiquita Bell MD  General Surgery  [facility]

## 2017-06-06 NOTE — ASSESSMENT & PLAN NOTE
57 year old female with nearly 5cm RLL medial basilar lung mass nearly abutting the mediastinal pleural surface, s/p exploratory right VATS, exploratory right thoracotomy, mediastinal lymph node dissection, and intercostal nerve block chest tube insertion.    - advance to clears  - decrease mIVF to 75  - d/c hadley  - continue PCA, toradol  - f/u labs  - daily CXR, continue chest tube to water suction  - bowel regimen  - enoxaparin/ pantoprazole ppx

## 2017-06-06 NOTE — ANESTHESIA POSTPROCEDURE EVALUATION
"Anesthesia Post Evaluation    Patient: Alondra Bryan    Procedure(s) Performed: Procedure(s) (LRB):  DISSECTION-LYMPH NODE (Right)  THORACOSCOPY-VIDEO ASSISTED (VATS) (Right)  BIOPSY-LUNG (Right)    Final Anesthesia Type: general  Patient location during evaluation: PACU  Patient participation: Yes- Able to Participate  Level of consciousness: awake and alert and oriented  Post-procedure vital signs: reviewed and stable  Pain management: adequate  Airway patency: patent  PONV status at discharge: No PONV  Anesthetic complications: no      Cardiovascular status: blood pressure returned to baseline and hemodynamically stable  Respiratory status: unassisted and room air  Hydration status: euvolemic  Follow-up not needed.        Visit Vitals  BP 95/62   Pulse 70   Temp 37.4 °C (99.4 °F) (Axillary)   Resp 17   Ht 5' 3" (1.6 m)   Wt 73 kg (161 lb)   SpO2 100%   Breastfeeding? No   BMI 28.52 kg/m²       Pain/Taylor Score: Pain Assessment Performed: Yes (6/5/2017  8:45 PM)  Presence of Pain: complains of pain/discomfort (6/5/2017  8:45 PM)  Pain Rating Prior to Med Admin: 5 (6/5/2017  6:52 PM)  Taylor Score: 9 (6/5/2017  6:45 PM)      "

## 2017-06-06 NOTE — SUBJECTIVE & OBJECTIVE
Interval History: No acute events overnight. Pain well controlled with PCA. Itching relieved with benadryl. No n/v. No flatus or bowel function. Chest tube to suction, no air leak, SS output. Complains of some numbness, tingling in bilateral hands, feet, and face. Moving extremities without issues.     Medications:  Continuous Infusions:   hydromorphone in 0.9 % NaCl 6 mg/30 ml      lactated ringers 125 mL/hr at 06/05/17 1858     Scheduled Meds:   enoxaparin  40 mg Subcutaneous Q24H    ketorolac  15 mg Intravenous Q6H    pantoprazole  40 mg Intravenous Daily     PRN Meds:acetaminophen, albuterol sulfate, diphenhydrAMINE, naloxone, ondansetron, promethazine     Review of patient's allergies indicates:   Allergen Reactions    Pcn [penicillins] Hives     Objective:     Vital Signs (Most Recent):  Temp: 98.4 °F (36.9 °C) (06/06/17 0709)  Pulse: 84 (06/06/17 0709)  Resp: 16 (06/06/17 0709)  BP: (!) 104/56 (06/06/17 0709)  SpO2: 99 % (06/06/17 0709) Vital Signs (24h Range):  Temp:  [98.1 °F (36.7 °C)-99.4 °F (37.4 °C)] 98.4 °F (36.9 °C)  Pulse:  [65-84] 84  Resp:  [13-21] 16  SpO2:  [94 %-100 %] 99 %  BP: ()/(55-75) 104/56  Arterial Line BP: (104-134)/(56-61) 104/58     Weight: 73 kg (161 lb)  Body mass index is 28.52 kg/m².    Intake/Output - Last 3 Shifts       06/04 0700 - 06/05 0659 06/05 0700 - 06/06 0659 06/06 0700 - 06/07 0659    P.O.  0     I.V. (mL/kg)  4475.2 (61.3)     Total Intake(mL/kg)  4475.2 (61.3)     Urine (mL/kg/hr)  1190     Blood  300     Chest Tube  351     Total Output   1841      Net   +2634.2                 Physical Exam   Constitutional: She is oriented to person, place, and time. She appears well-developed and well-nourished. No distress.   HENT:   Head: Normocephalic and atraumatic.   Eyes: Pupils are equal, round, and reactive to light.   Cardiovascular: Normal rate and regular rhythm.    Pulmonary/Chest: Effort normal and breath sounds normal. No respiratory distress. She has no  wheezes.   CT in place, no air leak, serosanguinous, tidals well    Abdominal: Soft. She exhibits no distension. There is no tenderness.   Musculoskeletal: Normal range of motion. She exhibits no edema or tenderness.   Neurological: She is alert and oriented to person, place, and time.   Skin: Skin is warm and dry.   Psychiatric: She has a normal mood and affect.     Significant Labs:  CBC:   Recent Labs  Lab 06/05/17  1822   WBC 14.49*   RBC 4.00   HGB 11.3*   HCT 34.2*      MCV 86   MCH 28.3   MCHC 33.0     CMP:   Recent Labs  Lab 06/05/17  1822   *   CALCIUM 7.5*   ALBUMIN 2.9*   PROT 5.5*      K 3.9   CO2 22*      BUN 9   CREATININE 0.7   ALKPHOS 58   *   *   BILITOT 0.6     Significant Diagnostics:  CXR: I have reviewed all pertinent results/findings within the past 24 hours and my personal findings are:  No ptx, well expanded.

## 2017-06-07 LAB
ALBUMIN SERPL BCP-MCNC: 2.7 G/DL
ALP SERPL-CCNC: 52 U/L
ALT SERPL W/O P-5'-P-CCNC: 65 U/L
ANION GAP SERPL CALC-SCNC: 7 MMOL/L
AST SERPL-CCNC: 36 U/L
BASOPHILS # BLD AUTO: 0.02 K/UL
BASOPHILS NFR BLD: 0.2 %
BILIRUB SERPL-MCNC: 0.5 MG/DL
BUN SERPL-MCNC: 8 MG/DL
CALCIUM SERPL-MCNC: 8.5 MG/DL
CHLORIDE SERPL-SCNC: 96 MMOL/L
CO2 SERPL-SCNC: 27 MMOL/L
CREAT SERPL-MCNC: 0.9 MG/DL
DIFFERENTIAL METHOD: ABNORMAL
EOSINOPHIL # BLD AUTO: 0.5 K/UL
EOSINOPHIL NFR BLD: 3.8 %
ERYTHROCYTE [DISTWIDTH] IN BLOOD BY AUTOMATED COUNT: 13 %
EST. GFR  (AFRICAN AMERICAN): >60 ML/MIN/1.73 M^2
EST. GFR  (NON AFRICAN AMERICAN): >60 ML/MIN/1.73 M^2
GLUCOSE SERPL-MCNC: 133 MG/DL
HCT VFR BLD AUTO: 30.8 %
HGB BLD-MCNC: 10.5 G/DL
LYMPHOCYTES # BLD AUTO: 1.7 K/UL
LYMPHOCYTES NFR BLD: 13.9 %
MAGNESIUM SERPL-MCNC: 1.8 MG/DL
MCH RBC QN AUTO: 29.2 PG
MCHC RBC AUTO-ENTMCNC: 34.1 %
MCV RBC AUTO: 86 FL
MONOCYTES # BLD AUTO: 0.8 K/UL
MONOCYTES NFR BLD: 6.3 %
NEUTROPHILS # BLD AUTO: 9.1 K/UL
NEUTROPHILS NFR BLD: 75.5 %
PHOSPHATE SERPL-MCNC: 2.6 MG/DL
PLATELET # BLD AUTO: 214 K/UL
PMV BLD AUTO: 9.5 FL
POTASSIUM SERPL-SCNC: 3.9 MMOL/L
PROT SERPL-MCNC: 5.5 G/DL
RBC # BLD AUTO: 3.6 M/UL
SODIUM SERPL-SCNC: 130 MMOL/L
WBC # BLD AUTO: 12.09 K/UL

## 2017-06-07 PROCEDURE — 85025 COMPLETE CBC W/AUTO DIFF WBC: CPT

## 2017-06-07 PROCEDURE — 63600175 PHARM REV CODE 636 W HCPCS: Performed by: SURGERY

## 2017-06-07 PROCEDURE — C9113 INJ PANTOPRAZOLE SODIUM, VIA: HCPCS | Performed by: STUDENT IN AN ORGANIZED HEALTH CARE EDUCATION/TRAINING PROGRAM

## 2017-06-07 PROCEDURE — 20600001 HC STEP DOWN PRIVATE ROOM

## 2017-06-07 PROCEDURE — 84100 ASSAY OF PHOSPHORUS: CPT

## 2017-06-07 PROCEDURE — 80053 COMPREHEN METABOLIC PANEL: CPT

## 2017-06-07 PROCEDURE — 63600175 PHARM REV CODE 636 W HCPCS: Performed by: STUDENT IN AN ORGANIZED HEALTH CARE EDUCATION/TRAINING PROGRAM

## 2017-06-07 PROCEDURE — 36415 COLL VENOUS BLD VENIPUNCTURE: CPT

## 2017-06-07 PROCEDURE — 83735 ASSAY OF MAGNESIUM: CPT

## 2017-06-07 PROCEDURE — 25000003 PHARM REV CODE 250: Performed by: STUDENT IN AN ORGANIZED HEALTH CARE EDUCATION/TRAINING PROGRAM

## 2017-06-07 RX ADMIN — DIPHENHYDRAMINE HYDROCHLORIDE 25 MG: 25 CAPSULE ORAL at 09:06

## 2017-06-07 RX ADMIN — OXYCODONE HYDROCHLORIDE AND ACETAMINOPHEN 1 TABLET: 5; 325 TABLET ORAL at 04:06

## 2017-06-07 RX ADMIN — CALCIUM CARBONATE (ANTACID) CHEW TAB 500 MG 500 MG: 500 CHEW TAB at 06:06

## 2017-06-07 RX ADMIN — KETOROLAC TROMETHAMINE 15 MG: 15 INJECTION, SOLUTION INTRAMUSCULAR; INTRAVENOUS at 12:06

## 2017-06-07 RX ADMIN — OXYCODONE HYDROCHLORIDE AND ACETAMINOPHEN 1 TABLET: 5; 325 TABLET ORAL at 03:06

## 2017-06-07 RX ADMIN — POLYETHYLENE GLYCOL 3350 17 G: 17 POWDER, FOR SOLUTION ORAL at 08:06

## 2017-06-07 RX ADMIN — HYDROMORPHONE HYDROCHLORIDE 0.5 MG: 1 INJECTION, SOLUTION INTRAMUSCULAR; INTRAVENOUS; SUBCUTANEOUS at 12:06

## 2017-06-07 RX ADMIN — DIPHENHYDRAMINE HYDROCHLORIDE 25 MG: 25 CAPSULE ORAL at 03:06

## 2017-06-07 RX ADMIN — KETOROLAC TROMETHAMINE 15 MG: 15 INJECTION, SOLUTION INTRAMUSCULAR; INTRAVENOUS at 05:06

## 2017-06-07 RX ADMIN — KETOROLAC TROMETHAMINE 15 MG: 15 INJECTION, SOLUTION INTRAMUSCULAR; INTRAVENOUS at 06:06

## 2017-06-07 RX ADMIN — KETOROLAC TROMETHAMINE 15 MG: 15 INJECTION, SOLUTION INTRAMUSCULAR; INTRAVENOUS at 11:06

## 2017-06-07 RX ADMIN — OXYCODONE HYDROCHLORIDE AND ACETAMINOPHEN 1 TABLET: 5; 325 TABLET ORAL at 09:06

## 2017-06-07 RX ADMIN — MAGESIUM CITRATE 296 ML: 1.75 LIQUID ORAL at 08:06

## 2017-06-07 RX ADMIN — OXYCODONE HYDROCHLORIDE AND ACETAMINOPHEN 1 TABLET: 10; 325 TABLET ORAL at 09:06

## 2017-06-07 RX ADMIN — ENOXAPARIN SODIUM 40 MG: 100 INJECTION SUBCUTANEOUS at 08:06

## 2017-06-07 RX ADMIN — PANTOPRAZOLE SODIUM 40 MG: 40 INJECTION, POWDER, FOR SOLUTION INTRAVENOUS at 08:06

## 2017-06-07 NOTE — SUBJECTIVE & OBJECTIVE
Interval History: NAEON. Pail well controlled. Tolerating diet. Stable on room air. Remaining chest tube draining well.     Medications:  Continuous Infusions:   Scheduled Meds:   enoxaparin  40 mg Subcutaneous Q24H    ketorolac  15 mg Intravenous Q6H    magnesium citrate  296 mL Oral Once    pantoprazole  40 mg Intravenous Daily    polyethylene glycol  17 g Oral Daily     PRN Meds:acetaminophen, albuterol sulfate, calcium carbonate, diphenhydrAMINE, HYDROmorphone, ondansetron, oxycodone-acetaminophen, oxycodone-acetaminophen, promethazine     Review of patient's allergies indicates:   Allergen Reactions    Pcn [penicillins] Hives     Objective:     Vital Signs (Most Recent):  Temp: 98.9 °F (37.2 °C) (06/07/17 0845)  Pulse: 82 (06/07/17 0845)  Resp: 16 (06/07/17 0845)  BP: (!) 144/63 (06/07/17 0845)  SpO2: 96 % (06/07/17 0845) Vital Signs (24h Range):  Temp:  [97.9 °F (36.6 °C)-99.1 °F (37.3 °C)] 98.9 °F (37.2 °C)  Pulse:  [75-95] 82  Resp:  [16-18] 16  SpO2:  [93 %-100 %] 96 %  BP: (119-144)/(58-68) 144/63     Weight: 73 kg (161 lb)  Body mass index is 28.52 kg/m².    Intake/Output - Last 3 Shifts       06/05 0700 - 06/06 0659 06/06 0700 - 06/07 0659 06/07 0700 - 06/08 0659    P.O. 0 3720     I.V. (mL/kg) 4475.2 (61.3) 452.1 (6.2)     Total Intake(mL/kg) 4475.2 (61.3) 4172.1 (57.2)     Urine (mL/kg/hr) 1190 1450 (0.8)     Emesis/NG output  0 (0)     Stool  0 (0)     Blood 300      Chest Tube 351 250 (0.1)     Total Output 1841 1700      Net +2634.2 +2472.1             Stool Occurrence  0 x     Emesis Occurrence  0 x           Physical Exam   Constitutional: She is oriented to person, place, and time. She appears well-developed and well-nourished.   HENT:   Head: Normocephalic and atraumatic.   Eyes: Pupils are equal, round, and reactive to light.   Neck: Normal range of motion. Neck supple.   Cardiovascular: Normal rate and regular rhythm.    Pulmonary/Chest: Effort normal and breath sounds normal. No  respiratory distress. She exhibits tenderness.   Abdominal: Soft. Bowel sounds are normal.   Musculoskeletal: Normal range of motion. She exhibits no edema.   Neurological: She is alert and oriented to person, place, and time.   Skin: Skin is warm and dry.   Psychiatric: She has a normal mood and affect.       Significant Labs:  CBC:   Recent Labs  Lab 06/07/17 0356   WBC 12.09   RBC 3.60*   HGB 10.5*   HCT 30.8*      MCV 86   MCH 29.2   MCHC 34.1     CMP:   Recent Labs  Lab 06/07/17 0356   *   CALCIUM 8.5*   ALBUMIN 2.7*   PROT 5.5*   *   K 3.9   CO2 27   CL 96   BUN 8   CREATININE 0.9   ALKPHOS 52*   ALT 65*   AST 36   BILITOT 0.5     Microbiology Results (last 7 days)     Procedure Component Value Units Date/Time    AFB Culture & Smear [[id number]] Collected:  06/05/17 1612    Order Status:  Completed Specimen:  Tissue from Lung, RLL Updated:  06/06/17 2127     AFB Culture & Smear Culture in progress     AFB CULTURE STAIN No acid fast bacilli seen.    Narrative:       RLL    Aerobic culture [[id number]] Collected:  06/05/17 1612    Order Status:  Completed Specimen:  Tissue from Lung, RLL Updated:  06/06/17 0741     Aerobic Bacterial Culture No growth    Narrative:       RLL    Culture, Anaerobe [[id number]] Collected:  06/05/17 1612    Order Status:  Completed Specimen:  Tissue from Lung, RLL Updated:  06/06/17 0737     Anaerobic Culture Culture in progress    Narrative:       RLL    Fungus culture [[id number]] Collected:  06/05/17 1612    Order Status:  Completed Specimen:  Tissue from Lung, RLL Updated:  06/06/17 0732     Fungus (Mycology) Culture Culture in progress    Narrative:       RLL    Gram stain [[id number]] Collected:  06/05/17 1612    Order Status:  Completed Specimen:  Tissue from Lung, RLL Updated:  06/05/17 1739     Gram Stain Result Rare WBC's      No organisms seen    Narrative:       RLL          Significant Diagnostics:  CXR: I have reviewed all pertinent results/findings  within the past 24 hours and my personal findings are:  Basilar chest tube in good position. No acute changes

## 2017-06-07 NOTE — PLAN OF CARE
Problem: Patient Care Overview  Goal: Plan of Care Review  Outcome: Ongoing (interventions implemented as appropriate)  POC reviewed with patient, who verbalized understanding. AAOx4. Remains free of falls and injury. VSS. Upper right incision with telfa and no drainage to site, dressing intact. Right lateral Post-Chest tube removal site, drainage serous drainage, dressing changed with gauze and tega-derm. Chest tube site intact, sutures intact, occlusive gauze dressing changed, Chest tube to water seal, serosanguineous drainage.Tolerating regaular diet. Denies nausea. Moderate pain controlled with PRN medications. Voiding per bathroom/hat. Up ad mike. Telemetry monitor SR.TEDS off and SCDS off, patient asked for a break tonight. No acute events. No distress noted. Family at bedside. Call bell in reach. Will continue to monitor.

## 2017-06-07 NOTE — PROGRESS NOTES
[facility]  General Surgery  Progress Note    Subjective:     History of Present Illness:  She presents for evaluation of a progressive RLL lung mass.  The lesion was seen in February at which time the patient presented with a persistent cough.  A f/u chest CT showed an ~8mm size increase over a 2 month time course.  A navigational bronchoscopy was negative for malignancy.  I reviewed the images which show a nearly 5cm RLL medial basilar lung mass nearly abutting the mediastinal pleural surface.  It contains a focal area of calcification and has irregular borders.    Post-Op Info:  Procedure(s) (LRB):  DISSECTION-LYMPH NODE (Right)  THORACOSCOPY-VIDEO ASSISTED (VATS) (Right)  BIOPSY-LUNG (Right)   2 Days Post-Op     Interval History: NAEON. Pail well controlled. Tolerating diet. Stable on room air. Remaining chest tube draining well.     Medications:  Continuous Infusions:   Scheduled Meds:   enoxaparin  40 mg Subcutaneous Q24H    ketorolac  15 mg Intravenous Q6H    magnesium citrate  296 mL Oral Once    pantoprazole  40 mg Intravenous Daily    polyethylene glycol  17 g Oral Daily     PRN Meds:acetaminophen, albuterol sulfate, calcium carbonate, diphenhydrAMINE, HYDROmorphone, ondansetron, oxycodone-acetaminophen, oxycodone-acetaminophen, promethazine     Review of patient's allergies indicates:   Allergen Reactions    Pcn [penicillins] Hives     Objective:     Vital Signs (Most Recent):  Temp: 98.9 °F (37.2 °C) (06/07/17 0845)  Pulse: 82 (06/07/17 0845)  Resp: 16 (06/07/17 0845)  BP: (!) 144/63 (06/07/17 0845)  SpO2: 96 % (06/07/17 0845) Vital Signs (24h Range):  Temp:  [97.9 °F (36.6 °C)-99.1 °F (37.3 °C)] 98.9 °F (37.2 °C)  Pulse:  [75-95] 82  Resp:  [16-18] 16  SpO2:  [93 %-100 %] 96 %  BP: (119-144)/(58-68) 144/63     Weight: 73 kg (161 lb)  Body mass index is 28.52 kg/m².    Intake/Output - Last 3 Shifts       06/05 0700 - 06/06 0659 06/06 0700 - 06/07 0659 06/07 0700 - 06/08 0659     P.O. 0 3720     I.V. (mL/kg) 4475.2 (61.3) 452.1 (6.2)     Total Intake(mL/kg) 4475.2 (61.3) 4172.1 (57.2)     Urine (mL/kg/hr) 1190 1450 (0.8)     Emesis/NG output  0 (0)     Stool  0 (0)     Blood 300      Chest Tube 351 250 (0.1)     Total Output 1841 1700      Net +2634.2 +2472.1             Stool Occurrence  0 x     Emesis Occurrence  0 x           Physical Exam   Constitutional: She is oriented to person, place, and time. She appears well-developed and well-nourished.   HENT:   Head: Normocephalic and atraumatic.   Eyes: Pupils are equal, round, and reactive to light.   Neck: Normal range of motion. Neck supple.   Cardiovascular: Normal rate and regular rhythm.    Pulmonary/Chest: Effort normal and breath sounds normal. No respiratory distress. She exhibits tenderness.   Abdominal: Soft. Bowel sounds are normal.   Musculoskeletal: Normal range of motion. She exhibits no edema.   Neurological: She is alert and oriented to person, place, and time.   Skin: Skin is warm and dry.   Psychiatric: She has a normal mood and affect.       Significant Labs:  CBC:   Recent Labs  Lab 06/07/17  0356   WBC 12.09   RBC 3.60*   HGB 10.5*   HCT 30.8*      MCV 86   MCH 29.2   MCHC 34.1     CMP:   Recent Labs  Lab 06/07/17  0356   *   CALCIUM 8.5*   ALBUMIN 2.7*   PROT 5.5*   *   K 3.9   CO2 27   CL 96   BUN 8   CREATININE 0.9   ALKPHOS 52*   ALT 65*   AST 36   BILITOT 0.5     Microbiology Results (last 7 days)     Procedure Component Value Units Date/Time    AFB Culture & Smear [[id number]] Collected:  06/05/17 1612    Order Status:  Completed Specimen:  Tissue from Lung, RLL Updated:  06/06/17 2127     AFB Culture & Smear Culture in progress     AFB CULTURE STAIN No acid fast bacilli seen.    Narrative:       RLL    Aerobic culture [[id number]] Collected:  06/05/17 1612    Order Status:  Completed Specimen:  Tissue from Lung, RLL Updated:  06/06/17 0741     Aerobic Bacterial Culture No growth    Narrative:        RLL    Culture, Anaerobe [[id number]] Collected:  06/05/17 1612    Order Status:  Completed Specimen:  Tissue from Lung, RLL Updated:  06/06/17 0737     Anaerobic Culture Culture in progress    Narrative:       RLL    Fungus culture [[id number]] Collected:  06/05/17 1612    Order Status:  Completed Specimen:  Tissue from Lung, RLL Updated:  06/06/17 0732     Fungus (Mycology) Culture Culture in progress    Narrative:       RLL    Gram stain [[id number]] Collected:  06/05/17 1612    Order Status:  Completed Specimen:  Tissue from Lung, RLL Updated:  06/05/17 1739     Gram Stain Result Rare WBC's      No organisms seen    Narrative:       RLL          Significant Diagnostics:  CXR: I have reviewed all pertinent results/findings within the past 24 hours and my personal findings are:  Basilar chest tube in good position. No acute changes    Assessment/Plan:     * Right lower lobe lung mass    57 year old female with nearly 5cm RLL medial basilar lung mass nearly abutting the mediastinal pleural surface, s/p exploratory right VATS, exploratory right thoracotomy, mediastinal lymph node dissection, and intercostal nerve block chest tube insertion.    - Continue regular diet   - HLIV  - PO PRN pain control and Toradol   - daily CXR, continue chest tube to water seal   - Remove remaining chest tube this afternoon   - bowel regimen  - enoxaparin/ pantoprazole ppx              MAREN Santiago  General Surgery  [facility]

## 2017-06-07 NOTE — PLAN OF CARE
Problem: Patient Care Overview  Goal: Plan of Care Review  Outcome: Ongoing (interventions implemented as appropriate)  Care plan reviewed and understood by patient. RR even and unlabored. VSS. Monitored with tele, NSR. Tolerating regular diet with no complaints of N/V. Up ad mike. Ambulated down doll throughout day, and up in chair throughout day. Pt remains free of falls. No acute pain or distress noted. Will continue to monitor.

## 2017-06-07 NOTE — ASSESSMENT & PLAN NOTE
57 year old female with nearly 5cm RLL medial basilar lung mass nearly abutting the mediastinal pleural surface, s/p exploratory right VATS, exploratory right thoracotomy, mediastinal lymph node dissection, and intercostal nerve block chest tube insertion.    - Continue regular diet   - HLIV  - PO PRN pain control and Toradol   - daily CXR, continue chest tube to water seal   - Remove remaining chest tube this afternoon   - bowel regimen  - enoxaparin/ pantoprazole ppx

## 2017-06-08 VITALS
WEIGHT: 161 LBS | BODY MASS INDEX: 28.53 KG/M2 | HEIGHT: 63 IN | TEMPERATURE: 98 F | RESPIRATION RATE: 18 BRPM | HEART RATE: 96 BPM | OXYGEN SATURATION: 96 % | DIASTOLIC BLOOD PRESSURE: 70 MMHG | SYSTOLIC BLOOD PRESSURE: 135 MMHG

## 2017-06-08 DIAGNOSIS — R91.8 LUNG MASS: Primary | ICD-10-CM

## 2017-06-08 LAB
ALBUMIN SERPL BCP-MCNC: 2.5 G/DL
ALP SERPL-CCNC: 53 U/L
ALT SERPL W/O P-5'-P-CCNC: 43 U/L
ANION GAP SERPL CALC-SCNC: 9 MMOL/L
AST SERPL-CCNC: 29 U/L
BACTERIA SPEC AEROBE CULT: NO GROWTH
BASOPHILS # BLD AUTO: 0.02 K/UL
BASOPHILS NFR BLD: 0.2 %
BILIRUB SERPL-MCNC: 0.4 MG/DL
BUN SERPL-MCNC: 7 MG/DL
CALCIUM SERPL-MCNC: 8.3 MG/DL
CHLORIDE SERPL-SCNC: 95 MMOL/L
CO2 SERPL-SCNC: 26 MMOL/L
CREAT SERPL-MCNC: 0.7 MG/DL
DIFFERENTIAL METHOD: ABNORMAL
EOSINOPHIL # BLD AUTO: 0.5 K/UL
EOSINOPHIL NFR BLD: 6.5 %
ERYTHROCYTE [DISTWIDTH] IN BLOOD BY AUTOMATED COUNT: 13 %
EST. GFR  (AFRICAN AMERICAN): >60 ML/MIN/1.73 M^2
EST. GFR  (NON AFRICAN AMERICAN): >60 ML/MIN/1.73 M^2
GLUCOSE SERPL-MCNC: 102 MG/DL
HCT VFR BLD AUTO: 30 %
HGB BLD-MCNC: 10.1 G/DL
LYMPHOCYTES # BLD AUTO: 1.5 K/UL
LYMPHOCYTES NFR BLD: 18 %
MAGNESIUM SERPL-MCNC: 1.9 MG/DL
MCH RBC QN AUTO: 28.4 PG
MCHC RBC AUTO-ENTMCNC: 33.7 %
MCV RBC AUTO: 84 FL
MONOCYTES # BLD AUTO: 0.8 K/UL
MONOCYTES NFR BLD: 9.9 %
NEUTROPHILS # BLD AUTO: 5.3 K/UL
NEUTROPHILS NFR BLD: 64.9 %
PHOSPHATE SERPL-MCNC: 3.1 MG/DL
PLATELET # BLD AUTO: 182 K/UL
PMV BLD AUTO: 9.3 FL
POTASSIUM SERPL-SCNC: 3.4 MMOL/L
PROT SERPL-MCNC: 5.5 G/DL
RBC # BLD AUTO: 3.56 M/UL
SODIUM SERPL-SCNC: 130 MMOL/L
WBC # BLD AUTO: 8.12 K/UL

## 2017-06-08 PROCEDURE — 36415 COLL VENOUS BLD VENIPUNCTURE: CPT

## 2017-06-08 PROCEDURE — 84100 ASSAY OF PHOSPHORUS: CPT

## 2017-06-08 PROCEDURE — 85025 COMPLETE CBC W/AUTO DIFF WBC: CPT

## 2017-06-08 PROCEDURE — 83735 ASSAY OF MAGNESIUM: CPT

## 2017-06-08 PROCEDURE — 25000003 PHARM REV CODE 250: Performed by: STUDENT IN AN ORGANIZED HEALTH CARE EDUCATION/TRAINING PROGRAM

## 2017-06-08 PROCEDURE — 63600175 PHARM REV CODE 636 W HCPCS: Performed by: SURGERY

## 2017-06-08 PROCEDURE — 80053 COMPREHEN METABOLIC PANEL: CPT

## 2017-06-08 RX ORDER — OXYCODONE AND ACETAMINOPHEN 5; 325 MG/1; MG/1
1 TABLET ORAL EVERY 4 HOURS PRN
Qty: 61 TABLET | Refills: 0 | Status: SHIPPED | OUTPATIENT
Start: 2017-06-08 | End: 2017-08-16

## 2017-06-08 RX ADMIN — KETOROLAC TROMETHAMINE 15 MG: 15 INJECTION, SOLUTION INTRAMUSCULAR; INTRAVENOUS at 05:06

## 2017-06-08 RX ADMIN — OXYCODONE HYDROCHLORIDE AND ACETAMINOPHEN 1 TABLET: 10; 325 TABLET ORAL at 08:06

## 2017-06-08 RX ADMIN — DIPHENHYDRAMINE HYDROCHLORIDE 25 MG: 25 CAPSULE ORAL at 04:06

## 2017-06-08 RX ADMIN — OXYCODONE HYDROCHLORIDE AND ACETAMINOPHEN 1 TABLET: 10; 325 TABLET ORAL at 04:06

## 2017-06-08 NOTE — PLAN OF CARE
Problem: Patient Care Overview  Goal: Plan of Care Review  Outcome: Ongoing (interventions implemented as appropriate)  Pt in room a&o x4, sister at bedside, VSS, pt states she is ready to go home, pain mgt. With percocet rt. drsg c/d/i will cont. To monitor pt.

## 2017-06-08 NOTE — SUBJECTIVE & OBJECTIVE
Interval History: NAEON. Pain well controlled. Tolerating diet. (+) BM and flatus. Ambulating without issue. Stable on room air. CXR with small right apical pneumothorax, unchanged overnight.     Medications:  Continuous Infusions:   Scheduled Meds:   enoxaparin  40 mg Subcutaneous Q24H    ketorolac  15 mg Intravenous Q6H    pantoprazole  40 mg Intravenous Daily    polyethylene glycol  17 g Oral Daily     PRN Meds:acetaminophen, albuterol sulfate, calcium carbonate, diphenhydrAMINE, HYDROmorphone, ondansetron, oxycodone-acetaminophen, oxycodone-acetaminophen, promethazine     Review of patient's allergies indicates:   Allergen Reactions    Pcn [penicillins] Hives     Objective:     Vital Signs (Most Recent):  Temp: 97.8 °F (36.6 °C) (06/08/17 0314)  Pulse: 81 (06/08/17 0314)  Resp: 16 (06/08/17 0314)  BP: 127/69 (06/08/17 0314)  SpO2: 97 % (06/08/17 0314) Vital Signs (24h Range):  Temp:  [97.8 °F (36.6 °C)-99 °F (37.2 °C)] 97.8 °F (36.6 °C)  Pulse:  [] 81  Resp:  [16-18] 16  SpO2:  [94 %-99 %] 97 %  BP: (118-144)/(62-77) 127/69     Weight: 73 kg (161 lb)  Body mass index is 28.52 kg/m².    Intake/Output - Last 3 Shifts       06/06 0700 - 06/07 0659 06/07 0700 - 06/08 0659 06/08 0700 - 06/09 0659    P.O. 3720      I.V. (mL/kg) 452.1 (6.2)      Total Intake(mL/kg) 4172.1 (57.2)      Urine (mL/kg/hr) 1875 (1.1) 1200 (0.7)     Emesis/NG output 0 (0)      Stool 0 (0) 0 (0)     Blood       Chest Tube 250 (0.1)      Total Output 2125 1200      Net +2047.1 -1200             Urine Occurrence  3 x     Stool Occurrence 0 x 1 x     Emesis Occurrence 0 x            Physical Exam   Constitutional: She is oriented to person, place, and time. She appears well-developed and well-nourished.   HENT:   Head: Normocephalic and atraumatic.   Eyes: Pupils are equal, round, and reactive to light.   Neck: Normal range of motion. Neck supple.   Cardiovascular: Normal rate and regular rhythm.    Pulmonary/Chest: Effort normal and  breath sounds normal. No respiratory distress. She exhibits no tenderness.   Abdominal: Soft. Bowel sounds are normal.   Musculoskeletal: Normal range of motion. She exhibits no edema.   Neurological: She is alert and oriented to person, place, and time.   Skin: Skin is warm and dry.   Psychiatric: She has a normal mood and affect.     Significant Labs:  CBC:     Recent Labs  Lab 06/08/17  0425   WBC 8.12   RBC 3.56*   HGB 10.1*   HCT 30.0*      MCV 84   MCH 28.4   MCHC 33.7     CMP:     Recent Labs  Lab 06/08/17  0425      CALCIUM 8.3*   ALBUMIN 2.5*   PROT 5.5*   *   K 3.4*   CO2 26   CL 95   BUN 7   CREATININE 0.7   ALKPHOS 53*   ALT 43   AST 29   BILITOT 0.4     Microbiology Results (last 7 days)     Procedure Component Value Units Date/Time    Aerobic culture [[id number]] Collected:  06/05/17 1612    Order Status:  Completed Specimen:  Tissue from Lung, RLL Updated:  06/08/17 0652     Aerobic Bacterial Culture No growth    Narrative:       RLL    Culture, Anaerobe [[id number]] Collected:  06/05/17 1612    Order Status:  Completed Specimen:  Tissue from Lung, RLL Updated:  06/07/17 1052     Anaerobic Culture Culture in progress    Narrative:       RLL    AFB Culture & Smear [[id number]] Collected:  06/05/17 1612    Order Status:  Completed Specimen:  Tissue from Lung, RLL Updated:  06/06/17 2127     AFB Culture & Smear Culture in progress     AFB CULTURE STAIN No acid fast bacilli seen.    Narrative:       RLL    Fungus culture [[id number]] Collected:  06/05/17 1612    Order Status:  Completed Specimen:  Tissue from Lung, RLL Updated:  06/06/17 0732     Fungus (Mycology) Culture Culture in progress    Narrative:       RLL    Gram stain [[id number]] Collected:  06/05/17 1612    Order Status:  Completed Specimen:  Tissue from Lung, RLL Updated:  06/05/17 1739     Gram Stain Result Rare WBC's      No organisms seen    Narrative:       RLL          Significant Diagnostics:  CXR: A tiny right apical  pneumothorax is again identified, but its volume is inconsequential and it has not changed appreciably in size since 6/7/17 at 4:37 PM.  No significant detrimental interval change in the appearance of the chest since that time is observed.

## 2017-06-08 NOTE — DISCHARGE SUMMARY
[facility]  General Surgery  Discharge Summary      Patient Name: Alondra Bryan  MRN: [id number]  Admission Date: 6/5/2017  Hospital Length of Stay: 3 days  Discharge Date and Time:  06/08/2017 7:53 AM  Attending Physician: Lawson Angelo MD   Discharging Provider: MAREN Santiago  Primary Care Provider: Donald Kelly MD    HPI:   She presents for evaluation of a progressive RLL lung mass.  The lesion was seen in February at which time the patient presented with a persistent cough.  A f/u chest CT showed an ~8mm size increase over a 2 month time course.  A navigational bronchoscopy was negative for malignancy.  I reviewed the images which show a nearly 5cm RLL medial basilar lung mass nearly abutting the mediastinal pleural surface.  It contains a focal area of calcification and has irregular borders.    Procedure(s) (LRB):  DISSECTION-LYMPH NODE (Right)  THORACOSCOPY-VIDEO ASSISTED (VATS) (Right)  BIOPSY-LUNG (Right)      Indwelling Lines/Drains at time of discharge:   Lines/Drains/Airways          No matching active lines, drains, or airways        Hospital Course: 6/5/17:   1. Exploratory right VATS.  2. Exploratory right thoracotomy, mediastinal lymph node dissection, intercostal   nerve block chest tube insertion.  6/6/17:Apical chest tube removed. Diet advanced. PO pain control. Ambulating.   6/7/17: Basilar chest tube removed. Pain well controlled. Repeat CXR stable.          Consults:     Significant Diagnostic Studies: Labs:   BMP:   Recent Labs  Lab 06/07/17  0356 06/08/17  0425   * 102   * 130*   K 3.9 3.4*   CL 96 95   CO2 27 26   BUN 8 7   CREATININE 0.9 0.7   CALCIUM 8.5* 8.3*   MG 1.8 1.9   , CMP   Recent Labs  Lab 06/07/17  0356 06/08/17  0425   * 130*   K 3.9 3.4*   CL 96 95   CO2 27 26   * 102   BUN 8 7   CREATININE 0.9 0.7   CALCIUM 8.5* 8.3*   PROT 5.5* 5.5*   ALBUMIN 2.7* 2.5*   BILITOT 0.5 0.4   ALKPHOS 52* 53*   AST 36 29   ALT 65* 43    ANIONGAP 7* 9   ESTGFRAFRICA >60.0 >60.0   EGFRNONAA >60.0 >60.0   , CBC   Recent Labs  Lab 06/07/17  0356 06/08/17  0425   WBC 12.09 8.12   HGB 10.5* 10.1*   HCT 30.8* 30.0*    182    and INR   Lab Results   Component Value Date    INR 1.0 03/06/2017     Radiology: X-Ray: CXR: X-Ray Chest 1 View (CXR):   Results for orders placed or performed during the hospital encounter of 06/05/17   X-Ray Chest 1 View    Narrative    A tiny right apical pneumothorax is again identified, but its volume is inconsequential and it has not changed appreciably in size since 6/7/17 at 4:37 PM.  No significant detrimental interval change in the appearance of the chest since that time is observed.    Impression     As above.      Electronically signed by: Tom Stacy MD  Date:     06/08/17  Time:    07:14     and X-Ray Chest PA and Lateral (CXR): No results found for this visit on 06/05/17.    Pending Diagnostic Studies:     Procedure Component Value Units Date/Time    X-Ray Chest 1 View [[id number]]     Order Status:  Sent Lab Status:  No result     X-Ray Chest 1 View [[id number]]     Order Status:  Sent Lab Status:  No result         Final Active Diagnoses:    Diagnosis Date Noted POA    PRINCIPAL PROBLEM:  Right lower lobe lung mass [R91.8] 04/11/2017 Yes    Lung nodule [R91.1] 06/05/2017 Yes      Problems Resolved During this Admission:    Diagnosis Date Noted Date Resolved POA      Discharged Condition: good    Disposition: Home or Self Care    Follow Up:  Follow-up Information     Lawson Angelo MD.    Specialty:  Cardiothoracic Surgery  Why:  For wound re-check and path review   [facility] information:  [facility] [address] [phone]                 Patient Instructions:     Diet general     Activity as tolerated     Shower on day dressing removed (No bath)     Weight bearing restrictions (specify)   Order Comments: No more than 20 pounds     Call MD for:  temperature >100.4     Call MD for:   persistent nausea and vomiting or diarrhea     Call MD for:  severe uncontrolled pain     Call MD for:  redness, tenderness, or signs of infection (pain, swelling, redness, odor or green/yellow discharge around incision site)     Call MD for:  difficulty breathing or increased cough     Call MD for:  severe persistent headache     Call MD for:  worsening rash     Call MD for:  persistent dizziness, light-headedness, or visual disturbances     Call MD for:  increased confusion or weakness     Remove dressing in 24 hours       Medications:  Reconciled Home Medications:   Current Discharge Medication List      START taking these medications    Details   oxycodone-acetaminophen (PERCOCET) 5-325 mg per tablet Take 1 tablet by mouth every 4 (four) hours as needed for Pain.  Qty: 61 tablet, Refills: 0         CONTINUE these medications which have NOT CHANGED    Details   !! ARMOUR THYROID 15 mg Tab 15 mg every morning.       COMPOUND HORMONE REPLACEMENT Apply topically every evening. biest (50/50) prog/test/dhea 2/5/150/1.5/5mg in each arm       cyanocobalamin 1,000 mcg/mL injection 1,000 mcg every 28 days. Takes 1st of every month      diphenhydrAMINE (BENADRYL) 25 mg capsule Take 50 mg by mouth every evening.       doxycycline (ORACEA) 40 mg capsule Take 40 mg by mouth every other day.       fluticasone (FLONASE ALLERGY RELIEF) 50 mcg/actuation nasal spray 2 sprays by Nasal route once daily.      GUAIFENESIN (TUSSIN COUGH ORAL) Take by mouth nightly as needed.       lisinopril 10 MG tablet TAKE 1 TABLET BY MOUTH ONCE A DAY  Qty: 30 tablet, Refills: 12      meloxicam (MOBIC) 15 MG tablet TAKE 1 TABLET BY MOUTH ONCE A DAY  Qty: 30 tablet, Refills: 12      METHYL-B12/L-MEFOLATE/B6 PHOS (LEVMEFOLATE-B6 PHOS-METHYL-B12 ORAL) Take 1 tablet by mouth every morning.       multivitamin with folic acid (ONE DAILY ESSENTIAL) 400 mcg Tab Take 1 tablet by mouth every morning.       pantoprazole (PROTONIX) 40 MG tablet Take 1 tablet  (40 mg total) by mouth once daily.  Qty: 30 tablet, Refills: 0    Associated Diagnoses: Gastroesophageal reflux disease, esophagitis presence not specified      SOOLANTRA 1 % Crea every other day.       VITAMIN D2 50,000 unit capsule 50,000 Units every 7 days. Takes on Sun  Refills: 0      !! ARMOUR THYROID 60 mg Tab 60 mg every morning.   Refills: 3       !! - Potential duplicate medications found. Please discuss with provider.          Sara Salazar PA-C  Thoracic Surgery  [id number]

## 2017-06-08 NOTE — PROGRESS NOTES
[facility]  General Surgery  Progress Note    Subjective:     History of Present Illness:  She presents for evaluation of a progressive RLL lung mass.  The lesion was seen in February at which time the patient presented with a persistent cough.  A f/u chest CT showed an ~8mm size increase over a 2 month time course.  A navigational bronchoscopy was negative for malignancy.  I reviewed the images which show a nearly 5cm RLL medial basilar lung mass nearly abutting the mediastinal pleural surface.  It contains a focal area of calcification and has irregular borders.    Post-Op Info:  Procedure(s) (LRB):  DISSECTION-LYMPH NODE (Right)  THORACOSCOPY-VIDEO ASSISTED (VATS) (Right)  BIOPSY-LUNG (Right)   3 Days Post-Op     Interval History: NAEON. Pain well controlled. Tolerating diet. (+) BM and flatus. Ambulating without issue. Stable on room air. CXR with small right apical pneumothorax, unchanged overnight.     Medications:  Continuous Infusions:   Scheduled Meds:   enoxaparin  40 mg Subcutaneous Q24H    ketorolac  15 mg Intravenous Q6H    pantoprazole  40 mg Intravenous Daily    polyethylene glycol  17 g Oral Daily     PRN Meds:acetaminophen, albuterol sulfate, calcium carbonate, diphenhydrAMINE, HYDROmorphone, ondansetron, oxycodone-acetaminophen, oxycodone-acetaminophen, promethazine     Review of patient's allergies indicates:   Allergen Reactions    Pcn [penicillins] Hives     Objective:     Vital Signs (Most Recent):  Temp: 97.8 °F (36.6 °C) (06/08/17 0314)  Pulse: 81 (06/08/17 0314)  Resp: 16 (06/08/17 0314)  BP: 127/69 (06/08/17 0314)  SpO2: 97 % (06/08/17 0314) Vital Signs (24h Range):  Temp:  [97.8 °F (36.6 °C)-99 °F (37.2 °C)] 97.8 °F (36.6 °C)  Pulse:  [] 81  Resp:  [16-18] 16  SpO2:  [94 %-99 %] 97 %  BP: (118-144)/(62-77) 127/69     Weight: 73 kg (161 lb)  Body mass index is 28.52 kg/m².    Intake/Output - Last 3 Shifts       06/06 0700 - 06/07 0659 06/07 0700 - 06/08 0659  06/08 0700 - 06/09 0659    P.O. 3720      I.V. (mL/kg) 452.1 (6.2)      Total Intake(mL/kg) 4172.1 (57.2)      Urine (mL/kg/hr) 1875 (1.1) 1200 (0.7)     Emesis/NG output 0 (0)      Stool 0 (0) 0 (0)     Blood       Chest Tube 250 (0.1)      Total Output 2125 1200      Net +2047.1 -1200             Urine Occurrence  3 x     Stool Occurrence 0 x 1 x     Emesis Occurrence 0 x            Physical Exam   Constitutional: She is oriented to person, place, and time. She appears well-developed and well-nourished.   HENT:   Head: Normocephalic and atraumatic.   Eyes: Pupils are equal, round, and reactive to light.   Neck: Normal range of motion. Neck supple.   Cardiovascular: Normal rate and regular rhythm.    Pulmonary/Chest: Effort normal and breath sounds normal. No respiratory distress. She exhibits no tenderness.   Abdominal: Soft. Bowel sounds are normal.   Musculoskeletal: Normal range of motion. She exhibits no edema.   Neurological: She is alert and oriented to person, place, and time.   Skin: Skin is warm and dry.   Psychiatric: She has a normal mood and affect.     Significant Labs:  CBC:     Recent Labs  Lab 06/08/17  0425   WBC 8.12   RBC 3.56*   HGB 10.1*   HCT 30.0*      MCV 84   MCH 28.4   MCHC 33.7     CMP:     Recent Labs  Lab 06/08/17  0425      CALCIUM 8.3*   ALBUMIN 2.5*   PROT 5.5*   *   K 3.4*   CO2 26   CL 95   BUN 7   CREATININE 0.7   ALKPHOS 53*   ALT 43   AST 29   BILITOT 0.4     Microbiology Results (last 7 days)     Procedure Component Value Units Date/Time    Aerobic culture [[id number]] Collected:  06/05/17 1612    Order Status:  Completed Specimen:  Tissue from Lung, RLL Updated:  06/08/17 0652     Aerobic Bacterial Culture No growth    Narrative:       RLL    Culture, Anaerobe [[id number]] Collected:  06/05/17 1612    Order Status:  Completed Specimen:  Tissue from Lung, RLL Updated:  06/07/17 1052     Anaerobic Culture Culture in progress    Narrative:       RLL    AFB  Culture & Smear [[id number]] Collected:  06/05/17 1612    Order Status:  Completed Specimen:  Tissue from Lung, RLL Updated:  06/06/17 2127     AFB Culture & Smear Culture in progress     AFB CULTURE STAIN No acid fast bacilli seen.    Narrative:       RLL    Fungus culture [[id number]] Collected:  06/05/17 1612    Order Status:  Completed Specimen:  Tissue from Lung, RLL Updated:  06/06/17 0732     Fungus (Mycology) Culture Culture in progress    Narrative:       RLL    Gram stain [[id number]] Collected:  06/05/17 1612    Order Status:  Completed Specimen:  Tissue from Lung, RLL Updated:  06/05/17 1739     Gram Stain Result Rare WBC's      No organisms seen    Narrative:       RLL          Significant Diagnostics:  CXR: A tiny right apical pneumothorax is again identified, but its volume is inconsequential and it has not changed appreciably in size since 6/7/17 at 4:37 PM.  No significant detrimental interval change in the appearance of the chest since that time is observed.    Assessment/Plan:     * Right lower lobe lung mass    57 year old female with nearly 5cm RLL medial basilar lung mass nearly abutting the mediastinal pleural surface, s/p exploratory right VATS, exploratory right thoracotomy, mediastinal lymph node dissection, and intercostal nerve block chest tube insertion.    - Continue regular diet   - HLIV  - PO PRN pain control and Toradol   - daily CXR; no change, small ptx  - bowel regimen  - enoxaparin/ pantoprazole ppx  - ready for discharge              Chiquita Bell MD  General Surgery  [facility]

## 2017-06-08 NOTE — NURSING
Pt discharged to home with sister. Discharge instructions given verbally and hard copy provided. Hard copy of Rx given to pt. IV's dc'ed with cath tip intact. Pt remains free of falls. No acute pain or distress noted.

## 2017-06-08 NOTE — ASSESSMENT & PLAN NOTE
57 year old female with nearly 5cm RLL medial basilar lung mass nearly abutting the mediastinal pleural surface, s/p exploratory right VATS, exploratory right thoracotomy, mediastinal lymph node dissection, and intercostal nerve block chest tube insertion.    - Continue regular diet   - HLIV  - PO PRN pain control and Toradol   - daily CXR; no change, small ptx  - bowel regimen  - enoxaparin/ pantoprazole ppx  - ready for discharge

## 2017-06-09 ENCOUNTER — TELEPHONE (OUTPATIENT)
Dept: CARDIOTHORACIC SURGERY | Facility: CLINIC | Age: 57
End: 2017-06-09

## 2017-06-09 LAB
BLD PROD TYP BPU: NORMAL
BLD PROD TYP BPU: NORMAL
BLOOD UNIT EXPIRATION DATE: NORMAL
BLOOD UNIT EXPIRATION DATE: NORMAL
BLOOD UNIT TYPE CODE: 5100
BLOOD UNIT TYPE CODE: 5100
BLOOD UNIT TYPE: NORMAL
BLOOD UNIT TYPE: NORMAL
CODING SYSTEM: NORMAL
CODING SYSTEM: NORMAL
DISPENSE STATUS: NORMAL
DISPENSE STATUS: NORMAL
TRANS ERYTHROCYTES VOL PATIENT: NORMAL ML
TRANS ERYTHROCYTES VOL PATIENT: NORMAL ML

## 2017-06-09 NOTE — TELEPHONE ENCOUNTER
Called to check on the pt after hospital d/c, she is recovering well and denies any needs. Spoke with her , discussed drainage at the chest tube insertion site. They will con't to redress the chest tube area as she does have some drainage throughout the day especially with coughing. Reviewed shower instructions and she can remove dressings while keeping steri strips in place. She is agreeable to f/u on 6/16 with CXR prior. She voices understanding that her appt may need to be moved back if pathology is still pending.  She has our phone # to call with any needs/concerns.

## 2017-06-12 ENCOUNTER — PATIENT OUTREACH (OUTPATIENT)
Dept: ADMINISTRATIVE | Facility: CLINIC | Age: 57
End: 2017-06-12
Payer: COMMERCIAL

## 2017-06-12 LAB — BACTERIA SPEC ANAEROBE CULT: NORMAL

## 2017-06-12 NOTE — PATIENT INSTRUCTIONS
After a Thoracoscopy  After surgery, you will wake up in a recovery area. At first youll likely feel groggy and thirsty. An IV (intravenous) line gives you fluids and medicines to relieve pain. Monitors will keep track of your breathing and heartbeat. You may have a chest tube coming out of your chest to drain fluid or air. This may be taken out just after surgery. Or it may stay in place for several days.    Recovering in the hospital  While in the hospital, youll work with a respiratory therapist. He or she will teach you breathing exercises. These help keep your lungs clear and prevent inflammation. Youll do these exercises every hour or so. Depending on your condition, a nurse or therapist will help you get up and walk soon after your surgery. This keeps your blood moving and will help improve your healing. The hospital stay after your surgery will be 1-4 days. If you have chest tubes, you wont go home until theyre removed.  Recovering at home  At home, follow the instructions you are given about how to care for your incisions and lungs. This may include:  · Take your pain medicines as prescribed. This helps relieve soreness and makes activity and deep breathing easier.  · Walk to keep your blood moving and strengthen your muscles. But avoid strenuous activity, heavy lifting, and driving for a few weeks.  · Continue to do the breathing exercises taught to you by your therapist.  · Resume sexual relations when you feel ready.  · Ask your doctor when you can go back to work.  · Follow up with your doctor. He or she will monitor your healing and discuss the results of the procedure.  When to call your doctor  Call your doctor if you have any of the following symptoms after your procedure:  · Shortness of breath  · Dizziness or lightheadedness that continues even after sitting down  · Very red or draining incision  · Sudden, sharp chest pain that does not go away  · Fever of 100.4°F (38°C) or higher, or as  directed by your healthcare provider  · Coughing up bright red blood    Date Last Reviewed: 10/1/2016  © 2827-0116 [employer]. [address] [place] [address], [place] [address]. All rights reserved. This information is not intended as a substitute for professional medical care. Always follow your healthcare professional's instructions.

## 2017-06-15 NOTE — ADDENDUM NOTE
Addendum  created 06/15/17 1030 by Logan Mercado MD    Anesthesia Intra Blocks edited, Sign clinical note

## 2017-06-16 ENCOUNTER — OFFICE VISIT (OUTPATIENT)
Dept: CARDIOTHORACIC SURGERY | Facility: CLINIC | Age: 57
End: 2017-06-16
Payer: COMMERCIAL

## 2017-06-16 ENCOUNTER — HOSPITAL ENCOUNTER (OUTPATIENT)
Dept: RADIOLOGY | Facility: HOSPITAL | Age: 57
Discharge: HOME | End: 2017-06-16
Attending: THORACIC SURGERY (CARDIOTHORACIC VASCULAR SURGERY)
Payer: COMMERCIAL

## 2017-06-16 VITALS
BODY MASS INDEX: 28 KG/M2 | WEIGHT: 158.06 LBS | HEART RATE: 100 BPM | HEIGHT: 63 IN | DIASTOLIC BLOOD PRESSURE: 82 MMHG | OXYGEN SATURATION: 98 % | SYSTOLIC BLOOD PRESSURE: 131 MMHG

## 2017-06-16 DIAGNOSIS — R91.8 LUNG MASS: ICD-10-CM

## 2017-06-16 DIAGNOSIS — R91.1 LUNG NODULE: Primary | ICD-10-CM

## 2017-06-16 PROCEDURE — 99999 PR PBB SHADOW E&M-EST. PATIENT-LVL III: CPT | Mod: PBBFAC,,, | Performed by: THORACIC SURGERY (CARDIOTHORACIC VASCULAR SURGERY)

## 2017-06-16 PROCEDURE — 71020 XR CHEST PA AND LATERAL: CPT | Mod: 26,,, | Performed by: RADIOLOGY

## 2017-06-16 PROCEDURE — 99024 POSTOP FOLLOW-UP VISIT: CPT | Mod: S$GLB,,, | Performed by: THORACIC SURGERY (CARDIOTHORACIC VASCULAR SURGERY)

## 2017-06-16 PROCEDURE — 71020 XR CHEST PA AND LATERAL: CPT | Mod: TC

## 2017-06-16 NOTE — PROGRESS NOTES
Subjective:       Patient ID: Alondra Bryan is a 57 y.o. female.    Chief Complaint: Post-op Evaluation    Diagnosis:  RLL lung mass with persistent cough    Pre-operative therapy: none    Procedure(s) and date(s): 1. 6/16/17 - Right VATS --> thoracotomy with LN dissection, and RLL tissue biopsy    Pathology: 1. 5th rib excision - soft tissue and bone with multilineage hematopoiesis, negative for tumor; RLL tissue - Nonspecific fibrosis with abundant intra-alveolar macrophages.Negative for carcinoma, lymphoma, or granulomatous disease; LN 7, 10, 11 - negative for malignancy     Post-operative therapy: TBD    HPI   58 yo female with persistent cough and progressive RLL lung mass here today for 2 week f/u for Right VATS--> thoracotomy with lymph node and RLL tissue biopsy on 6/16/17. Uncomplicated post-op course. Currently weaning off percocet. Increasing exercise tolerance but notes mild ZARCO. Occasional numbness in dermatomal pattern extending from thoracotomy incision radiating to anterior breast. Still persistent cough but non-productive. Normal BM. Denies fever, chills, SOB, CP, nausea, vomiting.       Review of Systems   Constitutional: Positive for activity change (increasing ) and fatigue. Negative for chills and fever.   Eyes: Negative for pain.   Respiratory: Positive for cough and shortness of breath (Zarco). Negative for chest tightness.    Cardiovascular: Negative for chest pain and palpitations.   Gastrointestinal: Negative for abdominal pain, nausea and vomiting.   Genitourinary: Negative for difficulty urinating.   Skin: Positive for rash (skin irritation from bandage). Negative for color change.   Neurological: Negative for dizziness and syncope.   Psychiatric/Behavioral: The patient is not nervous/anxious.          Objective:      Physical Exam   Constitutional: She is oriented to person, place, and time. She appears well-developed and well-nourished. No distress.   HENT:   Head: Normocephalic and  atraumatic.   Eyes: EOM are normal. Pupils are equal, round, and reactive to light.   Neck: Normal range of motion. Neck supple. No tracheal deviation present.   Cardiovascular: Normal rate, regular rhythm and normal heart sounds.    Pulmonary/Chest: Effort normal and breath sounds normal. She has no wheezes.   Abdominal: Soft.   Musculoskeletal: Normal range of motion.   Neurological: She is alert and oriented to person, place, and time.   Skin: Skin is warm and dry. She is not diaphoretic.   Right thoracotomy incision with steri strips intact. Removed in clinic. Incision healing well without drainage or erythema.   Inferior incisions healing with scabbing. No drainage or erythema   Psychiatric: She has a normal mood and affect. Thought content normal.   Vitals reviewed.        CXR 6/16/17: Status post right thoracotomy with a persistent opacity in the lower lobe area, as discussed above. Mild pleural thickening along the right lateral chest wall without significant effusion or pneumothorax.     Pathology :   5th rib excision - soft tissue and bone with multilineage hematopoiesis, negative for tumor.  RLL tissue - Nonspecific fibrosis with abundant intra-alveolar macrophages.Negative for carcinoma, lymphoma, or granulomatous disease  LN 7, 10, 11 - negative for malignancy,       Assessment:       56 yo female with persistent cough and progressive RLL lung mass here today for 2 week f/u for Right VATS--> thoracotomy with lymph node and RLL tissue biopsy on 6/16/17. Pathology reviewed with patient. Awaiting final cultures.     Plan:       Will present at multidisciplinary lung tumor board.  RTC in 4 months with repeat CT     ATTENDING ATTESTATION:    I evaluated the patient and I agree with the assessment and plan.  Await micro results and present the patient's case at Lung Conference.

## 2017-07-05 ENCOUNTER — PATIENT MESSAGE (OUTPATIENT)
Dept: CARDIOTHORACIC SURGERY | Facility: CLINIC | Age: 57
End: 2017-07-05

## 2017-07-05 ENCOUNTER — DOCUMENTATION ONLY (OUTPATIENT)
Dept: CARDIOTHORACIC SURGERY | Facility: CLINIC | Age: 57
End: 2017-07-05

## 2017-07-05 NOTE — TELEPHONE ENCOUNTER
Spoke with Mrs. Bryan and her  regarding questions about final test/culture results as well as consensus at multidisciplinary lung tumor board. Informed patient that at this time the cultures remain negative. Patient is aware that if she does not hear from our office than the cultures remained NGTD. Also informed patient that MDC meeting agreed with plan for repeat non contrast chest CT in 4 months to reassess lung lesion during interval. She verbalized understanding of the plan.     MAREN Marques-C  Thoracic Surgery  [id number]

## 2017-07-05 NOTE — PATIENT CARE CONFERENCE
"[facility]      THORACIC MULTIDISCIPLINARY TUMOR BOARD  PATIENT REVIEW FORM  ________________________________________________________________________    CLINIC #: [id number]  DATE: 07/05/2017    TUMOR SITE:   Lung Mass    PRESENTER:   Dr. Angelo    PATIENT SUMMARY:   57 y/o presented with a cough, CXR on 2/8/17 revealed "patchy peribronchial vascular thickening suspected right perihilar, infrahilar and posteriorly on the lateral view."   CT chest performed on 2/14/17 revealed masslike density predominantly in the superior segment of the right lower lobe it also extends into the posterior segment of the right upper lobe.   PET performed on 3/28/17 revealed The mass has grown since the recent CT study and now measures 5.3 x 4.4 x 3.5 cm compared to 5.0 x 4.1 x 3.3 cm previously. The mass is significantly hypermetabolic with a max SUV of 8.5 and a hypermetabolic volume of 23.2 cc.  Navigational bronch performed on 4/11/17 was nondiagnostic for malignancy. CT [place] on 4/11/17 revealed "This measures a little larger in size on the current examination with maximum transverse dimension 4.9 cm compared to 4.1 cm on prior exam".  Right thoracotomy performed on 6/5/17 for a RLL tissue biopsy. Pathology is negative for carcinoma, cultures still pending.    BOARD RECOMMENDATIONS:   Await culture results. Follow up in 4 months with repeat CT chest.     CONSULT NEEDED:     [] Surgery    [] Hem/Onc    [] Rad/Onc    [] Dietary                 [] Social Service    [] Psychology       [] Pulmonology    Clinical Stage: Tumor  Node(s)  Metastasis   Pathologic Stage: Tumor  Node(s)  Metastasis     GROUP STAGE:     [] O    [] 1A    [] IB    [] IIA    [] IIB     [] IIIA     [] IIIB     [] IIIC    [] IV                               [] Local recurrence     [] Regional recurrence     [] Distant recurrence                   [] NSCLC     [] SCLC     Tumor type     Unstageable:      [] Yes     [] No  Metastatic site(s):          " [x] [facility] Treatment Guidelines reviewed and care planned is consistent with guidelines.         (i.e., NCCN, NCI, PD, ACO, AUA, etc.)    PRESENTATION AT CANCER CONFERENCE:         [] Prospective    [] Retrospective     [] Follow-Up          [] Eligible for clinical trial

## 2017-07-06 LAB — FUNGUS SPEC CULT: NORMAL

## 2017-08-07 LAB
ACID FAST MOD KINY STN SPEC: NORMAL
MYCOBACTERIUM SPEC QL CULT: NORMAL

## 2017-08-15 ENCOUNTER — PATIENT MESSAGE (OUTPATIENT)
Dept: CARDIOTHORACIC SURGERY | Facility: CLINIC | Age: 57
End: 2017-08-15

## 2017-10-10 ENCOUNTER — TELEPHONE (OUTPATIENT)
Dept: CARDIOTHORACIC SURGERY | Facility: CLINIC | Age: 57
End: 2017-10-10

## 2017-11-07 ENCOUNTER — HOSPITAL ENCOUNTER (OUTPATIENT)
Dept: RADIOLOGY | Facility: HOSPITAL | Age: 57
Discharge: HOME | End: 2017-11-07
Attending: THORACIC SURGERY (CARDIOTHORACIC VASCULAR SURGERY)
Payer: COMMERCIAL

## 2017-11-07 DIAGNOSIS — R91.1 LUNG NODULE: ICD-10-CM

## 2017-11-07 PROCEDURE — 71250 CT THORAX DX C-: CPT | Mod: TC,PO

## 2017-11-07 PROCEDURE — 71250 CT THORAX DX C-: CPT | Mod: 26,,, | Performed by: RADIOLOGY

## 2017-11-10 ENCOUNTER — PATIENT MESSAGE (OUTPATIENT)
Dept: CARDIOTHORACIC SURGERY | Facility: CLINIC | Age: 57
End: 2017-11-10

## 2017-11-10 ENCOUNTER — OFFICE VISIT (OUTPATIENT)
Dept: CARDIOTHORACIC SURGERY | Facility: CLINIC | Age: 57
End: 2017-11-10
Payer: COMMERCIAL

## 2017-11-10 VITALS
HEART RATE: 68 BPM | HEIGHT: 63 IN | DIASTOLIC BLOOD PRESSURE: 87 MMHG | OXYGEN SATURATION: 100 % | WEIGHT: 155 LBS | BODY MASS INDEX: 27.46 KG/M2 | SYSTOLIC BLOOD PRESSURE: 136 MMHG

## 2017-11-10 DIAGNOSIS — R91.8 LUNG MASS: Primary | ICD-10-CM

## 2017-11-10 PROCEDURE — 99999 PR PBB SHADOW E&M-EST. PATIENT-LVL III: CPT | Mod: PBBFAC,,, | Performed by: THORACIC SURGERY (CARDIOTHORACIC VASCULAR SURGERY)

## 2017-11-10 PROCEDURE — 99213 OFFICE O/P EST LOW 20 MIN: CPT | Mod: S$GLB,,, | Performed by: THORACIC SURGERY (CARDIOTHORACIC VASCULAR SURGERY)

## 2017-11-10 NOTE — PROGRESS NOTES
Subjective:       Patient ID: Alondra Bryan is a 57 y.o. female.    Chief Complaint: Follow-up    Diagnosis:  RLL lung mass with persistent cough     Pre-operative therapy: none     Procedure(s) and date(s): 1. 6/16/17 - Right VATS --> thoracotomy with LN dissection, and RLL tissue biopsy     Pathology: 1. 5th rib excision - soft tissue and bone with multilineage hematopoiesis, negative for tumor; RLL tissue - Nonspecific fibrosis with abundant intra-alveolar macrophages.Negative for carcinoma, lymphoma, or granulomatous disease; LN 7, 10, 11 - negative for malignancy     Post-operative therapy: Surveillance     HPI   58 yo female with persistent cough and progressive RLL lung mass returns s/p Right VATS--> thoracotomy with lymph node and RLL tissue biopsy on 6/16/17. Uncomplicated post-op course. After presenting patient at multidisciplinary conference, it was decided to repeat CT in 4 months. Today she reports occasional numbness in dermatomal pattern extending from thoracotomy incision radiating to anterior breast. Otherwise she has no complaints.     Review of Systems   Constitutional: Negative for activity change and appetite change.   HENT: Negative for congestion, trouble swallowing and voice change.    Eyes: Negative.    Respiratory: Negative for chest tightness, shortness of breath and wheezing.    Cardiovascular: Negative.    Gastrointestinal: Negative.    Endocrine: Negative.    Genitourinary: Negative.    Musculoskeletal: Negative.    Allergic/Immunologic: Negative.    Neurological: Negative.    Hematological: Negative.    Psychiatric/Behavioral: Negative.          Objective:      Physical Exam   Constitutional: She is oriented to person, place, and time. She appears well-developed and well-nourished.   HENT:   Head: Normocephalic and atraumatic.   Mouth/Throat: Oropharynx is clear and moist.   Eyes: Pupils are equal, round, and reactive to light.   Neck: Normal range of motion. Neck supple.    Cardiovascular: Normal rate, regular rhythm and normal heart sounds.    Pulmonary/Chest: Effort normal and breath sounds normal. No respiratory distress. She exhibits no tenderness.   Abdominal: Soft. Bowel sounds are normal. She exhibits no distension. There is no tenderness.   Musculoskeletal: Normal range of motion. She exhibits no edema or tenderness.   Lymphadenopathy:     She has no cervical adenopathy.   Neurological: She is alert and oriented to person, place, and time.   Skin: Skin is warm and dry.   Psychiatric: She has a normal mood and affect.         11/7/17 Chest CT  1. Significant improvement since 4/11/17 with some less contiguous nodular opacity in the right lower lobe with improvement since 4/11/17. This suggests a treated or resolving process. Please clinically correlate and follow for total resolution  2. Left-sided thyroid nodules, thyroid ultrasound may be of use.  3. Stable hepatic hypodensity nonspecific on this examination but likely relating to a cyst. An attempt to better categorize this hypodensity could be made with ultrasound or dose could be followed for long term stability on CT    Assessment:       58 yo female with persistent cough and progressive RLL lung mass here today for 2 week f/u for Right VATS--> thoracotomy with lymph node and RLL tissue biopsy on 6/16/17.     Plan:       Recommend follow up with Pulmonary Medicine on the [place].   No further follow up with thoracic surgery needed at this time.       ATTENDING ATTESTATION:    I evaluated the patient and I agree with the assessment and plan.  The radiographic improvement strongly suggests and inflammatory process.  The patient will follow up with a pulmonologist on the [facility].

## 2017-11-28 ENCOUNTER — PATIENT MESSAGE (OUTPATIENT)
Dept: PULMONOLOGY | Facility: CLINIC | Age: 57
End: 2017-11-28

## 2017-11-28 ENCOUNTER — PATIENT MESSAGE (OUTPATIENT)
Dept: CARDIOTHORACIC SURGERY | Facility: CLINIC | Age: 57
End: 2017-11-28

## 2017-12-12 ENCOUNTER — PATIENT MESSAGE (OUTPATIENT)
Dept: PULMONOLOGY | Facility: CLINIC | Age: 57
End: 2017-12-12

## 2018-01-05 ENCOUNTER — OFFICE VISIT (OUTPATIENT)
Dept: PULMONOLOGY | Facility: CLINIC | Age: 58
End: 2018-01-05
Payer: COMMERCIAL

## 2018-01-05 VITALS
SYSTOLIC BLOOD PRESSURE: 118 MMHG | OXYGEN SATURATION: 99 % | DIASTOLIC BLOOD PRESSURE: 78 MMHG | HEART RATE: 75 BPM | BODY MASS INDEX: 27.18 KG/M2 | HEIGHT: 64 IN | WEIGHT: 159.19 LBS

## 2018-01-05 DIAGNOSIS — J18.9 PNEUMONIA OF RIGHT LOWER LOBE DUE TO INFECTIOUS ORGANISM: Primary | ICD-10-CM

## 2018-01-05 PROCEDURE — 99999 PR PBB SHADOW E&M-EST. PATIENT-LVL III: CPT | Mod: PBBFAC,,, | Performed by: INTERNAL MEDICINE

## 2018-01-05 PROCEDURE — 99214 OFFICE O/P EST MOD 30 MIN: CPT | Mod: S$GLB,,, | Performed by: INTERNAL MEDICINE

## 2018-01-05 RX ORDER — PROGESTERONE 100 MG/1
CAPSULE ORAL
COMMUNITY
Start: 2017-12-29 | End: 2018-08-15

## 2018-01-05 NOTE — PROGRESS NOTES
"Subjective:       Patient ID: Alondra Bryan is a 57 y.o. female.    Chief Complaint: Lung Mass (f/u)    57 year old with a history of a right lower lobe lung mass for which two diagnostic procedures were negative for a diagnosis.  Decision was made for excisional biopsy which was described as difficult due to densely calcified hilum.  Incision was made into the lesion and purulent material was expressed per report.  Patient does report dysphagia and reflux but cough and many of her symptoms have essentially resolved.  She has changed her diet and is feeling better.  No fevers, chills or sweats.      Asthma   Pertinent negatives include no chest pain or fever. Her past medical history is significant for asthma.     Review of Systems   Constitutional: Negative for fever.   Cardiovascular: Negative for chest pain and leg swelling.   Gastrointestinal: Negative for acid reflux.   Psychiatric/Behavioral: Negative for confusion.           Objective:       Vitals:    01/05/18 1148   BP: 118/78   Pulse: 75   SpO2: 99%   Weight: 72.2 kg (159 lb 2.8 oz)   Height: 5' 3.5" (1.613 m)   Answers for HPI/ROS submitted by the patient on 1/4/2018   Asthma  In the past 4 weeks, how much of the time did your asthma keep you from getting as much done at work, school, or at home?: none of the time  During the past 4 weeks, how often have you had shortness of breath?: not at all  During the past 4 weeks, how often did your asthma symptoms (Wheezing, coughing, shortness of breath, chest tightness or pain) wake you up at night or earlier that usual in the morning?: not at all  During the past 4 weeks, how often have you used your rescue inhaler or nebulizer medication (such as albuterol)?: not at all  How would you rate your asthma control during the past 4 weeks?: completely controlled   : 25    Physical Exam   Constitutional: She is oriented to person, place, and time. She appears well-developed and well-nourished.   HENT:   Head: " Normocephalic.   Neck: Normal range of motion. Neck supple.   Cardiovascular: Normal rate and regular rhythm.    Pulmonary/Chest: Normal expansion and hyperinflation. She has no wheezes. She has no rales.   Musculoskeletal: Normal range of motion. She exhibits no edema.   Lymphadenopathy: No supraclavicular adenopathy is present.     She has no cervical adenopathy.   Neurological: She is alert and oriented to person, place, and time.   Skin: Skin is warm and dry.   Psychiatric: She has a normal mood and affect.     Personal Diagnostic Review  CT of chest performed on 11/17/2017 without contrast revealed Resolving RLL infiltrate, smaller than previous.  No flowsheet data found.      Assessment:       1. Pneumonia of right lower lobe due to infectious organism        Outpatient Encounter Prescriptions as of 1/5/2018   Medication Sig Dispense Refill    ARMOUR THYROID 15 mg Tab 15 mg every morning.       ARMOUR THYROID 60 mg Tab 60 mg every morning.   3    COMPOUND HORMONE REPLACEMENT Apply topically every evening. biest (50/50) prog/test/dhea 2/5/150/1.5/5mg in each arm       cyanocobalamin 1,000 mcg/mL injection 1,000 mcg every 28 days. Takes 1st of every month      doxycycline (ORACEA) 40 mg capsule Take 40 mg by mouth every other day.       multivitamin with folic acid (ONE DAILY ESSENTIAL) 400 mcg Tab Take 1 tablet by mouth every morning.       progesterone (PROMETRIUM) 100 MG capsule       SOOLANTRA 1 % Crea every other day.       lisinopril 10 MG tablet TAKE 1 TABLET BY MOUTH ONCE A DAY 30 tablet 11    meloxicam (MOBIC) 15 MG tablet TAKE 1 TABLET BY MOUTH ONCE A DAY (Patient taking differently: TAKE 1 TABLET BY MOUTH ONCE A DAY prn) 30 tablet 12    [DISCONTINUED] diphenhydrAMINE (BENADRYL) 25 mg capsule Take 50 mg by mouth every evening.       [DISCONTINUED] fluticasone (FLONASE ALLERGY RELIEF) 50 mcg/actuation nasal spray 2 sprays by Nasal route once daily.      [DISCONTINUED] guaifenesin-codeine  100-10 mg/5 ml (TUSSI-ORGANIDIN NR)  mg/5 mL syrup Take 5 mLs by mouth every 6 (six) hours as needed for Cough. 240 mL 0     No facility-administered encounter medications on file as of 1/5/2018.      Orders Placed This Encounter   Procedures    CT Chest Without Contrast     Standing Status:   Future     Standing Expiration Date:   1/5/2019     Order Specific Question:   May the Radiologist modify the order per protocol to meet the clinical needs of the patient?     Answer:   Yes     Plan:       RLL opacification perhaps chronic aspiration pneumonia now improved with change in diet vs post obstruction from densely calcified lymph nodes.  As process not completely resolved.  Will follow up with CT in 6 months.  Patient may schedule and I will [facility] and/or call with results.

## 2018-08-07 ENCOUNTER — TELEPHONE (OUTPATIENT)
Dept: OBSTETRICS AND GYNECOLOGY | Facility: CLINIC | Age: 58
End: 2018-08-07

## 2018-08-07 DIAGNOSIS — Z12.31 VISIT FOR SCREENING MAMMOGRAM: Primary | ICD-10-CM

## 2018-08-07 NOTE — TELEPHONE ENCOUNTER
----- Message from Stephanie Joseph sent at 8/7/2018  1:34 PM CDT -----  Contact: self  Pt needing mammo order put in. She can be reached at [phone]

## 2018-08-15 ENCOUNTER — OFFICE VISIT (OUTPATIENT)
Dept: OBSTETRICS AND GYNECOLOGY | Facility: CLINIC | Age: 58
End: 2018-08-15
Attending: OBSTETRICS & GYNECOLOGY
Payer: COMMERCIAL

## 2018-08-15 ENCOUNTER — HOSPITAL ENCOUNTER (OUTPATIENT)
Dept: RADIOLOGY | Facility: HOSPITAL | Age: 58
Discharge: HOME | End: 2018-08-15
Attending: OBSTETRICS & GYNECOLOGY
Payer: COMMERCIAL

## 2018-08-15 ENCOUNTER — TELEPHONE (OUTPATIENT)
Dept: GASTROENTEROLOGY | Facility: CLINIC | Age: 58
End: 2018-08-15

## 2018-08-15 VITALS
DIASTOLIC BLOOD PRESSURE: 56 MMHG | WEIGHT: 156.31 LBS | HEIGHT: 63 IN | SYSTOLIC BLOOD PRESSURE: 112 MMHG | BODY MASS INDEX: 27.7 KG/M2

## 2018-08-15 VITALS — WEIGHT: 159 LBS | BODY MASS INDEX: 28.17 KG/M2 | HEIGHT: 63 IN

## 2018-08-15 DIAGNOSIS — Z12.31 VISIT FOR SCREENING MAMMOGRAM: ICD-10-CM

## 2018-08-15 DIAGNOSIS — Z01.419 WELL FEMALE EXAM WITH ROUTINE GYNECOLOGICAL EXAM: Primary | ICD-10-CM

## 2018-08-15 PROBLEM — L29.9 ITCHING: Status: RESOLVED | Noted: 2017-05-17 | Resolved: 2018-08-15

## 2018-08-15 PROCEDURE — 3078F DIAST BP <80 MM HG: CPT | Mod: CPTII,S$GLB,, | Performed by: OBSTETRICS & GYNECOLOGY

## 2018-08-15 PROCEDURE — 99999 PR PBB SHADOW E&M-EST. PATIENT-LVL III: CPT | Mod: PBBFAC,,, | Performed by: OBSTETRICS & GYNECOLOGY

## 2018-08-15 PROCEDURE — 77063 BREAST TOMOSYNTHESIS BI: CPT | Mod: 26,,, | Performed by: RADIOLOGY

## 2018-08-15 PROCEDURE — 77067 SCR MAMMO BI INCL CAD: CPT | Mod: 26,,, | Performed by: RADIOLOGY

## 2018-08-15 PROCEDURE — 99396 PREV VISIT EST AGE 40-64: CPT | Mod: S$GLB,,, | Performed by: OBSTETRICS & GYNECOLOGY

## 2018-08-15 PROCEDURE — 3074F SYST BP LT 130 MM HG: CPT | Mod: CPTII,S$GLB,, | Performed by: OBSTETRICS & GYNECOLOGY

## 2018-08-15 PROCEDURE — 77063 BREAST TOMOSYNTHESIS BI: CPT | Mod: TC,PO

## 2018-08-15 RX ORDER — PROGESTERONE 100 MG/1
100 CAPSULE ORAL DAILY
Qty: 30 CAPSULE | Refills: 11 | Status: CANCELLED | OUTPATIENT
Start: 2018-08-15

## 2018-08-15 NOTE — PROGRESS NOTES
SUBJECTIVE:   58 y.o. female   for routine gyn exam. No LMP recorded. Patient has had a hysterectomy..  She has no unusual complaints.  Takes hormones (oral progestreone) and injection of testosterone / estradiol by wellness practitioner.         Past Medical History:   Diagnosis Date    Arthritis     Asthma     GERD (gastroesophageal reflux disease)     Herpes simplex without mention of complication     Hypertension     Hypothyroidism      Past Surgical History:   Procedure Laterality Date    BREAST BIOPSY Right 2016    needle bx    BREAST BIOPSY Left 2016    needle bx    CHOLECYSTECTOMY N/A     HYSTERECTOMY      [facility] for fibroids    LUNG SURGERY  2017    removed right lower lobe, unscuccesful, inflammation    SINUS SURGERY       Social History     Socioeconomic History    Marital status: Single     Spouse name: Not on file    Number of children: Not on file    Years of education: Not on file    Highest education level: Not on file   Social Needs    Financial resource strain: Not on file    Food insecurity - worry: Not on file    Food insecurity - inability: Not on file    Transportation needs - medical: Not on file    Transportation needs - non-medical: Not on file   Occupational History    Occupation: [employer]     Employer: [employer]   Tobacco Use    Smoking status: Former Smoker     Packs/day: 1.00     Years: 20.00     Pack years: 20.00     Last attempt to quit: 1984     Years since quittin.6    Smokeless tobacco: Never Used    Tobacco comment: quit 20 yrs ago   Substance and Sexual Activity    Alcohol use: Yes     Alcohol/week: 1.2 oz     Types: 2 Cans of beer per week     Comment: drinks beer 3 times a week , 1-2 beers each time    Drug use: No    Sexual activity: Yes     Partners: Male     Birth control/protection: Surgical, Other-see comments     Comment: Hysterectomy   Other Topics Concern    Not on file   Social History Narrative    Patient is  a [employer] worker.     Family History   Problem Relation Age of Onset    Heart attack Father     Heart disease Father     Hypertension Brother     Lymphoma Sister     Hypertension Sister     Cancer Sister     Hypothyroidism Mother     Colon cancer Maternal Grandfather         dx 80    Breast cancer Neg Hx     Diabetes Neg Hx     Eclampsia Neg Hx     Miscarriages / Stillbirths Neg Hx     Ovarian cancer Neg Hx      labor Neg Hx     Stroke Neg Hx     Anesthesia problems Neg Hx      OB History    Para Term  AB Living   2 1 1   1 1   SAB TAB Ectopic Multiple Live Births           1      # Outcome Date GA Lbr Daniel/2nd Weight Sex Delivery Anes PTL Lv   2 AB            1 Term      Vag-Spont   STACEY            Current Outpatient Medications   Medication Sig Dispense Refill    ARMOUR THYROID 60 mg Tab 60 mg every morning.   3    cyanocobalamin 1,000 mcg/mL injection 1,000 mcg every 28 days. Takes 1st of every month      doxycycline (ORACEA) 40 mg capsule Take 40 mg by mouth every other day.       lisinopril 10 MG tablet TAKE 1 TABLET BY MOUTH ONCE A DAY 30 tablet 11    meloxicam (MOBIC) 15 MG tablet TAKE 1 TABLET BY MOUTH ONCE A DAY 30 tablet 13    multivitamin with folic acid (ONE DAILY ESSENTIAL) 400 mcg Tab Take 1 tablet by mouth every morning.       SOOLANTRA 1 % Crea every other day.        No current facility-administered medications for this visit.      Allergies: Pcn [penicillins]     ROS:  Constitutional: no weight loss, weight gain, fever, fatigue  Eyes:  No vision changes, glasses/contacts  ENT/Mouth: No ulcers, sinus problems, ears ringing, headache  Cardiovascular: No inability to lie flat, chest pain, exercise intolerance, swelling, heart palpitations  Respiratory: No wheezing, coughing blood, shortness of breath, or cough  Gastrointestinal: No diarrhea, bloody stool, nausea/vomiting, constipation, gas, hemorrhoids  Genitourinary: No blood in urine, painful urination, urgency  "of urination, frequency of urination, incomplete emptying, incontinence, abnormal bleeding, painful periods, heavy periods, vaginal discharge, vaginal odor, painful intercourse, sexual problems, bleeding after intercourse.  Musculoskeletal: No muscle weakness  Skin/Breast: No painful breasts, nipple discharge, masses, rash, ulcers  Neurological: No passing out, seizures, numbness, headache  Endocrine: No diabetes, hypothyroid, hyperthyroid, hot flashes, hair loss, abnormal hair growth, ance  Psychiatric: No depression, crying  Hematologic: No bruises, bleeding, swollen lymph nodes, anemia.      OBJECTIVE:   The patient appears well, alert, oriented x 3, in no distress.  BP (!) 112/56   Ht 5' 3" (1.6 m)   Wt 70.9 kg (156 lb 4.9 oz)   BMI 27.69 kg/m²   NECK: no thyromegaly, trachea midline  SKIN: no acne, striae, hirsutism  CHEST: CTAB  CV: RRR  BREAST EXAM: breasts appear normal, no suspicious masses, no skin or nipple changes or axillary nodes  ABDOMEN: no hernias, masses, or hepatosplenomegaly  GENITALIA: normal external genitalia, no erythema, no discharge  URETHRA: normal urethra, normal urethral meatus  VAGINA: Normal  CERVIX: absent  UTERUS: absent  ADNEXA: no mass, fullness, tenderness      ASSESSMENT:   1. Well female exam with routine gynecological exam         PLAN:      Return to clinic in 1 year  "

## 2018-08-15 NOTE — TELEPHONE ENCOUNTER
----- Message from Sam Mckeon MD sent at 8/14/2018  4:04 PM CDT -----  Thanks Henry. My nurse will contact pt tomorrow to schedule C-scope.  ----- Message -----  From: Henry Holloway MD  Sent: 8/14/2018   2:28 PM  To: Sam Mckeon MD    Family history of colon cancer and hyperplastic polyps last colonoscopy in 2010.    Thank you Noble!    Henry    ----- Message -----  From: Sam Mckeon MD  Sent: 8/14/2018   7:10 AM  To: Henry Holloway MD    No problem. Routine screening or hx of polyps/Fhx CA?  ----- Message -----  From: Henry Holloway MD  Sent: 8/13/2018   6:15 PM  To: Sam Mckeon MD    Noble,     My mother in law is due for a colonoscopy follow up. Can she have it done there with you? She lives in [place].     I appreciate you!    Henry

## 2018-08-15 NOTE — TELEPHONE ENCOUNTER
Called pt to schedule. She did not have her calender with her. She said she would need to call me back tomorrow morning.

## 2018-08-16 ENCOUNTER — TELEPHONE (OUTPATIENT)
Dept: GASTROENTEROLOGY | Facility: CLINIC | Age: 58
End: 2018-08-16

## 2018-08-16 NOTE — TELEPHONE ENCOUNTER
Pt has been scheduled for colon on 10/12. Reviewed mg citrate prep. Pt is aware I will be mailing instructions and confirmation letter.

## 2018-08-16 NOTE — TELEPHONE ENCOUNTER
----- Message from Narcisa Holden sent at 8/16/2018 10:14 AM CDT -----  Contact: patient  Type:  Patient Returning Call    Who Called:  patient  Who Left Message for Patient:  Annie  Does the patient know what this is regarding?:  yes  Best Call Back Number:  [phone]  Additional Information:  Requesting a call back to schedule procedure

## 2018-08-21 ENCOUNTER — HOSPITAL ENCOUNTER (OUTPATIENT)
Dept: RADIOLOGY | Facility: HOSPITAL | Age: 58
Discharge: HOME | End: 2018-08-21
Attending: INTERNAL MEDICINE
Payer: COMMERCIAL

## 2018-08-21 DIAGNOSIS — J18.9 PNEUMONIA OF RIGHT LOWER LOBE DUE TO INFECTIOUS ORGANISM: ICD-10-CM

## 2018-08-21 PROCEDURE — 71250 CT THORAX DX C-: CPT | Mod: TC,PO

## 2018-08-21 PROCEDURE — 71250 CT THORAX DX C-: CPT | Mod: 26,,, | Performed by: RADIOLOGY

## 2018-09-05 ENCOUNTER — PATIENT MESSAGE (OUTPATIENT)
Dept: PULMONOLOGY | Facility: CLINIC | Age: 58
End: 2018-09-05

## 2018-09-10 ENCOUNTER — PATIENT MESSAGE (OUTPATIENT)
Dept: PULMONOLOGY | Facility: CLINIC | Age: 58
End: 2018-09-10

## 2018-09-10 ENCOUNTER — PATIENT MESSAGE (OUTPATIENT)
Dept: ENDOSCOPY | Facility: HOSPITAL | Age: 58
End: 2018-09-10

## 2018-09-10 DIAGNOSIS — K21.9 GASTROESOPHAGEAL REFLUX DISEASE, ESOPHAGITIS PRESENCE NOT SPECIFIED: ICD-10-CM

## 2018-09-10 DIAGNOSIS — E04.1 THYROID NODULE: ICD-10-CM

## 2018-09-10 DIAGNOSIS — R91.8 RIGHT LOWER LOBE LUNG MASS: ICD-10-CM

## 2018-09-10 DIAGNOSIS — D17.71 RENAL ANGIOMYOLIPOMA: ICD-10-CM

## 2018-09-10 NOTE — TELEPHONE ENCOUNTER
CT of the chest reviewed.  Persistent RLL nodule that has no malignancy after three biopsies.  Only focal fibrotic changes.  Patient has daily reflux type symptoms for which she takes OTC Tums.  Has never addressed this.  Overall, the mass is stable from November but improved since images prior to November.  Recommendations are as follows  1.  Severe reflux symptoms with chronic RLL infiltrate that waxes and wanes-follow up with GI for reflux evaluation (pateint states has a colonoscopy scheduled in October  2.  Thyroid u/s per PCP per nodule  3.  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    May follow up with me on a yearly basis.

## 2018-10-06 ENCOUNTER — PATIENT MESSAGE (OUTPATIENT)
Dept: ENDOSCOPY | Facility: HOSPITAL | Age: 58
End: 2018-10-06

## 2018-10-10 RX ORDER — PROGESTERONE 100 MG/1
100 CAPSULE ORAL DAILY
COMMUNITY

## 2018-10-12 ENCOUNTER — ANESTHESIA (OUTPATIENT)
Dept: ENDOSCOPY | Facility: HOSPITAL | Age: 58
End: 2018-10-12
Payer: COMMERCIAL

## 2018-10-12 ENCOUNTER — ANESTHESIA EVENT (OUTPATIENT)
Dept: ENDOSCOPY | Facility: HOSPITAL | Age: 58
End: 2018-10-12
Payer: COMMERCIAL

## 2018-10-12 ENCOUNTER — HOSPITAL ENCOUNTER (OUTPATIENT)
Facility: HOSPITAL | Age: 58
Discharge: HOME | End: 2018-10-12
Attending: INTERNAL MEDICINE | Admitting: INTERNAL MEDICINE
Payer: COMMERCIAL

## 2018-10-12 VITALS
TEMPERATURE: 98 F | DIASTOLIC BLOOD PRESSURE: 55 MMHG | SYSTOLIC BLOOD PRESSURE: 123 MMHG | WEIGHT: 155 LBS | RESPIRATION RATE: 15 BRPM | HEIGHT: 64 IN | BODY MASS INDEX: 26.46 KG/M2 | OXYGEN SATURATION: 100 % | HEART RATE: 69 BPM

## 2018-10-12 DIAGNOSIS — Z86.010 HX OF COLONIC POLYPS: ICD-10-CM

## 2018-10-12 PROBLEM — Z86.0100 HX OF COLONIC POLYPS: Status: ACTIVE | Noted: 2018-10-12

## 2018-10-12 PROCEDURE — D9220A PRA ANESTHESIA: Mod: 33,CRNA,, | Performed by: NURSE ANESTHETIST, CERTIFIED REGISTERED

## 2018-10-12 PROCEDURE — 37000009 HC ANESTHESIA EA ADD 15 MINS: Mod: PO | Performed by: INTERNAL MEDICINE

## 2018-10-12 PROCEDURE — D9220A PRA ANESTHESIA: Mod: 33,ANES,, | Performed by: ANESTHESIOLOGY

## 2018-10-12 PROCEDURE — 37000008 HC ANESTHESIA 1ST 15 MINUTES: Mod: PO | Performed by: INTERNAL MEDICINE

## 2018-10-12 PROCEDURE — 45380 COLONOSCOPY AND BIOPSY: CPT | Mod: 33,,, | Performed by: INTERNAL MEDICINE

## 2018-10-12 PROCEDURE — 25000003 PHARM REV CODE 250: Mod: PO | Performed by: INTERNAL MEDICINE

## 2018-10-12 PROCEDURE — 45380 COLONOSCOPY AND BIOPSY: CPT | Mod: PO | Performed by: INTERNAL MEDICINE

## 2018-10-12 PROCEDURE — 27201012 HC FORCEPS, HOT/COLD, DISP: Mod: PO | Performed by: INTERNAL MEDICINE

## 2018-10-12 PROCEDURE — 88305 TISSUE EXAM BY PATHOLOGIST: CPT | Performed by: PATHOLOGY

## 2018-10-12 PROCEDURE — 88305 TISSUE EXAM BY PATHOLOGIST: CPT | Mod: 26,,, | Performed by: PATHOLOGY

## 2018-10-12 RX ORDER — SODIUM CHLORIDE 0.9 % (FLUSH) 0.9 %
3 SYRINGE (ML) INJECTION
Status: DISCONTINUED | OUTPATIENT
Start: 2018-10-12 | End: 2018-10-12 | Stop reason: HOSPADM

## 2018-10-12 RX ORDER — SODIUM CHLORIDE, SODIUM LACTATE, POTASSIUM CHLORIDE, CALCIUM CHLORIDE 600; 310; 30; 20 MG/100ML; MG/100ML; MG/100ML; MG/100ML
INJECTION, SOLUTION INTRAVENOUS CONTINUOUS
Status: DISCONTINUED | OUTPATIENT
Start: 2018-10-13 | End: 2018-10-12 | Stop reason: HOSPADM

## 2018-10-12 RX ADMIN — SODIUM CHLORIDE, SODIUM LACTATE, POTASSIUM CHLORIDE, AND CALCIUM CHLORIDE: .6; .31; .03; .02 INJECTION, SOLUTION INTRAVENOUS at 08:10

## 2018-10-12 NOTE — DISCHARGE INSTRUCTIONS
Recovery After Procedural Sedation (Adult)  You have been given medicine by vein to make you sleep during your surgery. This may have included both a pain medicine and sleeping medicine. Most of the effects have worn off. But you may still have some drowsiness for the next 6 to 8 hours.  Home care  Follow these guidelines when you get home:  · For the next 8 hours, you should be watched by a responsible adult. This person should make sure your condition is not getting worse.  · Don't drink any alcohol for the next 24 hours.  · Don't drive, operate dangerous machinery, or make important business or personal decisions during the next 24 hours.  Note: Your healthcare provider may tell you not to take any medicine by mouth for pain or sleep in the next 4 hours. These medicines may react with the medicines you were given in the hospital. This could cause a much stronger response than usual.  Follow-up care  Follow up with your healthcare provider if you are not alert and back to your usual level of activity within 12 hours.  When to seek medical advice  Call your healthcare provider right away if any of these occur:  · Drowsiness gets worse  · Weakness or dizziness gets worse  · Repeated vomiting  · You can't be awakened   Date Last Reviewed: 10/18/2016  © 6250-6273 [email or web address]. [address] [place] [address], [place] [address]. All rights reserved. This information is not intended as a substitute for professional medical care. Always follow your healthcare professional's instructions.

## 2018-10-12 NOTE — TRANSFER OF CARE
"Anesthesia Transfer of Care Note    Patient: Alondra Bryan    Procedure(s) Performed: Procedure(s) (LRB):  COLONOSCOPY (N/A)    Patient location: PACU    Anesthesia Type: general    Transport from OR: Transported from OR on room air with adequate spontaneous ventilation    Post pain: adequate analgesia    Post assessment: no apparent anesthetic complications    Post vital signs: stable    Level of consciousness: awake and sedated    Nausea/Vomiting: no nausea/vomiting    Complications: none    Transfer of care protocol was followed      Last vitals:   Visit Vitals  BP (!) 138/51 (BP Location: Right arm, Patient Position: Lying)   Pulse (!) 59   Temp 36.4 °C (97.5 °F) (Tympanic)   Resp 17   Ht 5' 4" (1.626 m)   Wt 70.3 kg (155 lb)   SpO2 99%   Breastfeeding? No   BMI 26.61 kg/m²     "

## 2018-10-12 NOTE — H&P
History & Physical - Short Stay  Gastroenterology      SUBJECTIVE:     Procedure: Colonoscopy    Chief Complaint/Indication for Procedure: Previous Polyps    PTA Medications   Medication Sig    ARMOUR THYROID 60 mg Tab 60 mg every morning.     cyanocobalamin 1,000 mcg/mL injection 1,000 mcg every 28 days. Takes 1st of every month    doxycycline (ORACEA) 40 mg capsule Take 40 mg by mouth every other day.     lisinopril 10 MG tablet TAKE 1 TABLET BY MOUTH ONCE A DAY    meloxicam (MOBIC) 15 MG tablet TAKE 1 TABLET BY MOUTH ONCE A DAY    multivitamin with folic acid (ONE DAILY ESSENTIAL) 400 mcg Tab Take 1 tablet by mouth every morning.     progesterone (PROMETRIUM) 100 MG capsule Take 100 mg by mouth once daily.    SOOLANTRA 1 % Crea every other day.        Review of patient's allergies indicates:   Allergen Reactions    Pcn [penicillins] Hives        Past Medical History:   Diagnosis Date    Arthritis     Asthma     GERD (gastroesophageal reflux disease)     Herpes simplex without mention of complication     Hypertension     Hypothyroidism      Past Surgical History:   Procedure Laterality Date    YLHYRH-FMBOVI-YJ N/A 3/6/2017    [facility]    BIOPSY-LUNG Right 6/5/2017    Performed by [facility] at [facility] OR [address] [place] [address]    BREAST BIOPSY Right 2016    needle bx    BREAST BIOPSY Left 2016    needle bx    BRONCHOSCOPY N/A 4/11/2017    Performed by Garima Llanos MD at [facility] OR [address] [place] [address]    CHOLECYSTECTOMY N/A     DISSECTION-LYMPH NODE Right 6/5/2017    Performed by Lawson [facility]o MD at [facility] OR [address] [place] [address]    ENDOBRONCHIAL NAVIGATION N/A 4/11/2017    Performed by Gar[facility]lanos MD at [facility] OR [address] [place] [address]    ENDOBRONCHIAL ULTRASOUND (EBUS) N/A 4/11/2017    Performed by Garima Llanos MD at [facility] OR [address] [place] [address]    HYST[facility]CT[facility]Y  [facility]    [facility]ro[facility]    LUNG SURGERY  06/2017    removed right lower lobe, unscuccesful, inflammation    SINUS SURGERY       THORACOSCOPY-VIDEO ASSISTED (VATS) Right 2017    Performed by [facility] at [facility] OR [address] [place] [address]     Family History   Problem Relation Age of Onset    Heart attack Father     Heart disease Father     Hypertension Brother     Lymphoma Sister     Hypertension Sister     Cancer Sister     Hypothyroidism Mother     Colon cancer Maternal Grandfather         dx 80    Breast cancer Neg Hx     Diabetes Neg Hx     Eclampsia Neg Hx     Miscarriages / Stillbirths Neg Hx     Ovarian cancer Neg Hx      labor Neg Hx     Stroke Neg Hx     Anesthesia problems Neg Hx      Social History     Tobacco Use    Smoking status: Former Smoker     Packs/day: 1.00     Years: 20.00     Pack years: 20.00     Last attempt to quit: 1984     Years since quittin.8    Smokeless tobacco: Never Used    Tobacco comment: quit 20 yrs ago   Substance Use Topics    Alcohol use: Yes     Alcohol/week: 1.2 oz     Types: 2 Cans of beer per week     Comment: drinks beer 3 times a week , 1-2 beers each time    Drug use: No         OBJECTIVE:     Vital Signs (Most Recent)  Temp: 97.5 °F (36.4 °C) (10/12/18 0805)  Pulse: (!) 59 (10/12/18 0805)  Resp: 17 (10/12/18 0805)  BP: (!) 138/51 (10/12/18 0805)  SpO2: 99 % (10/12/18 0805)    Physical Exam:                                                       GENERAL:  Comfortable, in no acute distress.                                 HEENT EXAM:  Nonicteric.  No adenopathy.  Oropharynx is clear.               NECK:  Supple.                                                               LUNGS:  Clear.                                                               CARDIAC:  Regular rate and rhythm.  S1, S2.  No murmur.                      ABDOMEN:  Soft, positive bowel sounds, nontender.  No hepatosplenomegaly or masses.  No rebound or guarding.                                             EXTREMITIES:  No edema.     MENTAL STATUS:  Normal, alert and  oriented.      ASSESSMENT/PLAN:     Assessment: Previous Polyps    Plan: Colonoscopy    Anesthesia Plan: General    ASA Grade: ASA 2 - Patient with mild systemic disease with no functional limitations    MALLAMPATI SCORE:  I (soft palate, uvula, fauces, and tonsillar pillars visible)

## 2018-10-12 NOTE — DISCHARGE SUMMARY
Discharge Note  Short Stay      SUMMARY     Admit Date: 10/12/2018    Attending Physician: Sam Mckeon MD     Discharge Physician: Sam Mckeon MD    Discharge Date: 10/12/2018 9:17 AM    Final Diagnosis: Routine adult health maintenance [Z00.00]  Family history of colon cancer [Z80.0]  History of colon polyps [Z86.010]    Disposition: HOME OR SELF CARE    Patient Instructions:   Current Discharge Medication List      CONTINUE these medications which have NOT CHANGED    Details   ARMOUR THYROID 60 mg Tab 60 mg every morning.   Refills: 3      cyanocobalamin 1,000 mcg/mL injection 1,000 mcg every 28 days. Takes 1st of every month      doxycycline (ORACEA) 40 mg capsule Take 40 mg by mouth every other day.       lisinopril 10 MG tablet TAKE 1 TABLET BY MOUTH ONCE A DAY  Qty: 30 tablet, Refills: 11      meloxicam (MOBIC) 15 MG tablet TAKE 1 TABLET BY MOUTH ONCE A DAY  Qty: 30 tablet, Refills: 13      multivitamin with folic acid (ONE DAILY ESSENTIAL) 400 mcg Tab Take 1 tablet by mouth every morning.       progesterone (PROMETRIUM) 100 MG capsule Take 100 mg by mouth once daily.      SOOLANTRA 1 % Crea every other day.              Discharge Procedure Orders (must include Diet, Follow-up, Activity)    Follow Up:  Follow up with PCP as previously scheduled  Resume routine diet.  Activity as tolerated.    No driving day of procedure.

## 2018-10-12 NOTE — [employer] PATIENT INSTRUCTIONS
Discharge Summary/Instructions after an Endoscopic Procedure  Patient Name: Alondra Bryan  Patient MRN: [id number]  Patient YOB: 1960     Friday, October 12, 2018  Sam Mckeon MD  RESTRICTIONS:  During your procedure today, you received medications for sedation.  These   medications may affect your judgment, balance and coordination.  Therefore,   for 24 hours, you have the following restrictions:   - DO NOT drive a car, operate machinery, make legal/financial decisions,   sign important papers or drink alcohol.    ACTIVITY:  Today: no heavy lifting, straining or running due to procedural   sedation/anesthesia.  The following day: return to full activity including work.  DIET:  Eat and drink normally unless instructed otherwise.     TREATMENT FOR COMMON SIDE EFFECTS:  - Mild abdominal pain, nausea, belching, bloating or excessive gas:  rest,   eat lightly and use a heating pad.  - Sore Throat: treat with throat lozenges and/or gargle with warm salt   water.  - Because air was used during the procedure, expelling large amounts of air   from your rectum or belching is normal.  - If a bowel prep was taken, you may not have a bowel movement for 1-3 days.    This is normal.  SYMPTOMS TO WATCH FOR AND REPORT TO YOUR PHYSICIAN:  1. Abdominal pain or bloating, other than gas cramps.  2. Chest pain.  3. Back pain.  4. Signs of infection such as: chills or fever occurring within 24 hours   after the procedure.  5. Rectal bleeding, which would show as bright red, maroon, or black stools.   (A tablespoon of blood from the rectum is not serious, especially if   hemorrhoids are present.)  6. Vomiting.  7. Weakness or dizziness.  GO DIRECTLY TO THE NEAREST EMERGENCY ROOM IF YOU HAVE ANY OF THE FOLLOWING:      Difficulty breathing              Chills and/or fever over 101 F   Persistent vomiting and/or vomiting blood   Severe abdominal pain   Severe chest pain   Black, tarry stools   Bleeding- more than one  tablespoon   Any other symptom or condition that you feel may need urgent attention  Your doctor recommends these additional instructions:  If any biopsies were taken, your doctors clinic will contact you in 1 to 2   weeks with any results.  We are waiting for your pathology results.   Your physician has recommended a repeat colonoscopy in five years for   surveillance based on pathology results.   You are being discharged to home.  For questions, problems or results please call your physician - Sam Mckeon MD at Work: [phone].  EMERGENCY PHONE NUMBER: [phone], LAB RESULTS: [phone]  IF A COMPLICATION OR EMERGENCY SITUATION ARISES AND YOU ARE UNABLE TO REACH   YOUR PHYSICIAN - GO DIRECTLY TO THE EMERGENCY ROOM.  ___________________________________________  Nurse Signature  ___________________________________________  Patient/Designated Responsible Party Signature  Sam Mckeon MD  10/12/2018 9:17:14 AM  This report has been verified and signed electronically.  [employer]

## 2018-10-12 NOTE — ANESTHESIA PREPROCEDURE EVALUATION
10/12/2018  Alondra Bryan is a 58 y.o., female.    Anesthesia Evaluation    I have reviewed the Patient Summary Reports.    I have reviewed the Nursing Notes.   I have reviewed the Medications.     Review of Systems  Anesthesia Hx:  Denies Family Hx of Anesthesia complications.  Personal Hx of Anesthesia complications, Post-Operative Nausea/Vomiting, in the past, but not with recent anesthetics / prophylaxis   Cardiovascular:   Hypertension    Pulmonary:   Asthma    Hepatic/GI:   Bowel Prep. GERD    Endocrine:   Hypothyroidism        Physical Exam  General:  Obesity    Airway/Jaw/Neck:  Airway Findings: Mouth Opening: Normal Tongue: Normal  General Airway Assessment: Adult  Mallampati: II  TM Distance: Normal, at least 6 cm         Dental:  DENTAL FINDINGS: Normal   Chest/Lungs:  Chest/Lungs Clear    Heart/Vascular:  Heart Findings: Normal            Anesthesia Plan  Type of Anesthesia, risks & benefits discussed:  Anesthesia Type:  general  Patient's Preference:   Intra-op Monitoring Plan: standard ASA monitors  Intra-op Monitoring Plan Comments:   Post Op Pain Control Plan:   Post Op Pain Control Plan Comments:   Induction:   IV  Beta Blocker:  Patient is not currently on a Beta-Blocker (No further documentation required).       Informed Consent: Patient understands risks and agrees with Anesthesia plan.  Questions answered. Anesthesia consent signed with patient.  ASA Score: 2     Day of Surgery Review of History & Physical:    H&P update referred to the provider.         Ready For Surgery From Anesthesia Perspective.

## 2018-10-12 NOTE — ANESTHESIA POSTPROCEDURE EVALUATION
"Anesthesia Post Evaluation    Patient: Alondra Bryan    Procedure(s) Performed: Procedure(s) (LRB):  COLONOSCOPY (N/A)    Final Anesthesia Type: general  Patient location during evaluation: PACU  Patient participation: Yes- Able to Participate  Level of consciousness: awake and alert  Post-procedure vital signs: reviewed and stable  Pain management: adequate  Airway patency: patent  PONV status at discharge: No PONV  Anesthetic complications: no      Cardiovascular status: blood pressure returned to baseline  Respiratory status: unassisted  Hydration status: euvolemic  Follow-up not needed.        Visit Vitals  BP (!) 123/55 (BP Location: Left arm, Patient Position: Lying)   Pulse 69   Temp 36.4 °C (97.5 °F) (Skin)   Resp 15   Ht 5' 4" (1.626 m)   Wt 70.3 kg (155 lb)   SpO2 100%   Breastfeeding? No   BMI 26.61 kg/m²       Pain/Taylor Score: Pain Assessment Performed: Yes (10/12/2018  9:32 AM)  Presence of Pain: denies (10/12/2018  9:32 AM)  Taylor Score: 10 (10/12/2018  9:32 AM)        "

## 2019-05-31 ENCOUNTER — OFFICE VISIT (OUTPATIENT)
Dept: GASTROENTEROLOGY | Facility: CLINIC | Age: 59
End: 2019-05-31
Payer: COMMERCIAL

## 2019-05-31 VITALS
BODY MASS INDEX: 27.96 KG/M2 | RESPIRATION RATE: 17 BRPM | SYSTOLIC BLOOD PRESSURE: 127 MMHG | HEIGHT: 64 IN | HEART RATE: 69 BPM | WEIGHT: 163.81 LBS | DIASTOLIC BLOOD PRESSURE: 75 MMHG

## 2019-05-31 DIAGNOSIS — R05.9 COUGH: ICD-10-CM

## 2019-05-31 DIAGNOSIS — R13.10 PILL DYSPHAGIA: ICD-10-CM

## 2019-05-31 DIAGNOSIS — R91.8 LUNG MASS: ICD-10-CM

## 2019-05-31 DIAGNOSIS — R10.13 EPIGASTRIC PAIN: ICD-10-CM

## 2019-05-31 DIAGNOSIS — R12 HEARTBURN: Primary | ICD-10-CM

## 2019-05-31 PROCEDURE — 99999 PR PBB SHADOW E&M-EST. PATIENT-LVL IV: CPT | Mod: PBBFAC,,, | Performed by: NURSE PRACTITIONER

## 2019-05-31 PROCEDURE — 3008F PR BODY MASS INDEX (BMI) DOCUMENTED: ICD-10-PCS | Mod: CPTII,S$GLB,, | Performed by: NURSE PRACTITIONER

## 2019-05-31 PROCEDURE — 3074F SYST BP LT 130 MM HG: CPT | Mod: CPTII,S$GLB,, | Performed by: NURSE PRACTITIONER

## 2019-05-31 PROCEDURE — 99214 PR OFFICE/OUTPT VISIT, EST, LEVL IV, 30-39 MIN: ICD-10-PCS | Mod: S$GLB,,, | Performed by: NURSE PRACTITIONER

## 2019-05-31 PROCEDURE — 3074F PR MOST RECENT SYSTOLIC BLOOD PRESSURE < 130 MM HG: ICD-10-PCS | Mod: CPTII,S$GLB,, | Performed by: NURSE PRACTITIONER

## 2019-05-31 PROCEDURE — 3078F DIAST BP <80 MM HG: CPT | Mod: CPTII,S$GLB,, | Performed by: NURSE PRACTITIONER

## 2019-05-31 PROCEDURE — 99999 PR PBB SHADOW E&M-EST. PATIENT-LVL IV: ICD-10-PCS | Mod: PBBFAC,,, | Performed by: NURSE PRACTITIONER

## 2019-05-31 PROCEDURE — 99214 OFFICE O/P EST MOD 30 MIN: CPT | Mod: S$GLB,,, | Performed by: NURSE PRACTITIONER

## 2019-05-31 PROCEDURE — 3078F PR MOST RECENT DIASTOLIC BLOOD PRESSURE < 80 MM HG: ICD-10-PCS | Mod: CPTII,S$GLB,, | Performed by: NURSE PRACTITIONER

## 2019-05-31 PROCEDURE — 3008F BODY MASS INDEX DOCD: CPT | Mod: CPTII,S$GLB,, | Performed by: NURSE PRACTITIONER

## 2019-05-31 RX ORDER — PANTOPRAZOLE SODIUM 40 MG/1
40 TABLET, DELAYED RELEASE ORAL DAILY
Qty: 30 TABLET | Refills: 1 | Status: SHIPPED | OUTPATIENT
Start: 2019-05-31 | End: 2019-07-23

## 2019-05-31 RX ORDER — ASPIRIN 81 MG/1
81 TABLET ORAL DAILY
COMMUNITY
End: 2021-04-27

## 2019-05-31 NOTE — PROGRESS NOTES
"Subjective:       Patient ID: Alondra Bryan is a 59 y.o. female Body mass index is 28.12 kg/m².    Chief Complaint: Gastroesophageal Reflux    This patient is new to me.  Established patient of Dr. Mckeon.    Gastroesophageal Reflux   She complains of abdominal pain (occasional tightness to epigastric area; denies currently), belching (frequent), coughing (nonproductive mostly; occasional productive; worse with lying flat), dysphagia (occasional with pills), globus sensation (frequent throat clearing), heartburn and nausea (occasional). She reports no chest pain, no choking, no hoarse voice or no sore throat. This is a chronic (started several years ago; reports at 24 years old she had "a scope in the office and showed hiatal hernia") problem. The problem occurs frequently (daily). The symptoms are aggravated by ETOH and caffeine. Pertinent negatives include no fatigue, melena or weight loss. Risk factors include smoking/tobacco exposure, NSAIDs, hiatal hernia, ETOH use and caffeine use (former smoker, mobic prn (last was a month ago, takes ibuprofen daily). She has tried an antacid (tums prn; PAST: protonix helped) for the symptoms.     Review of Systems   Constitutional: Negative for appetite change, chills, fatigue, fever and weight loss.   HENT: Positive for trouble swallowing. Negative for hoarse voice and sore throat.    Respiratory: Positive for cough (nonproductive mostly; occasional productive; worse with lying flat). Negative for choking and shortness of breath.    Cardiovascular: Negative for chest pain.   Gastrointestinal: Positive for abdominal pain (occasional tightness to epigastric area; denies currently), dysphagia (occasional with pills), heartburn and nausea (occasional). Negative for anal bleeding, blood in stool, constipation, diarrhea, melena, rectal pain and vomiting.   Genitourinary: Negative for difficulty urinating, dysuria and flank pain.   Neurological: Negative for weakness.       No LMP " recorded. Patient has had a hysterectomy.  Past Medical History:   Diagnosis Date    Arthritis     Asthma     Colon polyp     GERD (gastroesophageal reflux disease)     Herpes simplex without mention of complication     Hypertension     Hypothyroidism     Lung mass     seeing pulmonology     Past Surgical History:   Procedure Laterality Date    [id number] N/A 3/6/2017    [facility] CATH    BIOPSY-LUNG Right 2017    Performed by Lawson Angelo MD at [facility] OR [address] [place] [address]    BREAST BIOPSY Right 2016    needle bx    BREAST BIOPSY Left 2016    needle bx    BRONCHOSCOPY N/A 2017    Performed by Garima Llanos MD at [facility] OR [address] [place] [address]    CHOLECYSTECTOMY N/A     COLONOSCOPY  10/12/2018    Dr. Mckeon: 3 colon polyps removed, repeat in 5 years; biopsy HYPERPLASTIC POLYPs    COLONOSCOPY N/A 10/12/2018    [facility] ENDO    DISSECTION-LYMPH NODE Right 2017    Performed by Lawson Angelo MD at [facility] OR [address] [place] [address]    ENDOBRONCHIAL NAVIGATION N/A 2017    Performed by Garima [facility]lanos MD at [facility] OR [address] [place] [address]    ENDOBRONCHIAL ULTRASOUND (EBUS) N/A 2017    Performed by Garima [facility]lanos MD at [facility] OR [address] [place] [address]    HYSTERECTOMY      [facility] for fibroids    LUNG SURGERY  2017    removed right lower lobe, unscuccesful, inflammation    SINUS SURGERY      THORACOSCOPY-VIDEO ASSISTED (VATS) Right 2017    Performed by Lawson [facility]o MD at [facility] OR [address] [place] [address]     Family History   Problem Relation Age of Onset    Heart attack Father     Heart disease Father     Hypertension Brother     Lymphoma Sister     Hypertension Sister     Cancer Sister     Hypothyroidism Mother     Colon cancer Maternal Grandfather         dx 80    Breast cancer Neg Hx     Diabetes Neg Hx     Eclampsia Neg Hx     Miscarriages / Stillbirths Neg Hx     Ovarian cancer Neg Hx      labor Neg Hx     Stroke Neg Hx     Anesthesia problems Neg Hx      Crohn's disease Neg Hx     Ulcerative colitis Neg Hx     Stomach cancer Neg Hx     Esophageal cancer Neg Hx      Social History     Tobacco Use    Smoking status: Former Smoker     Packs/day: 1.00     Years: 20.00     Pack years: 20.00     Last attempt to quit: 1984     Years since quittin.4    Smokeless tobacco: Never Used    Tobacco comment: quit 20 yrs ago   Substance Use Topics    Alcohol use: Yes     Alcohol/week: 8.4 - 12.6 oz     Types: 14 - 21 Glasses of wine per week     Comment: drinks wine every night 1-3 glasses a night    Drug use: No     Wt Readings from Last 10 Encounters:   19 74.3 kg (163 lb 12.8 oz)   18 72.6 kg (160 lb)   10/12/18 70.3 kg (155 lb)   08/15/18 72.1 kg (159 lb)   08/15/18 70.9 kg (156 lb 4.9 oz)   18 72.2 kg (159 lb 2.8 oz)   11/10/17 70.3 kg (154 lb 15.7 oz)   17 69.1 kg (152 lb 6.4 oz)   17 71.7 kg (158 lb 1.1 oz)   17 73 kg (161 lb)     Lab Results   Component Value Date    WBC 8.12 2017    HGB 10.1 (L) 2017    HCT 30.0 (L) 2017    MCV 84 2017     2017     CMP  Sodium   Date Value Ref Range Status   2017 130 (L) 136 - 145 mmol/L Final     Potassium   Date Value Ref Range Status   2017 3.4 (L) 3.5 - 5.1 mmol/L Final     Chloride   Date Value Ref Range Status   2017 95 95 - 110 mmol/L Final     CO2   Date Value Ref Range Status   2017 26 23 - 29 mmol/L Final     Glucose   Date Value Ref Range Status   2017 102 70 - 110 mg/dL Final     BUN, Bld   Date Value Ref Range Status   2017 7 6 - 20 mg/dL Final     Creatinine   Date Value Ref Range Status   2017 0.7 0.5 - 1.4 mg/dL Final     Calcium   Date Value Ref Range Status   2017 8.3 (L) 8.7 - 10.5 mg/dL Final     Total Protein   Date Value Ref Range Status   2017 5.5 (L) 6.0 - 8.4 g/dL Final     Albumin   Date Value Ref Range Status   2017 2.5 (L) 3.5 - 5.2 g/dL Final     Total  Bilirubin   Date Value Ref Range Status   06/08/2017 0.4 0.1 - 1.0 mg/dL Final     Comment:     For infants and newborns, interpretation of results should be based  on gestational age, weight and in agreement with clinical  observations.  Premature Infant recommended reference ranges:  Up to 24 hours.............<8.0 mg/dL  Up to 48 hours............<12.0 mg/dL  3-5 days..................<15.0 mg/dL  6-29 days.................<15.0 mg/dL       Alkaline Phosphatase   Date Value Ref Range Status   06/08/2017 53 (L) 55 - 135 U/L Final     AST   Date Value Ref Range Status   06/08/2017 29 10 - 40 U/L Final     ALT   Date Value Ref Range Status   06/08/2017 43 10 - 44 U/L Final     Anion Gap   Date Value Ref Range Status   06/08/2017 9 8 - 16 mmol/L Final     eGFR if    Date Value Ref Range Status   06/08/2017 >60.0 >60 mL/min/1.73 m^2 Final     eGFR if non    Date Value Ref Range Status   06/08/2017 >60.0 >60 mL/min/1.73 m^2 Final     Comment:     Calculation used to obtain the estimated glomerular filtration  rate (eGFR) is the CKD-EPI equation. Since race is unknown   in our information system, the eGFR values for   -American and Non--American patients are given   for each creatinine result.       Lab Results   Component Value Date    TSH 0.393 (L) 01/23/2015     Reviewed prior medical records including radiology report of 8/21/18 ct chest & endoscopy history (see surgical history).    Objective:      Physical Exam   Constitutional: She is oriented to person, place, and time. She appears well-developed and well-nourished. No distress.   HENT:   Mouth/Throat: Oropharynx is clear and moist and mucous membranes are normal. No oral lesions. No oropharyngeal exudate.   Eyes: Pupils are equal, round, and reactive to light. Conjunctivae are normal. No scleral icterus.   Pulmonary/Chest: Effort normal and breath sounds normal. No respiratory distress. She has no wheezes.    Abdominal: Soft. Normal appearance and bowel sounds are normal. She exhibits no distension, no abdominal bruit and no mass. There is no tenderness. There is no rigidity, no rebound, no guarding, no tenderness at McBurney's point and negative Hernandez's sign.   Neurological: She is alert and oriented to person, place, and time.   Skin: Skin is warm and dry. No rash noted. She is not diaphoretic. No erythema. No pallor.   Non-jaundiced   Psychiatric: She has a normal mood and affect. Her behavior is normal. Judgment and thought content normal.   Nursing note and vitals reviewed.      Assessment:       1. Heartburn    2. History of lung mass    3. Pill dysphagia    4. Cough    5. Epigastric pain        Plan:       Heartburn  -  START   pantoprazole (PROTONIX) 40 MG tablet; Take 1 tablet (40 mg total) by mouth once daily. Before breakfast  Dispense: 30 tablet; Refill: 1, take in the morning 30-60 minutes before breakfast, discussed about possible long term use of medication (prefer to use lowest effective dose or discontinuing if possible) and discussed the risks & benefits with taking a reflux medication long term, and to take OTC calcium and vitamin d supplements as directed (such as Citracal +D), pt verbalized understanding  -discussed about the different types of medications used to treat reflux and how to use them, antacids can be used PRN for breakthrough heartburn symptoms by reducing stomach acid that is already produced, H2 blockers (zantac) work by limiting the amount acid production, & PPI's work to block acid production and are taken daily, patient verbalized understanding.  -Educated patient on lifestyle modifications to help control/reduce reflux/abdominal pain including: avoid large meals, avoid eating within 2-3 hours of bedtime (avoid late night eating & lying down soon after eating), elevate head of bed if nocturnal symptoms are present, smoking cessation (if current smoker), & weight loss (if  overweight).   -Educated to avoid known foods which trigger reflux symptoms & to minimize/avoid high-fat foods, chocolate, caffeine, citrus, alcohol, & tomato products.  -Advised to avoid/limit use of NSAID's, since they can cause GI upset, bleeding, and/or ulcers. If needed, take with food.   - schedule EGD, discussed procedure with patient, patient verbalized understanding    History of lung mass & Cough  Recommend follow-up with Primary Care Provider & pulmonology, Dr. Llanos, for continued evaluation and management.  - discussed with patient that certain medications, such as lisinopril, may be contributing to symptoms. I recommend follow-up with provider who manages medication to discuss about possible alternative therapy, patient verbalized understanding    Pill dysphagia  - schedule EGD, discussed procedure with patient and possible esophageal dilation may be performed during procedure if indicated, patient verbalized understanding  - educated patient to eat smaller more frequent meals and to eat slowly and advised to eat a soft diet.  - possible UGI/esophagram/esophageal manometry if symptoms persist    Epigastric pain  -  START   pantoprazole (PROTONIX) 40 MG tablet; Take 1 tablet (40 mg total) by mouth once daily. Before breakfast  Dispense: 30 tablet; Refill: 1  - schedule EGD, discussed procedure with patient, patient verbalized understanding    Follow up in about 1 month (around 6/30/2019), or if symptoms worsen or fail to improve.      If no improvement in symptoms or symptoms worsen, call/follow-up at clinic or go to ER.

## 2019-05-31 NOTE — PATIENT INSTRUCTIONS

## 2019-06-12 ENCOUNTER — TELEPHONE (OUTPATIENT)
Dept: GASTROENTEROLOGY | Facility: CLINIC | Age: 59
End: 2019-06-12

## 2019-06-12 NOTE — TELEPHONE ENCOUNTER
----- Message from Angie Rm sent at 6/12/2019  9:04 AM CDT -----  Contact: patient  Type: Needs Medical Advice    Who Called:  patient  Best Call Back Number: [phone]  Additional Information: calling to schedule an upper endoscopy

## 2019-06-12 NOTE — TELEPHONE ENCOUNTER
Spoke with pt. Scheduled EGD. Pt already had paperwork from clinic. Pt verbalized understanding to date & time.

## 2019-07-02 ENCOUNTER — ANESTHESIA EVENT (OUTPATIENT)
Dept: ENDOSCOPY | Facility: HOSPITAL | Age: 59
End: 2019-07-02
Payer: COMMERCIAL

## 2019-07-03 ENCOUNTER — ANESTHESIA (OUTPATIENT)
Dept: ENDOSCOPY | Facility: HOSPITAL | Age: 59
End: 2019-07-03
Payer: COMMERCIAL

## 2019-07-03 ENCOUNTER — HOSPITAL ENCOUNTER (OUTPATIENT)
Facility: HOSPITAL | Age: 59
Discharge: HOME | End: 2019-07-03
Attending: INTERNAL MEDICINE | Admitting: INTERNAL MEDICINE
Payer: COMMERCIAL

## 2019-07-03 VITALS
OXYGEN SATURATION: 96 % | HEIGHT: 64 IN | HEART RATE: 80 BPM | TEMPERATURE: 97 F | DIASTOLIC BLOOD PRESSURE: 63 MMHG | SYSTOLIC BLOOD PRESSURE: 102 MMHG | RESPIRATION RATE: 14 BRPM | WEIGHT: 160 LBS | BODY MASS INDEX: 27.31 KG/M2

## 2019-07-03 DIAGNOSIS — R10.13 EPIGASTRIC PAIN: ICD-10-CM

## 2019-07-03 LAB — H PYLORI INDEX VALUE: NEGATIVE

## 2019-07-03 PROCEDURE — 43239 EGD BIOPSY SINGLE/MULTIPLE: CPT | Mod: PO | Performed by: INTERNAL MEDICINE

## 2019-07-03 PROCEDURE — 43248 PR EGD, FLEX, W/DILATION OVER GUIDEWIRE: ICD-10-PCS | Mod: ,,, | Performed by: INTERNAL MEDICINE

## 2019-07-03 PROCEDURE — D9220A PRA ANESTHESIA: ICD-10-PCS | Mod: CRNA,,, | Performed by: NURSE ANESTHETIST, CERTIFIED REGISTERED

## 2019-07-03 PROCEDURE — 88305 TISSUE SPECIMEN TO PATHOLOGY - SURGERY: ICD-10-PCS | Mod: 26,,, | Performed by: PATHOLOGY

## 2019-07-03 PROCEDURE — D9220A PRA ANESTHESIA: ICD-10-PCS | Mod: ANES,,, | Performed by: ANESTHESIOLOGY

## 2019-07-03 PROCEDURE — D9220A PRA ANESTHESIA: Mod: CRNA,,, | Performed by: NURSE ANESTHETIST, CERTIFIED REGISTERED

## 2019-07-03 PROCEDURE — 87449 NOS EACH ORGANISM AG IA: CPT | Mod: PO | Performed by: INTERNAL MEDICINE

## 2019-07-03 PROCEDURE — 37000008 HC ANESTHESIA 1ST 15 MINUTES: Mod: PO | Performed by: INTERNAL MEDICINE

## 2019-07-03 PROCEDURE — 43239 EGD BIOPSY SINGLE/MULTIPLE: CPT | Mod: 59,,, | Performed by: INTERNAL MEDICINE

## 2019-07-03 PROCEDURE — 27201012 HC FORCEPS, HOT/COLD, DISP: Mod: PO | Performed by: INTERNAL MEDICINE

## 2019-07-03 PROCEDURE — 37000009 HC ANESTHESIA EA ADD 15 MINS: Mod: PO | Performed by: INTERNAL MEDICINE

## 2019-07-03 PROCEDURE — 63600175 PHARM REV CODE 636 W HCPCS: Mod: PO | Performed by: NURSE ANESTHETIST, CERTIFIED REGISTERED

## 2019-07-03 PROCEDURE — 43239 PR EGD, FLEX, W/BIOPSY, SGL/MULTI: ICD-10-PCS | Mod: 59,,, | Performed by: INTERNAL MEDICINE

## 2019-07-03 PROCEDURE — 88342 IMHCHEM/IMCYTCHM 1ST ANTB: CPT | Mod: 26,,, | Performed by: PATHOLOGY

## 2019-07-03 PROCEDURE — 43248 EGD GUIDE WIRE INSERTION: CPT | Mod: PO | Performed by: INTERNAL MEDICINE

## 2019-07-03 PROCEDURE — 88305 TISSUE EXAM BY PATHOLOGIST: CPT | Performed by: PATHOLOGY

## 2019-07-03 PROCEDURE — 25000003 PHARM REV CODE 250: Mod: PO | Performed by: NURSE ANESTHETIST, CERTIFIED REGISTERED

## 2019-07-03 PROCEDURE — 88342 TISSUE SPECIMEN TO PATHOLOGY - SURGERY: ICD-10-PCS | Mod: 26,,, | Performed by: PATHOLOGY

## 2019-07-03 PROCEDURE — 43248 EGD GUIDE WIRE INSERTION: CPT | Mod: ,,, | Performed by: INTERNAL MEDICINE

## 2019-07-03 PROCEDURE — 25000003 PHARM REV CODE 250: Mod: PO | Performed by: INTERNAL MEDICINE

## 2019-07-03 PROCEDURE — D9220A PRA ANESTHESIA: Mod: ANES,,, | Performed by: ANESTHESIOLOGY

## 2019-07-03 RX ORDER — SODIUM CHLORIDE, SODIUM LACTATE, POTASSIUM CHLORIDE, CALCIUM CHLORIDE 600; 310; 30; 20 MG/100ML; MG/100ML; MG/100ML; MG/100ML
INJECTION, SOLUTION INTRAVENOUS CONTINUOUS
Status: DISCONTINUED | OUTPATIENT
Start: 2019-07-03 | End: 2019-07-03 | Stop reason: HOSPADM

## 2019-07-03 RX ORDER — LIDOCAINE HCL/PF 100 MG/5ML
SYRINGE (ML) INTRAVENOUS
Status: DISCONTINUED | OUTPATIENT
Start: 2019-07-03 | End: 2019-07-03

## 2019-07-03 RX ORDER — GLYCOPYRROLATE 0.2 MG/ML
INJECTION INTRAMUSCULAR; INTRAVENOUS
Status: DISCONTINUED | OUTPATIENT
Start: 2019-07-03 | End: 2019-07-03

## 2019-07-03 RX ORDER — PROPOFOL 10 MG/ML
VIAL (ML) INTRAVENOUS
Status: DISCONTINUED | OUTPATIENT
Start: 2019-07-03 | End: 2019-07-03

## 2019-07-03 RX ORDER — LIDOCAINE HYDROCHLORIDE 10 MG/ML
1 INJECTION, SOLUTION EPIDURAL; INFILTRATION; INTRACAUDAL; PERINEURAL ONCE
Status: DISCONTINUED | OUTPATIENT
Start: 2019-07-03 | End: 2019-07-03 | Stop reason: HOSPADM

## 2019-07-03 RX ORDER — SODIUM CHLORIDE 0.9 % (FLUSH) 0.9 %
10 SYRINGE (ML) INJECTION
Status: DISCONTINUED | OUTPATIENT
Start: 2019-07-03 | End: 2019-07-03 | Stop reason: HOSPADM

## 2019-07-03 RX ORDER — FENTANYL CITRATE 50 UG/ML
INJECTION, SOLUTION INTRAMUSCULAR; INTRAVENOUS
Status: DISCONTINUED | OUTPATIENT
Start: 2019-07-03 | End: 2019-07-03

## 2019-07-03 RX ORDER — ESOMEPRAZOLE MAGNESIUM 40 MG/1
40 CAPSULE, DELAYED RELEASE ORAL DAILY
Qty: 30 CAPSULE | Refills: 2 | Status: SHIPPED | OUTPATIENT
Start: 2019-07-03 | End: 2019-09-03 | Stop reason: SDUPTHER

## 2019-07-03 RX ADMIN — FENTANYL CITRATE 25 MCG: 50 INJECTION, SOLUTION INTRAMUSCULAR; INTRAVENOUS at 09:07

## 2019-07-03 RX ADMIN — PROPOFOL 30 MG: 10 INJECTION, EMULSION INTRAVENOUS at 09:07

## 2019-07-03 RX ADMIN — SODIUM CHLORIDE, SODIUM LACTATE, POTASSIUM CHLORIDE, AND CALCIUM CHLORIDE: .6; .31; .03; .02 INJECTION, SOLUTION INTRAVENOUS at 09:07

## 2019-07-03 RX ADMIN — LIDOCAINE HYDROCHLORIDE 100 MG: 20 INJECTION, SOLUTION INTRAVENOUS at 09:07

## 2019-07-03 RX ADMIN — GLYCOPYRROLATE 0.2 MG: 0.2 INJECTION, SOLUTION INTRAMUSCULAR; INTRAVENOUS at 09:07

## 2019-07-03 RX ADMIN — PROPOFOL 100 MG: 10 INJECTION, EMULSION INTRAVENOUS at 09:07

## 2019-07-03 NOTE — DISCHARGE SUMMARY
Discharge Note  Short Stay      SUMMARY     Admit Date: 7/3/2019    Attending Physician: Sam Mckeon MD     Discharge Physician: Sam Mckeon MD    Discharge Date: 7/3/2019 10:04 AM    Final Diagnosis: Heartburn [R12]  Pill dysphagia [R13.10]  Epigastric pain [R10.13]    Disposition: HOME OR SELF CARE    Patient Instructions:   Current Discharge Medication List      CONTINUE these medications which have NOT CHANGED    Details   ARMOUR THYROID 60 mg Tab 60 mg every morning.   Refills: 3      aspirin (ECOTRIN) 81 MG EC tablet Take 81 mg by mouth once daily.      calcium carbonate (CALCIUM ANTACID) 300 mg (750 mg) Chew Take by mouth daily as needed.      cyanocobalamin 1,000 mcg/mL injection 1,000 mcg every 28 days. Takes 1st of every month      doxycycline (ORACEA) 40 mg capsule Take 40 mg by mouth every other day.       ibuprofen (ADVIL,MOTRIN) 100 MG tablet Take 100 mg by mouth every 6 (six) hours as needed for Temperature greater than.      lisinopril 10 MG tablet TAKE 1 TABLET BY MOUTH ONCE A DAY  Qty: 30 tablet, Refills: 12      meloxicam (MOBIC) 15 MG tablet TAKE 1 TABLET BY MOUTH ONCE A DAY  Qty: 30 tablet, Refills: 13      multivitamin with folic acid (ONE DAILY ESSENTIAL) 400 mcg Tab Take 1 tablet by mouth every morning.       pantoprazole (PROTONIX) 40 MG tablet Take 1 tablet (40 mg total) by mouth once daily. Before breakfast  Qty: 30 tablet, Refills: 1    Associated Diagnoses: Heartburn; Epigastric pain      progesterone (PROMETRIUM) 100 MG capsule Take 100 mg by mouth once daily.      SOOLANTRA 1 % Crea every other day.              Discharge Procedure Orders (must include Diet, Follow-up, Activity)    Follow Up:  Follow up with PCP as previously scheduled  Resume routine diet.  Activity as tolerated.    No driving day of procedure.

## 2019-07-03 NOTE — ANESTHESIA POSTPROCEDURE EVALUATION
Anesthesia Post Evaluation    Patient: Alondra Bryan    Procedure(s) Performed: Procedure(s) (LRB):  EGD (ESOPHAGOGASTRODUODENOSCOPY) (N/A)    Final Anesthesia Type: general  Patient location during evaluation: PACU  Patient participation: Yes- Able to Participate  Level of consciousness: awake and alert and oriented  Post-procedure vital signs: reviewed and stable  Pain management: adequate  Airway patency: patent  PONV status at discharge: No PONV  Anesthetic complications: no      Cardiovascular status: blood pressure returned to baseline and stable  Respiratory status: unassisted and spontaneous ventilation  Hydration status: euvolemic  Follow-up not needed.          Vitals Value Taken Time   /72 7/3/2019  9:06 AM   Temp 36.2 °C (97.2 °F) 7/3/2019  9:06 AM   Pulse 63 7/3/2019  9:06 AM   Resp 16 7/3/2019  9:06 AM   SpO2 97 % 7/3/2019  9:06 AM         No case tracking events are documented in the log.      Pain/Taylor Score: No data recorded

## 2019-07-03 NOTE — H&P
History & Physical - Short Stay  Gastroenterology      SUBJECTIVE:     Procedure: EGD    Chief Complaint/Indication for Procedure: Dysphagia and Epigastric Pain    PTA Medications   Medication Sig    ARMOUR THYROID 60 mg Tab 60 mg every morning.     aspirin (ECOTRIN) 81 MG EC tablet Take 81 mg by mouth once daily.    calcium carbonate (CALCIUM ANTACID) 300 mg (750 mg) Chew Take by mouth daily as needed.    cyanocobalamin 1,000 mcg/mL injection 1,000 mcg every 28 days. Takes 1st of every month    doxycycline (ORACEA) 40 mg capsule Take 40 mg by mouth every other day.     ibuprofen (ADVIL,MOTRIN) 100 MG tablet Take 100 mg by mouth every 6 (six) hours as needed for Temperature greater than.    lisinopril 10 MG tablet TAKE 1 TABLET BY MOUTH ONCE A DAY    meloxicam (MOBIC) 15 MG tablet TAKE 1 TABLET BY MOUTH ONCE A DAY    multivitamin with folic acid (ONE DAILY ESSENTIAL) 400 mcg Tab Take 1 tablet by mouth every morning.     progesterone (PROMETRIUM) 100 MG capsule Take 100 mg by mouth once daily.    pantoprazole (PROTONIX) 40 MG tablet Take 1 tablet (40 mg total) by mouth once daily. Before breakfast    SOOLANTRA 1 % Crea every other day.        Review of patient's allergies indicates:   Allergen Reactions    Pcn [penicillins] Hives        Past Medical History:   Diagnosis Date    Arthritis     Asthma     Colon polyp     GERD (gastroesophageal reflux disease)     Herpes simplex without mention of complication     Hypertension     Hypothyroidism     Lung mass     seeing pulmonology     Past Surgical History:   Procedure Laterality Date    YILGQT-SVLRCT-KC N/A 3/6/2017    [facility] CATH    BIOPSY-LUNG Right 6/5/2017    Per[facility] by Lawson [facility] at [facility] FLR    BREAST BIOPSY Right 2016    needle bx    BREAST BIOPSY Left 2016    needle bx    BRONCHOSCOPY N/A 4/11/2017    Performed by Garima Llanos MD at [facility] OR 2ND FLR    CHOLECYSTECTOMY N/A      COLONOSCOPY  10/12/2018    Dr. Mckeon: 3 colon polyps removed, repeat in 5 years; biopsy HYPERPLASTIC POLYPs    COLONOSCOPY N/A 10/12/2018    [facility] at [facility] ENDO    DISSECTION-LYMPH NODE Right 2017    [facility]    ENDOBRONCHIAL NAVIGATION N/A 2017    Performed by Garima Llanos MD at [facility] OR [address] [place] [address]    ENDOBRONCHIAL ULTRASOUND (EBUS) N/A 2017    [facility]    HYSTERECTOMY      [facility] for fibroids    LUNG SURGERY  2017    removed right lower lobe, unscuccesful, inflammation    SINUS SURGERY      THORACOSCOPY-VIDEO ASSISTED (VATS) Right 2017    Performed by Lawson Angelo MD at [facility] OR [address] [place] [address]     Family History   Problem Relation Age of Onset    Heart attack Father     Heart disease Father     Hypertension Brother     Lymphoma Sister     Hypertension Sister     Cancer Sister     Hypothyroidism Mother     Colon cancer Maternal Grandfather         dx 80    Breast cancer Neg Hx     Diabetes Neg Hx     Eclampsia Neg Hx     Miscarriages / Stillbirths Neg Hx     Ovarian cancer Neg Hx      labor Neg Hx     Stroke Neg Hx     Anesthesia problems Neg Hx     Crohn's disease Neg Hx     Ulcerative colitis Neg Hx     Stomach cancer Neg Hx     Esophageal cancer Neg Hx      Social History     Tobacco Use    Smoking status: Former Smoker     Packs/day: 1.00     Years: 20.00     Pack years: 20.00     Last attempt to quit: 1984     Years since quittin.5    Smokeless tobacco: Never Used    Tobacco comment: quit 20 yrs ago   Substance Use Topics    Alcohol use: Yes     Alcohol/week: 8.4 - 12.6 oz     Types: 14 - 21 Glasses of wine per week     Comment: drinks wine every night 1-3 glasses a night    Drug use: No         OBJECTIVE:     Vital Signs (Most Recent)       Physical Exam:                                                       GENERAL:   Comfortable, in no acute distress.                                 HEENT EXAM:  Nonicteric.  No adenopathy.  Oropharynx is clear.               NECK:  Supple.                                                               LUNGS:  Clear.                                                               CARDIAC:  Regular rate and rhythm.  S1, S2.  No murmur.                      ABDOMEN:  Soft, positive bowel sounds, nontender.  No hepatosplenomegaly or masses.  No rebound or guarding.                                             EXTREMITIES:  No edema.     MENTAL STATUS:  Normal, alert and oriented.      ASSESSMENT/PLAN:     Assessment: Dysphagia and Epigastric Pain    Plan: EGD    Anesthesia Plan: General    ASA Grade: ASA 2 - Patient with mild systemic disease with no functional limitations    MALLAMPATI SCORE:  I (soft palate, uvula, fauces, and tonsillar pillars visible)

## 2019-07-03 NOTE — DISCHARGE INSTRUCTIONS

## 2019-07-03 NOTE — TRANSFER OF CARE
"Anesthesia Transfer of Care Note    Patient: Alondra Bryan    Procedure(s) Performed: Procedure(s) (LRB):  EGD (ESOPHAGOGASTRODUODENOSCOPY) (N/A)    Patient location: PACU    Anesthesia Type: general    Transport from OR: Transported from OR on room air with adequate spontaneous ventilation    Post pain: adequate analgesia    Post assessment: no apparent anesthetic complications and tolerated procedure well    Post vital signs: stable    Level of consciousness: awake and sedated    Nausea/Vomiting: no nausea/vomiting    Complications: none    Transfer of care protocol was followed      Last vitals:   Visit Vitals  /72 (BP Location: Right arm, Patient Position: Lying)   Pulse 63   Temp 36.2 °C (97.2 °F) (Skin)   Resp 16   Ht 5' 4" (1.626 m)   Wt 72.6 kg (160 lb)   SpO2 97%   Breastfeeding? No   BMI 27.46 kg/m²     "

## 2019-07-03 NOTE — ANESTHESIA PREPROCEDURE EVALUATION
07/03/2019  Alondra Bryan is a 59 y.o., female.    Anesthesia Evaluation    I have reviewed the Patient Summary Reports.    I have reviewed the Nursing Notes.   I have reviewed the Medications.     Review of Systems  Anesthesia Hx:  Hx of Anesthetic complications (PONV)    Social:  Former Smoker    Cardiovascular:   Hypertension    Pulmonary:   Asthma asymptomatic and mild RLL infectious process years ago with a mass - now the RLL is atrophied.  Pt has no respiratory symptoms now.   Renal/:   Chronic Renal Disease (renal angiomyolipoma)    Hepatic/GI:   GERD, well controlled    Neurological:  Neurology Normal    Endocrine:   Hypothyroidism        Physical Exam  General:  Well nourished    Airway/Jaw/Neck:  Airway Findings: Mouth Opening: Normal Tongue: Normal  General Airway Assessment: Adult  Oropharynx Findings:  Mallampati: II  Jaw/Neck Findings:  Neck ROM: Normal ROM     Eyes/Ears/Nose:  Eyes/Ears/Nose Findings:    Dental:  Dental Findings:   Chest/Lungs:  Chest/Lungs Findings: Normal Respiratory Rate     Heart/Vascular:  Heart Findings: Rate: Normal  Rhythm: Regular Rhythm        Mental Status:  Mental Status Findings:  Cooperative, Alert and Oriented         Anesthesia Plan  Type of Anesthesia, risks & benefits discussed:  Anesthesia Type:  general  Patient's Preference:   Intra-op Monitoring Plan: standard ASA monitors  Intra-op Monitoring Plan Comments:   Post Op Pain Control Plan: multimodal analgesia  Post Op Pain Control Plan Comments:   Induction:   IV  Beta Blocker:  Patient is not currently on a Beta-Blocker (No further documentation required).       Informed Consent: Patient understands risks and agrees with Anesthesia plan.  Questions answered. Anesthesia consent signed with patient.  ASA Score: 3     Day of Surgery Review of History & Physical:  There are no significant changes.   H&P  completed by Anesthesiologist.       Ready For Surgery From Anesthesia Perspective.

## 2019-07-03 NOTE — [employer] PATIENT INSTRUCTIONS
Discharge Summary/Instructions after an Endoscopic Procedure  Patient Name: Alondra Bryan  Patient MRN: [id number]  Patient YOB: 1960     Wednesday, July 03, 2019  Sam Mckeon MD  RESTRICTIONS:  During your procedure today, you received medications for sedation.  These   medications may affect your judgment, balance and coordination.  Therefore,   for 24 hours, you have the following restrictions:   - DO NOT drive a car, operate machinery, make legal/financial decisions,   sign important papers or drink alcohol.    ACTIVITY:  Today: no heavy lifting, straining or running due to procedural   sedation/anesthesia.  The following day: return to full activity including work.  DIET:  Eat and drink normally unless instructed otherwise.     TREATMENT FOR COMMON SIDE EFFECTS:  - Mild abdominal pain, nausea, belching, bloating or excessive gas:  rest,   eat lightly and use a heating pad.  - Sore Throat: treat with throat lozenges and/or gargle with warm salt   water.  - Because air was used during the procedure, expelling large amounts of air   from your rectum or belching is normal.  - If a bowel prep was taken, you may not have a bowel movement for 1-3 days.    This is normal.  SYMPTOMS TO WATCH FOR AND REPORT TO YOUR PHYSICIAN:  1. Abdominal pain or bloating, other than gas cramps.  2. Chest pain.  3. Back pain.  4. Signs of infection such as: chills or fever occurring within 24 hours   after the procedure.  5. Rectal bleeding, which would show as bright red, maroon, or black stools.   (A tablespoon of blood from the rectum is not serious, especially if   hemorrhoids are present.)  6. Vomiting.  7. Weakness or dizziness.  GO DIRECTLY TO THE NEAREST EMERGENCY ROOM IF YOU HAVE ANY OF THE FOLLOWING:      Difficulty breathing              Chills and/or fever over 101 F   Persistent vomiting and/or vomiting blood   Severe abdominal pain   Severe chest pain   Black, tarry stools   Bleeding- more than one  tablespoon   Any other symptom or condition that you feel may need urgent attention  Your doctor recommends these additional instructions:  If any biopsies were taken, your doctors clinic will contact you in 1 to 2   weeks with any results.  We are waiting for your pathology results.   Continue your present medications.   Your physician has recommended a repeat upper endoscopy as needed for   retreatment.   You are being discharged to home.  For questions, problems or results please call your physician - Sam Mckeon MD at Work:  [phone].  EMERGENCY PHONE NUMBER: [phone], LAB RESULTS: [phone]  IF A COMPLICATION OR EMERGENCY SITUATION ARISES AND YOU ARE UNABLE TO REACH   YOUR PHYSICIAN - GO DIRECTLY TO THE EMERGENCY ROOM.  ___________________________________________  Nurse Signature  ___________________________________________  Patient/Designated Responsible Party Signature  Sam Mckeon MD  7/3/2019 10:03:02 AM  This report has been verified and signed electronically.  [employer]

## 2019-07-23 ENCOUNTER — OFFICE VISIT (OUTPATIENT)
Dept: CARDIOLOGY | Facility: CLINIC | Age: 59
End: 2019-07-23
Payer: COMMERCIAL

## 2019-07-23 VITALS
WEIGHT: 161.81 LBS | DIASTOLIC BLOOD PRESSURE: 75 MMHG | HEIGHT: 64 IN | HEART RATE: 63 BPM | SYSTOLIC BLOOD PRESSURE: 136 MMHG | BODY MASS INDEX: 27.63 KG/M2

## 2019-07-23 DIAGNOSIS — R06.02 SOB (SHORTNESS OF BREATH) ON EXERTION: ICD-10-CM

## 2019-07-23 DIAGNOSIS — I10 ESSENTIAL HYPERTENSION: Primary | ICD-10-CM

## 2019-07-23 DIAGNOSIS — Z82.49 FAMILY HISTORY OF EARLY CAD: ICD-10-CM

## 2019-07-23 PROCEDURE — 99202 PR OFFICE/OUTPT VISIT, NEW, LEVL II, 15-29 MIN: ICD-10-PCS | Mod: S$GLB,,, | Performed by: INTERNAL MEDICINE

## 2019-07-23 PROCEDURE — 3078F DIAST BP <80 MM HG: CPT | Mod: CPTII,S$GLB,, | Performed by: INTERNAL MEDICINE

## 2019-07-23 PROCEDURE — 3075F SYST BP GE 130 - 139MM HG: CPT | Mod: CPTII,S$GLB,, | Performed by: INTERNAL MEDICINE

## 2019-07-23 PROCEDURE — 99202 OFFICE O/P NEW SF 15 MIN: CPT | Mod: S$GLB,,, | Performed by: INTERNAL MEDICINE

## 2019-07-23 PROCEDURE — 99999 PR PBB SHADOW E&M-EST. PATIENT-LVL III: CPT | Mod: PBBFAC,,, | Performed by: INTERNAL MEDICINE

## 2019-07-23 PROCEDURE — 3008F BODY MASS INDEX DOCD: CPT | Mod: CPTII,S$GLB,, | Performed by: INTERNAL MEDICINE

## 2019-07-23 PROCEDURE — 99999 PR PBB SHADOW E&M-EST. PATIENT-LVL III: ICD-10-PCS | Mod: PBBFAC,,, | Performed by: INTERNAL MEDICINE

## 2019-07-23 PROCEDURE — 3008F PR BODY MASS INDEX (BMI) DOCUMENTED: ICD-10-PCS | Mod: CPTII,S$GLB,, | Performed by: INTERNAL MEDICINE

## 2019-07-23 PROCEDURE — 3075F PR MOST RECENT SYSTOLIC BLOOD PRESS GE 130-139MM HG: ICD-10-PCS | Mod: CPTII,S$GLB,, | Performed by: INTERNAL MEDICINE

## 2019-07-23 PROCEDURE — 3078F PR MOST RECENT DIASTOLIC BLOOD PRESSURE < 80 MM HG: ICD-10-PCS | Mod: CPTII,S$GLB,, | Performed by: INTERNAL MEDICINE

## 2019-07-23 RX ORDER — ESOMEPRAZOLE MAGNESIUM 40 MG/1
40 CAPSULE, DELAYED RELEASE ORAL
COMMUNITY
End: 2019-09-13 | Stop reason: SDUPTHER

## 2019-07-23 NOTE — PROGRESS NOTES
Subjective:    Patient ID:  Alondra Bryan is a 59 y.o. female who presents for evaluation of hypertension    HPI  She comes with occasional shortness of breath with exertion, no chest pain      Review of Systems   Constitution: Negative for decreased appetite, malaise/fatigue, weight gain and weight loss.   Cardiovascular: Negative for chest pain, dyspnea on exertion, leg swelling, palpitations and syncope.   Respiratory: Negative for cough and shortness of breath.    Gastrointestinal: Negative.    Neurological: Negative for weakness.   All other systems reviewed and are negative.       Objective:      Physical Exam   Constitutional: She is oriented to person, place, and time. She appears well-developed and well-nourished.   HENT:   Head: Normocephalic.   Eyes: Pupils are equal, round, and reactive to light.   Neck: Normal range of motion. Neck supple. No JVD present. Carotid bruit is not present. No thyromegaly present.   Cardiovascular: Normal rate, regular rhythm, normal heart sounds, intact distal pulses and normal pulses. PMI is not displaced. Exam reveals no gallop.   No murmur heard.  Pulmonary/Chest: Effort normal and breath sounds normal.   Abdominal: Soft. Normal appearance. She exhibits no mass. There is no hepatosplenomegaly. There is no tenderness.   Musculoskeletal: Normal range of motion. She exhibits no edema.   Neurological: She is alert and oriented to person, place, and time. She has normal strength and normal reflexes. No sensory deficit.   Skin: Skin is warm and intact.   Psychiatric: She has a normal mood and affect.   Nursing note and vitals reviewed.        Assessment:       1. Essential hypertension    2. Family history of early CAD         Plan:     Stress echo  Call with results  1 yr f/u if test ok

## 2019-08-02 ENCOUNTER — CLINICAL SUPPORT (OUTPATIENT)
Dept: CARDIOLOGY | Facility: CLINIC | Age: 59
End: 2019-08-02
Attending: INTERNAL MEDICINE
Payer: COMMERCIAL

## 2019-08-02 VITALS — BODY MASS INDEX: 27.49 KG/M2 | HEIGHT: 64 IN | WEIGHT: 161 LBS

## 2019-08-02 DIAGNOSIS — I10 ESSENTIAL HYPERTENSION: ICD-10-CM

## 2019-08-02 DIAGNOSIS — Z82.49 FAMILY HISTORY OF EARLY CAD: ICD-10-CM

## 2019-08-02 DIAGNOSIS — R06.02 SOB (SHORTNESS OF BREATH) ON EXERTION: ICD-10-CM

## 2019-08-02 PROCEDURE — 93351 ECHOCARDIOGRAM STRESS TEST WITH COLOR FLOW DOPPLER (CUPID ONLY): ICD-10-PCS | Mod: S$GLB,,, | Performed by: INTERNAL MEDICINE

## 2019-08-02 PROCEDURE — 99999 PR PBB SHADOW E&M-EST. PATIENT-LVL I: CPT | Mod: PBBFAC,,,

## 2019-08-02 PROCEDURE — 93351 STRESS TTE COMPLETE: CPT | Mod: S$GLB,,, | Performed by: INTERNAL MEDICINE

## 2019-08-02 PROCEDURE — 99999 PR PBB SHADOW E&M-EST. PATIENT-LVL I: ICD-10-PCS | Mod: PBBFAC,,,

## 2019-08-05 LAB
ASCENDING AORTA: 2.83 CM
AV INDEX (PROSTH): 0.86
AV MEAN GRADIENT: 3 MMHG
AV PEAK GRADIENT: 6 MMHG
AV VALVE AREA: 2.99 CM2
AV VELOCITY RATIO: 0.83
BSA FOR ECHO PROCEDURE: 1.82 M2
CV ECHO LV RWT: 0.41 CM
CV STRESS BASE HR: 89 BPM
DIASTOLIC BLOOD PRESSURE: 89 MMHG
DOP CALC AO PEAK VEL: 1.24 M/S
DOP CALC AO VTI: 31.12 CM
DOP CALC LVOT AREA: 3.5 CM2
DOP CALC LVOT DIAMETER: 2.11 CM
DOP CALC LVOT PEAK VEL: 1.03 M/S
DOP CALC LVOT STROKE VOLUME: 93.1 CM3
DOP CALCLVOT PEAK VEL VTI: 26.64 CM
E WAVE DECELERATION TIME: 173.57 MSEC
E/A RATIO: 0.8
E/E' RATIO: 7.7 M/S
ECHO LV POSTERIOR WALL: 0.84 CM (ref 0.6–1.1)
FRACTIONAL SHORTENING: 44 % (ref 28–44)
INTERVENTRICULAR SEPTUM: 1.01 CM (ref 0.6–1.1)
IVRT: 0.12 MSEC
LA MAJOR: 4.45 CM
LA MINOR: 5.07 CM
LA WIDTH: 3.52 CM
LEFT ATRIUM SIZE: 3.32 CM
LEFT ATRIUM VOLUME INDEX: 26.4 ML/M2
LEFT ATRIUM VOLUME: 47.08 CM3
LEFT INTERNAL DIMENSION IN SYSTOLE: 2.32 CM (ref 2.1–4)
LEFT VENTRICLE DIASTOLIC VOLUME INDEX: 42.11 ML/M2
LEFT VENTRICLE DIASTOLIC VOLUME: 75.12 ML
LEFT VENTRICLE MASS INDEX: 67 G/M2
LEFT VENTRICLE SYSTOLIC VOLUME INDEX: 10.4 ML/M2
LEFT VENTRICLE SYSTOLIC VOLUME: 18.55 ML
LEFT VENTRICULAR INTERNAL DIMENSION IN DIASTOLE: 4.12 CM (ref 3.5–6)
LEFT VENTRICULAR MASS: 119.44 G
LV LATERAL E/E' RATIO: 6.42 M/S
LV SEPTAL E/E' RATIO: 9.63 M/S
MV PEAK A VEL: 0.96 M/S
MV PEAK E VEL: 0.77 M/S
OHS CV CPX 1 MINUTE RECOVERY HEART RATE: 98 BPM
OHS CV CPX 85 PERCENT MAX PREDICTED HEART RATE MALE: 130
OHS CV CPX ESTIMATED METS: 11
OHS CV CPX MAX PREDICTED HEART RATE: 153
OHS CV CPX PATIENT IS FEMALE: 1
OHS CV CPX PATIENT IS MALE: 0
OHS CV CPX PEAK DIASTOLIC BLOOD PRESSURE: 98 MMHG
OHS CV CPX PEAK HEAR RATE: 146 BPM
OHS CV CPX PEAK RATE PRESSURE PRODUCT: NORMAL
OHS CV CPX PEAK SYSTOLIC BLOOD PRESSURE: 186 MMHG
OHS CV CPX PERCENT MAX PREDICTED HEART RATE ACHIEVED: 95
OHS CV CPX RATE PRESSURE PRODUCT PRESENTING: NORMAL
PISA TR MAX VEL: 2.46 M/S
PULM VEIN S/D RATIO: 1.36
PV PEAK D VEL: 0.47 M/S
PV PEAK S VEL: 0.64 M/S
RA MAJOR: 4.27 CM
RA PRESSURE: 3 MMHG
RA WIDTH: 3.02 CM
RIGHT VENTRICULAR END-DIASTOLIC DIMENSION: 3.21 CM
SINUS: 2.59 CM
STJ: 2.16 CM
STRESS ECHO POST EXERCISE DUR MIN: 6 MINUTES
STRESS ECHO POST EXERCISE DUR SEC: 34 SECONDS
SYSTOLIC BLOOD PRESSURE: 158 MMHG
TDI LATERAL: 0.12 M/S
TDI SEPTAL: 0.08 M/S
TDI: 0.1 M/S
TR MAX PG: 24 MMHG
TRICUSPID ANNULAR PLANE SYSTOLIC EXCURSION: 2.19 CM
TV REST PULMONARY ARTERY PRESSURE: 27 MMHG

## 2019-09-03 RX ORDER — ESOMEPRAZOLE MAGNESIUM 40 MG/1
40 CAPSULE, DELAYED RELEASE ORAL DAILY
Qty: 30 CAPSULE | Refills: 2 | Status: SHIPPED | OUTPATIENT
Start: 2019-09-03 | End: 2019-11-25 | Stop reason: SDUPTHER

## 2019-09-26 ENCOUNTER — PATIENT MESSAGE (OUTPATIENT)
Dept: PULMONOLOGY | Facility: CLINIC | Age: 59
End: 2019-09-26

## 2019-11-14 ENCOUNTER — TELEPHONE (OUTPATIENT)
Dept: PULMONOLOGY | Facility: CLINIC | Age: 59
End: 2019-11-14

## 2019-11-14 DIAGNOSIS — R91.8 RIGHT LOWER LOBE LUNG MASS: Primary | ICD-10-CM

## 2019-11-14 NOTE — TELEPHONE ENCOUNTER
----- Message from Josie Diehl sent at 11/14/2019 11:47 AM CST -----  Contact: Alondra         tel:  [phone]        Caller says she is returning your call.     pls call.

## 2019-11-14 NOTE — TELEPHONE ENCOUNTER
I left the patient a voicemail to return my call. I was calling to let patient know she needs a ct prior to her appointment on 11/20/19 with Dr Llanos.

## 2019-11-15 ENCOUNTER — HOSPITAL ENCOUNTER (OUTPATIENT)
Dept: RADIOLOGY | Facility: HOSPITAL | Age: 59
Discharge: HOME | End: 2019-11-15
Attending: INTERNAL MEDICINE
Payer: COMMERCIAL

## 2019-11-15 DIAGNOSIS — R91.8 RIGHT LOWER LOBE LUNG MASS: ICD-10-CM

## 2019-11-15 PROCEDURE — 71250 CT THORAX DX C-: CPT | Mod: 26,,, | Performed by: RADIOLOGY

## 2019-11-15 PROCEDURE — 71250 CT CHEST WITHOUT CONTRAST: ICD-10-PCS | Mod: 26,,, | Performed by: RADIOLOGY

## 2019-11-15 PROCEDURE — 71250 CT THORAX DX C-: CPT | Mod: TC,PO

## 2019-11-20 ENCOUNTER — OFFICE VISIT (OUTPATIENT)
Dept: PULMONOLOGY | Facility: CLINIC | Age: 59
End: 2019-11-20
Payer: COMMERCIAL

## 2019-11-20 VITALS
WEIGHT: 161.81 LBS | SYSTOLIC BLOOD PRESSURE: 128 MMHG | HEIGHT: 64 IN | HEART RATE: 85 BPM | DIASTOLIC BLOOD PRESSURE: 78 MMHG | OXYGEN SATURATION: 99 % | BODY MASS INDEX: 27.63 KG/M2

## 2019-11-20 DIAGNOSIS — E04.1 THYROID NODULE: Primary | ICD-10-CM

## 2019-11-20 DIAGNOSIS — J84.10 GRANULOMATOUS LUNG DISEASE: ICD-10-CM

## 2019-11-20 PROBLEM — R05.9 COUGH: Status: RESOLVED | Noted: 2017-05-17 | Resolved: 2019-11-20

## 2019-11-20 PROCEDURE — 99214 PR OFFICE/OUTPT VISIT, EST, LEVL IV, 30-39 MIN: ICD-10-PCS | Mod: S$GLB,,, | Performed by: INTERNAL MEDICINE

## 2019-11-20 PROCEDURE — 3078F DIAST BP <80 MM HG: CPT | Mod: CPTII,S$GLB,, | Performed by: INTERNAL MEDICINE

## 2019-11-20 PROCEDURE — 99999 PR PBB SHADOW E&M-EST. PATIENT-LVL III: ICD-10-PCS | Mod: PBBFAC,,, | Performed by: INTERNAL MEDICINE

## 2019-11-20 PROCEDURE — 3008F PR BODY MASS INDEX (BMI) DOCUMENTED: ICD-10-PCS | Mod: CPTII,S$GLB,, | Performed by: INTERNAL MEDICINE

## 2019-11-20 PROCEDURE — 3074F SYST BP LT 130 MM HG: CPT | Mod: CPTII,S$GLB,, | Performed by: INTERNAL MEDICINE

## 2019-11-20 PROCEDURE — 3008F BODY MASS INDEX DOCD: CPT | Mod: CPTII,S$GLB,, | Performed by: INTERNAL MEDICINE

## 2019-11-20 PROCEDURE — 99214 OFFICE O/P EST MOD 30 MIN: CPT | Mod: S$GLB,,, | Performed by: INTERNAL MEDICINE

## 2019-11-20 PROCEDURE — 3078F PR MOST RECENT DIASTOLIC BLOOD PRESSURE < 80 MM HG: ICD-10-PCS | Mod: CPTII,S$GLB,, | Performed by: INTERNAL MEDICINE

## 2019-11-20 PROCEDURE — 99999 PR PBB SHADOW E&M-EST. PATIENT-LVL III: CPT | Mod: PBBFAC,,, | Performed by: INTERNAL MEDICINE

## 2019-11-20 PROCEDURE — 3074F PR MOST RECENT SYSTOLIC BLOOD PRESSURE < 130 MM HG: ICD-10-PCS | Mod: CPTII,S$GLB,, | Performed by: INTERNAL MEDICINE

## 2019-11-20 RX ORDER — ERGOCALCIFEROL 1.25 MG/1
CAPSULE ORAL
COMMUNITY
Start: 2014-12-18 | End: 2021-04-27

## 2019-11-20 NOTE — PROGRESS NOTES
"Subjective:       Patient ID: Alondra Bryan is a 59 y.o. female.    Chief Complaint: j lusi mass f/u    59 year old with a history of a RLL mass in the setting of CT evidence of remote histo infection by calcified granuloma of lung and spleen.  Here for final follow up for lung mass which was biopsied and found to be scar.  At time of dx, patient had no respiratory symptoms other than a cough.      Review of Systems   Respiratory: Negative for hemoptysis.    Gastrointestinal: Negative for acid reflux.       Objective:       Vitals:    11/20/19 1137   BP: 128/78   BP Location: Left arm   Patient Position: Sitting   Pulse: 85   SpO2: 99%   Weight: 73.4 kg (161 lb 13.1 oz)   Height: 5' 4" (1.626 m)     Physical Exam   Constitutional: She appears well-developed and well-nourished.   Cardiovascular: Normal rate and regular rhythm.   Pulmonary/Chest: She has no wheezes.   Musculoskeletal: Normal range of motion.   Psychiatric: She has a normal mood and affect.        Personal Diagnostic Review  CT of chest performed on today as compared to all previous without contrast revealed calcified granulomas and perhaps broncholith in the right lower lobe and spleen.  Area of scar.  Mass has resolved.  Stable 8 mm thyroid nodule..  No flowsheet data found.      Assessment:       1. Thyroid nodule    2. Granulomatous lung disease        Outpatient Encounter Medications as of 11/20/2019   Medication Sig Dispense Refill    ARMOUR THYROID 60 mg Tab 60 mg every morning.   3    aspirin (ECOTRIN) 81 MG EC tablet Take 81 mg by mouth once daily.      cyanocobalamin 1,000 mcg/mL injection 1,000 mcg every 28 days. Takes 1st of every month      doxycycline (ORACEA) 40 mg capsule Take 40 mg by mouth every other day.       esomeprazole (NEXIUM) 40 MG capsule TAKE 1 CAPSULE (40 MG TOTAL) BY MOUTH ONCE DAILY. 30 capsule 2    ibuprofen (ADVIL,MOTRIN) 100 MG tablet Take 100 mg by mouth every 6 (six) hours as needed for Temperature greater than.   "    lisinopril 10 MG tablet TAKE 1 TABLET BY MOUTH ONCE A DAY 30 tablet 12    MIRVASO 0.33 % GlwP       multivitamin with folic acid (ONE DAILY ESSENTIAL) 400 mcg Tab Take 1 tablet by mouth every morning.       progesterone (PROMETRIUM) 100 MG capsule Take 100 mg by mouth once daily.      SOOLANTRA 1 % Crea every other day.       testosterone cypionate (DEPOTESTOTERONE CYPIONATE) 200 mg/mL injection INJECT 0.25 ML INTO THE MUSCLE EVERY 28 DAYS  0    calcium carbonate (CALCIUM ANTACID) 300 mg (750 mg) Chew Take by mouth daily as needed.      ergocalciferol (ERGOCALCIFEROL) 50,000 unit Cap       guaifenesin-codeine 100-10 mg/5 ml (TUSSI-ORGANIDIN NR)  mg/5 mL syrup Take 5 mLs by mouth every 6 (six) hours as needed for Cough. (Patient not taking: Reported on 11/20/2019) 180 mL 0     No facility-administered encounter medications on file as of 11/20/2019.      Orders Placed This Encounter   Procedures    US Thyroid     Standing Status:   Future     Standing Expiration Date:   11/20/2020     Order Specific Question:   May the Radiologist modify the order per protocol to meet the clinical needs of the patient?     Answer:   Yes    Ambulatory referral/consult to Endocrinology     Standing Status:   Future     Standing Expiration Date:   12/20/2020     Referral Priority:   Routine     Referral Type:   Consultation     Requested Specialty:   Endocrinology     Number of Visits Requested:   1     Plan:     Problem List Items Addressed This Visit     Granulomatous lung disease    Overview     Likely the result of remote histo infection.  Lung mass has resolved.         Thyroid nodule - Primary    Overview     PCP to follow up with thyroid u/s         Relevant Orders    US Thyroid    Ambulatory referral/consult to Endocrinology        No pulmonary nodule necessary

## 2019-11-21 ENCOUNTER — TELEPHONE (OUTPATIENT)
Dept: PULMONOLOGY | Facility: CLINIC | Age: 59
End: 2019-11-21

## 2019-11-21 ENCOUNTER — TELEPHONE (OUTPATIENT)
Dept: OPTOMETRY | Facility: CLINIC | Age: 59
End: 2019-11-21

## 2019-11-21 NOTE — TELEPHONE ENCOUNTER
Pt scheduled an appointment with Dr. cedillo for CL fit. I called pt to notify her that we are not able to but I can schedule her with a doctor who can. Pt asked to be seen before the end of the year and the only provider available was Dr. Corley in [place]. I was able to get her seen per her request.

## 2019-11-25 ENCOUNTER — HOSPITAL ENCOUNTER (OUTPATIENT)
Dept: RADIOLOGY | Facility: OTHER | Age: 59
Discharge: HOME | End: 2019-11-25
Attending: FAMILY MEDICINE
Payer: COMMERCIAL

## 2019-11-25 DIAGNOSIS — Z12.39 BREAST CANCER SCREENING: ICD-10-CM

## 2019-11-25 DIAGNOSIS — Z12.31 VISIT FOR SCREENING MAMMOGRAM: ICD-10-CM

## 2019-11-25 PROCEDURE — 77063 MAMMO DIGITAL SCREENING BILAT WITH TOMOSYNTHESIS_CAD: ICD-10-PCS | Mod: 26,,, | Performed by: RADIOLOGY

## 2019-11-25 PROCEDURE — 77067 SCR MAMMO BI INCL CAD: CPT | Mod: 26,,, | Performed by: RADIOLOGY

## 2019-11-25 PROCEDURE — 77067 SCR MAMMO BI INCL CAD: CPT | Mod: TC

## 2019-11-25 PROCEDURE — 77063 BREAST TOMOSYNTHESIS BI: CPT | Mod: 26,,, | Performed by: RADIOLOGY

## 2019-11-25 PROCEDURE — 77067 MAMMO DIGITAL SCREENING BILAT WITH TOMOSYNTHESIS_CAD: ICD-10-PCS | Mod: 26,,, | Performed by: RADIOLOGY

## 2019-11-25 RX ORDER — ESOMEPRAZOLE MAGNESIUM 40 MG/1
40 CAPSULE, DELAYED RELEASE ORAL DAILY
Qty: 30 CAPSULE | Refills: 2 | Status: SHIPPED | OUTPATIENT
Start: 2019-11-25 | End: 2020-04-20

## 2019-11-26 ENCOUNTER — HOSPITAL ENCOUNTER (OUTPATIENT)
Dept: RADIOLOGY | Facility: HOSPITAL | Age: 59
Discharge: HOME | End: 2019-11-26
Attending: INTERNAL MEDICINE
Payer: COMMERCIAL

## 2019-11-26 DIAGNOSIS — E04.1 THYROID NODULE: ICD-10-CM

## 2019-11-26 PROCEDURE — 76536 US THYROID: ICD-10-PCS | Mod: 26,,, | Performed by: RADIOLOGY

## 2019-11-26 PROCEDURE — 76536 US EXAM OF HEAD AND NECK: CPT | Mod: 26,,, | Performed by: RADIOLOGY

## 2019-11-26 PROCEDURE — 76536 US EXAM OF HEAD AND NECK: CPT | Mod: TC,PO

## 2019-12-06 ENCOUNTER — OFFICE VISIT (OUTPATIENT)
Dept: ENDOCRINOLOGY | Facility: CLINIC | Age: 59
End: 2019-12-06
Payer: COMMERCIAL

## 2019-12-06 ENCOUNTER — LAB VISIT (OUTPATIENT)
Dept: LAB | Facility: HOSPITAL | Age: 59
End: 2019-12-06
Attending: INTERNAL MEDICINE
Payer: COMMERCIAL

## 2019-12-06 VITALS
HEART RATE: 74 BPM | DIASTOLIC BLOOD PRESSURE: 88 MMHG | BODY MASS INDEX: 28.04 KG/M2 | HEIGHT: 64 IN | RESPIRATION RATE: 18 BRPM | WEIGHT: 164.25 LBS | SYSTOLIC BLOOD PRESSURE: 136 MMHG

## 2019-12-06 DIAGNOSIS — E04.1 THYROID NODULE: Primary | ICD-10-CM

## 2019-12-06 DIAGNOSIS — Z79.890 HORMONE REPLACEMENT THERAPY (HRT): ICD-10-CM

## 2019-12-06 DIAGNOSIS — E34.9 TESTOSTERONE DEFICIENCY: ICD-10-CM

## 2019-12-06 DIAGNOSIS — E03.8 HYPOTHYROIDISM DUE TO HASHIMOTO'S THYROIDITIS: ICD-10-CM

## 2019-12-06 DIAGNOSIS — E06.3 HYPOTHYROIDISM DUE TO HASHIMOTO'S THYROIDITIS: ICD-10-CM

## 2019-12-06 DIAGNOSIS — E03.9 ACQUIRED HYPOTHYROIDISM: ICD-10-CM

## 2019-12-06 DIAGNOSIS — I10 ESSENTIAL HYPERTENSION: ICD-10-CM

## 2019-12-06 LAB
T4 FREE SERPL-MCNC: 0.81 NG/DL (ref 0.71–1.51)
TSH SERPL DL<=0.005 MIU/L-ACNC: 0.92 UIU/ML (ref 0.4–4)

## 2019-12-06 PROCEDURE — 99204 OFFICE O/P NEW MOD 45 MIN: CPT | Mod: S$GLB,,, | Performed by: INTERNAL MEDICINE

## 2019-12-06 PROCEDURE — 3075F SYST BP GE 130 - 139MM HG: CPT | Mod: CPTII,S$GLB,, | Performed by: INTERNAL MEDICINE

## 2019-12-06 PROCEDURE — 99204 PR OFFICE/OUTPT VISIT, NEW, LEVL IV, 45-59 MIN: ICD-10-PCS | Mod: S$GLB,,, | Performed by: INTERNAL MEDICINE

## 2019-12-06 PROCEDURE — 84443 ASSAY THYROID STIM HORMONE: CPT

## 2019-12-06 PROCEDURE — 3079F PR MOST RECENT DIASTOLIC BLOOD PRESSURE 80-89 MM HG: ICD-10-PCS | Mod: CPTII,S$GLB,, | Performed by: INTERNAL MEDICINE

## 2019-12-06 PROCEDURE — 84439 ASSAY OF FREE THYROXINE: CPT

## 2019-12-06 PROCEDURE — 3008F PR BODY MASS INDEX (BMI) DOCUMENTED: ICD-10-PCS | Mod: CPTII,S$GLB,, | Performed by: INTERNAL MEDICINE

## 2019-12-06 PROCEDURE — 36415 COLL VENOUS BLD VENIPUNCTURE: CPT | Mod: PO

## 2019-12-06 PROCEDURE — 99999 PR PBB SHADOW E&M-EST. PATIENT-LVL III: ICD-10-PCS | Mod: PBBFAC,,, | Performed by: INTERNAL MEDICINE

## 2019-12-06 PROCEDURE — 3079F DIAST BP 80-89 MM HG: CPT | Mod: CPTII,S$GLB,, | Performed by: INTERNAL MEDICINE

## 2019-12-06 PROCEDURE — 99999 PR PBB SHADOW E&M-EST. PATIENT-LVL III: CPT | Mod: PBBFAC,,, | Performed by: INTERNAL MEDICINE

## 2019-12-06 PROCEDURE — 3008F BODY MASS INDEX DOCD: CPT | Mod: CPTII,S$GLB,, | Performed by: INTERNAL MEDICINE

## 2019-12-06 PROCEDURE — 3075F PR MOST RECENT SYSTOLIC BLOOD PRESS GE 130-139MM HG: ICD-10-PCS | Mod: CPTII,S$GLB,, | Performed by: INTERNAL MEDICINE

## 2019-12-06 RX ORDER — CHOLECALCIFEROL (VITAMIN D3) 25 MCG
5000 TABLET ORAL DAILY
COMMUNITY

## 2019-12-06 NOTE — PROGRESS NOTES
Subjective:    Patient ID:  Alondra Bryan is a 59 y.o. female.    Chief Complaint:  Hypothyroidism and Thyroid Nodule      Pt presents for thyroid nodule.    Went to pulm to follow up on calcified granuloma and was found to have thyroid nodule on CT. Thyroid US was done and showed bilateral thyroid nodules. Denies hoarseness, voice changes, dysphagia, or history of XRT exposure. Has known hypothyroidism. Onset was seven years ago. Sees a wellness practioner and has been on [place] thyroid. Currently on 60mg. No symptoms of over or under active thyroid. Takes testo one shot each month unsure of dose since it is given by the wellness clinic. No changes in mood, excess hair growth, deepening of voice. Also on progesterone. S/p hysterectomy several years ago.      Review of Systems   Constitution: Negative for decreased appetite, diaphoresis, malaise/fatigue, night sweats and weight loss.   HENT: Negative for hoarse voice, odynophagia and sore throat.    Eyes: Negative for blurred vision and double vision.   Cardiovascular: Negative for chest pain, dyspnea on exertion, irregular heartbeat, leg swelling, near-syncope and palpitations.   Respiratory: Positive for cough.    Endocrine: Negative for cold intolerance and heat intolerance.   Hematologic/Lymphatic: Negative for adenopathy. Bruises/bleeds easily.   Skin: Negative for dry skin, nail changes and rash.   Musculoskeletal: Negative for muscle cramps and myalgias.   Gastrointestinal: Positive for diarrhea. Negative for abdominal pain, constipation, nausea and vomiting.   Neurological: Negative for dizziness, headaches, light-headedness, numbness and tremors.   Psychiatric/Behavioral: Negative for depression. The patient does not have insomnia and is not nervous/anxious.         Past Medical History:   Diagnosis Date    Arthritis     Asthma     Colon polyp     GERD (gastroesophageal reflux disease)     Herpes simplex without mention of complication      Hypertension     Hypothyroidism     Lung mass     seeing pulmonology      Social History     Tobacco Use    Smoking status: Former Smoker     Packs/day: 1.00     Years: 20.00     Pack years: 20.00     Last attempt to quit: 1984     Years since quittin.9    Smokeless tobacco: Never Used    Tobacco comment: quit 20 yrs ago   Substance Use Topics    Alcohol use: Yes     Alcohol/week: 14.0 - 21.0 standard drinks     Types: 14 - 21 Glasses of wine per week     Comment: drinks wine every night 1-3 glasses a night    Drug use: No     Family History   Problem Relation Age of Onset    Heart attack Father     Heart disease Father     Hypertension Brother     Lymphoma Sister     Hypertension Sister     Cancer Sister     Hypothyroidism Mother     Colon cancer Maternal Grandfather         dx 80    Breast cancer Neg Hx     Diabetes Neg Hx     Eclampsia Neg Hx     Miscarriages / Stillbirths Neg Hx     Ovarian cancer Neg Hx      labor Neg Hx     Stroke Neg Hx     Anesthesia problems Neg Hx     Crohn's disease Neg Hx     Ulcerative colitis Neg Hx     Stomach cancer Neg Hx     Esophageal cancer Neg Hx       Past Surgical History:   Procedure Laterality Date    BREAST BIOPSY Right 2016    needle bx    BREAST BIOPSY Left 2016    needle bx    CHOLECYSTECTOMY N/A     COLONOSCOPY N/A 10/12/2018    Procedure: COLONOSCOPY;  Surgeon: Sam Mckeon MD;  Location: [facility];  Service: Endoscopy;  Laterality: N/A;    COLONOSCOPY  10/12/2018    Dr. Mckeon: 3 colon polyps removed, repeat in 5 years; biopsy HYPERPLASTIC POLYPs    ESOPHAGOGASTRODUODENOSCOPY N/A 7/3/2019    Procedure: EGD (ESOPHAGOGASTRODUODENOSCOPY);  Surgeon: Sam Mckeon MD;  Location: [facility];  Service: Endoscopy;  Laterality: N/A;    HYSTERECTOMY      [facility] for fibroids    LUNG SURGERY  2017    removed right lower lobe, unscuccesful, inflammation    SINUS SURGERY            Current Outpatient Medications:      ARMOUR THYROID 60 mg Tab, 60 mg every morning. , Disp: , Rfl: 3    cyanocobalamin 1,000 mcg/mL injection, 1,000 mcg every 28 days. Takes 1st of every month, Disp: , Rfl:     doxycycline (ORACEA) 40 mg capsule, Take 40 mg by mouth every other day. , Disp: , Rfl:     esomeprazole (NEXIUM) 40 MG capsule, TAKE 1 CAPSULE (40 MG TOTAL) BY MOUTH ONCE DAILY., Disp: 30 capsule, Rfl: 2    ibuprofen (ADVIL,MOTRIN) 100 MG tablet, Take 100 mg by mouth every 6 (six) hours as needed for Temperature greater than., Disp: , Rfl:     lisinopril 10 MG tablet, TAKE 1 TABLET BY MOUTH ONCE A DAY, Disp: 30 tablet, Rfl: 12    MIRVASO 0.33 % GlwP, , Disp: , Rfl:     multivitamin with folic acid (ONE DAILY ESSENTIAL) 400 mcg Tab, Take 1 tablet by mouth every morning. , Disp: , Rfl:     progesterone (PROMETRIUM) 100 MG capsule, Take 100 mg by mouth once daily., Disp: , Rfl:     SOOLANTRA 1 % Crea, every other day. , Disp: , Rfl:     testosterone cypionate (DEPOTESTOTERONE CYPIONATE) 200 mg/mL injection, INJECT 0.25 ML INTO THE MUSCLE EVERY 28 DAYS, Disp: , Rfl: 0    vitamin D (VITAMIN D3) 1000 units Tab, Take 5,000 Units by mouth once daily., Disp: , Rfl:     aspirin (ECOTRIN) 81 MG EC tablet, Take 81 mg by mouth once daily., Disp: , Rfl:     calcium carbonate (CALCIUM ANTACID) 300 mg (750 mg) Chew, Take by mouth daily as needed., Disp: , Rfl:     ergocalciferol (ERGOCALCIFEROL) 50,000 unit Cap, , Disp: , Rfl:     valACYclovir (VALTREX) 500 MG tablet, Take 1 tablet (500 mg total) by mouth 2 (two) times daily. for 5 days, Disp: 30 tablet, Rfl: 1     Review of patient's allergies indicates:   Allergen Reactions    Pcn [penicillins] Hives        Objective:     Vitals:    12/06/19 0906   BP: 136/88   Pulse: 74   Resp: 18        Physical Exam   Constitutional: She is oriented to person, place, and time. She appears well-developed and well-nourished.   HENT:   Head: Normocephalic and atraumatic.   Mouth/Throat: Oropharynx is  clear and moist. Normal dentition.   Eyes: Conjunctivae are normal. No scleral icterus.   Neck: No tracheal deviation present. No thyromegaly present.   No palpable thyroid nodules   Cardiovascular: Normal rate and regular rhythm. Exam reveals no gallop and no friction rub.   No murmur heard.  Pulmonary/Chest: No respiratory distress. She has no wheezes.   Abdominal: Soft. Bowel sounds are normal. She exhibits no distension. There is no tenderness.   Musculoskeletal: She exhibits no edema.   Lymphadenopathy:     She has no cervical adenopathy.   Neurological: She is alert and oriented to person, place, and time. She displays normal reflexes.   No tremor   Skin: Skin is warm and dry. She is not diaphoretic.   Bruise over L wrist and right forearm (from the cat per pt)   Psychiatric: She has a normal mood and affect. Thought content normal.         Lab Results   Component Value Date    TSH 0.393 (L) 01/23/2015    FREET4 0.80 01/23/2015      US Thyroid   Order: [id number]   Status:  Final result   Visible to patient:  Yes (Patient Portal) Next appt:  12/10/2019 at 01:30 PM in Optometry (Emil Corley, OD) Dx:  Thyroid nodule   Details     Reading Physician Reading Date Result Priority   Senthil Montesinos MD 11/26/2019 Routine      Narrative     EXAMINATION:  US THYROID    CLINICAL HISTORY:  Nontoxic single thyroid nodule    TECHNIQUE:  Ultrasound of the thyroid and cervical lymph nodes was performed.    COMPARISON:  CT chest dated 11/15/2019, 08/21/2018    FINDINGS:  The thyroid gland is normal in size.  There is a nodule in each lobe of the gland.  The right lobe measures 4.7 x 1.3 x 1.5 cm with an estimated volume of 4.9 mL.  The thyroid isthmus measures 2 mm in thickness.  The left lobe measures 4.2 x 1.2 x 1.4 cm with an estimated volume of 3.5 mL.  Total thyroid volume is normal at 8.4 mL.    There is a solid hypoechoic 8 x 6 x 7 mm nodule located laterally in the posterior mid to lower pole of the right  lobe.    There is a more complex solid and cystic nodule in the lateral mid to lower pole of the left lobe measuring 1.4 x 1.0 x 1.5 cm.  This hypodense nodule is demonstrated on comparison chest CTs and is without definite change.  This nodule does not quite meet criteria for FNA or biopsy.      Impression       1. The thyroid gland is normal in size.  There is a single nodule in each lobe of the gland.  The subcentimeter nodule in the right lobe does not meet criteria for biopsy.  A complex solid and cystic nodule in the left lobe corresponds to the nodule demonstrated on comparison chest CTs and considering differences in modality, is without definite change.  This nodule does not quite meet criteria for FNA or biopsy at this time.               Assessment:       1. Thyroid nodule    2. Acquired hypothyroidism    3. Essential hypertension    4. Hormone replacement therapy (HRT)    5. Testosterone deficiency         Plan:   Thyroid nodules, new  Pt with bilateral thyroid nodules. Discussed indication for FNA. R sided nodule is < 1cm. L is complex with cystic and solid components with overall size 1.5 cm. Discussed technical challenge with biopsying the solid component given that it is <1cm. Informed pt that the only way to tell for certain if solid portion is cancerous however is via tissue. The ultrasonographic features are more consistent with benign nodules. Pt agrees to hold off on biopsy for now. Will repeat thyroid US in 9 months.  -     Ambulatory referral/consult to Endocrinology  -     US Soft Tissue Head Neck Thyroid; Future; Expected date: 08/06/2020    Acquired hypothyroidism, chronic, controlled  Clinically euthyroid. Continue current dose of [place].  -     TSH; Future; Expected date: 12/06/2019  -     T4, free; Future; Expected date: 12/06/2019    Essential hypertension, chronic, controlled   BP at goal. Continue current meds    Hormone replacement therapy (HRT), chronic, controlled  Symptoms  controlled. Managed by outside doctor.    Testosterone deficiency, chronic, controlled  No clinical signs of excess testo. Managed by outside doctor.    Follow up:  RTC in 9 months with repeat thyroid ultrasound

## 2019-12-06 NOTE — LETTER
December 6, 2019      Garima Llanos MD  [address]  [facility] [address]           [facility]  [address] [place] [address]  Phone: [phone]  Fax: [phone]          Patient: Alondra Bryan   MR Number: [id number]   YOB: 1960   Date of Visit: 12/6/2019       Dear Dr. Garima Llanos:    Thank you for referring Alondra Bryan to me for evaluation. Attached you will find relevant portions of my assessment and plan of care.    If you have questions, please do not hesitate to call me. I look forward to following Alondra Bryan along with you.    Sincerely,    Juliette L. Sandifer Kum-Nji, MD    Enclosure  CC:  No Recipients    If you would like to receive this communication electronically, please contact [email or web address] or [phone] to request more information on [employer] access.    For providers and/or their staff who would like to refer a patient to [facility], please contact us through our one-stop-shop provider referral line, [facility], at [phone].    If you feel you have received this communication in error or would no longer like to receive these types of communications, please e-mail [email or web address]          2 person assist

## 2019-12-10 ENCOUNTER — OFFICE VISIT (OUTPATIENT)
Dept: OPTOMETRY | Facility: CLINIC | Age: 59
End: 2019-12-10
Payer: COMMERCIAL

## 2019-12-10 DIAGNOSIS — Z01.00 EXAMINATION OF EYES AND VISION: Primary | ICD-10-CM

## 2019-12-10 DIAGNOSIS — H52.7 REFRACTIVE ERROR: ICD-10-CM

## 2019-12-10 DIAGNOSIS — H25.13 NUCLEAR SCLEROSIS, BILATERAL: ICD-10-CM

## 2019-12-10 DIAGNOSIS — Z46.0 CONTACT LENS/GLASSES FITTING: Primary | ICD-10-CM

## 2019-12-10 PROCEDURE — 92015 PR REFRACTION: ICD-10-PCS | Mod: S$GLB,,, | Performed by: OPTOMETRIST

## 2019-12-10 PROCEDURE — 92004 PR EYE EXAM, NEW PATIENT,COMPREHESV: ICD-10-PCS | Mod: S$GLB,,, | Performed by: OPTOMETRIST

## 2019-12-10 PROCEDURE — 99999 PR PBB SHADOW E&M-EST. PATIENT-LVL II: CPT | Mod: PBBFAC,,, | Performed by: OPTOMETRIST

## 2019-12-10 PROCEDURE — 99499 UNLISTED E&M SERVICE: CPT | Mod: S$GLB,,, | Performed by: OPTOMETRIST

## 2019-12-10 PROCEDURE — 92310 CONTACT LENS FITTING OU: CPT | Mod: CSM,,, | Performed by: OPTOMETRIST

## 2019-12-10 PROCEDURE — 99499 NO LOS: ICD-10-PCS | Mod: S$GLB,,, | Performed by: OPTOMETRIST

## 2019-12-10 PROCEDURE — 99999 PR PBB SHADOW E&M-EST. PATIENT-LVL II: ICD-10-PCS | Mod: PBBFAC,,, | Performed by: OPTOMETRIST

## 2019-12-10 PROCEDURE — 92004 COMPRE OPH EXAM NEW PT 1/>: CPT | Mod: S$GLB,,, | Performed by: OPTOMETRIST

## 2019-12-10 PROCEDURE — 92015 DETERMINE REFRACTIVE STATE: CPT | Mod: S$GLB,,, | Performed by: OPTOMETRIST

## 2019-12-10 PROCEDURE — 92310 PR CONTACT LENS FITTING (NO CHANGE): ICD-10-PCS | Mod: CSM,,, | Performed by: OPTOMETRIST

## 2019-12-10 NOTE — PROGRESS NOTES
HPI     Annual Exam      Additional comments: DLE x 1 yr (outside [facility])    needs updated specs   & clrx               Blurred Vision      Additional comments: at near only --- distance VA good //  wears OD cls   for distance only w/ OTC readers --- unable to tolerate cls OS          Last edited by Lisa Frederick on 12/10/2019  1:40 PM. (History)            Assessment /Plan     For exam results, see Encounter Report.    Examination of eyes and vision    Nuclear sclerosis, bilateral    Refractive error      Mild NS OU. Discussed possible ocular affects of cataracts. Acceptable BCVA OU. Discussed treatment options. Surgery not recommended at this time. Monitor yearly.     Dispensed updated spectacle Rx. Discussed various spectacle lens options. Discussed adaptation period to new specs.     Discussed cls options, pt occasional wearer. Will trial daily disposable for OD only (distance): 1-Day Acuvue Moist for Astigmatism. Discussed proper wear and care of lenses. Daily wear only, dispose of daily. Do not sleep/swim/shower in lenses. Discontinue CL wear ASAP and RTC if any redness or discomfort occurs.     Mild larger than average CD ratio OS > OD. Low risk, not suspect at this time. IOP within normal range without the use of drops. Neg fam hx. Monitor yearly.       RTC in 1 week for clfu, or sooner prn.

## 2019-12-10 NOTE — PROGRESS NOTES
Assessment /Plan     For exam results, see Encounter Report.    Contact lens/glasses fitting      Patient is here for a comprehensive eye exam and contact lens fit. See other exam visit with same encounter date 12/10/19 for detailed exam information.

## 2019-12-17 ENCOUNTER — OFFICE VISIT (OUTPATIENT)
Dept: OPTOMETRY | Facility: CLINIC | Age: 59
End: 2019-12-17
Payer: COMMERCIAL

## 2019-12-17 DIAGNOSIS — Z46.0 CONTACT LENS/GLASSES FITTING: Primary | ICD-10-CM

## 2019-12-17 PROCEDURE — 99499 UNLISTED E&M SERVICE: CPT | Mod: S$GLB,,, | Performed by: OPTOMETRIST

## 2019-12-17 PROCEDURE — 99499 NO LOS: ICD-10-PCS | Mod: S$GLB,,, | Performed by: OPTOMETRIST

## 2019-12-31 ENCOUNTER — TELEPHONE (OUTPATIENT)
Dept: ENDOCRINOLOGY | Facility: CLINIC | Age: 59
End: 2019-12-31

## 2019-12-31 NOTE — TELEPHONE ENCOUNTER
----- Message from Mary Mcbride sent at 12/31/2019 12:10 PM CST -----  Contact: Patient  Type:  Patient Returning Call    Who Called:Patient  Who Left Message for Patient: Selene  Does the patient know what this is regarding?:  Yes  Best Call Back Number:  [phone]  Additional Information:  Pt is returning a call to office regards lab results. Please call and advise.

## 2019-12-31 NOTE — TELEPHONE ENCOUNTER
Please inform pts of labs:  TSH 0.924 wnls  FT4 0.81 wnls  Continue current dose of [place] thyroid

## 2020-04-20 RX ORDER — ESOMEPRAZOLE MAGNESIUM 40 MG/1
40 CAPSULE, DELAYED RELEASE ORAL DAILY
Qty: 30 CAPSULE | Refills: 2 | Status: SHIPPED | OUTPATIENT
Start: 2020-04-20 | End: 2020-06-30

## 2020-07-28 ENCOUNTER — OFFICE VISIT (OUTPATIENT)
Dept: CARDIOLOGY | Facility: CLINIC | Age: 60
End: 2020-07-28
Payer: COMMERCIAL

## 2020-07-28 VITALS
HEIGHT: 64 IN | SYSTOLIC BLOOD PRESSURE: 142 MMHG | DIASTOLIC BLOOD PRESSURE: 80 MMHG | TEMPERATURE: 98 F | WEIGHT: 162.25 LBS | BODY MASS INDEX: 27.7 KG/M2 | HEART RATE: 75 BPM

## 2020-07-28 DIAGNOSIS — I10 ESSENTIAL HYPERTENSION: Primary | ICD-10-CM

## 2020-07-28 PROCEDURE — 99999 PR PBB SHADOW E&M-EST. PATIENT-LVL IV: ICD-10-PCS | Mod: PBBFAC,,, | Performed by: INTERNAL MEDICINE

## 2020-07-28 PROCEDURE — 99213 PR OFFICE/OUTPT VISIT, EST, LEVL III, 20-29 MIN: ICD-10-PCS | Mod: S$GLB,,, | Performed by: INTERNAL MEDICINE

## 2020-07-28 PROCEDURE — 99213 OFFICE O/P EST LOW 20 MIN: CPT | Mod: S$GLB,,, | Performed by: INTERNAL MEDICINE

## 2020-07-28 PROCEDURE — 3079F PR MOST RECENT DIASTOLIC BLOOD PRESSURE 80-89 MM HG: ICD-10-PCS | Mod: CPTII,S$GLB,, | Performed by: INTERNAL MEDICINE

## 2020-07-28 PROCEDURE — 99999 PR PBB SHADOW E&M-EST. PATIENT-LVL IV: CPT | Mod: PBBFAC,,, | Performed by: INTERNAL MEDICINE

## 2020-07-28 PROCEDURE — 3077F SYST BP >= 140 MM HG: CPT | Mod: CPTII,S$GLB,, | Performed by: INTERNAL MEDICINE

## 2020-07-28 PROCEDURE — 3077F PR MOST RECENT SYSTOLIC BLOOD PRESSURE >= 140 MM HG: ICD-10-PCS | Mod: CPTII,S$GLB,, | Performed by: INTERNAL MEDICINE

## 2020-07-28 PROCEDURE — 3008F PR BODY MASS INDEX (BMI) DOCUMENTED: ICD-10-PCS | Mod: CPTII,S$GLB,, | Performed by: INTERNAL MEDICINE

## 2020-07-28 PROCEDURE — 3008F BODY MASS INDEX DOCD: CPT | Mod: CPTII,S$GLB,, | Performed by: INTERNAL MEDICINE

## 2020-07-28 PROCEDURE — 3079F DIAST BP 80-89 MM HG: CPT | Mod: CPTII,S$GLB,, | Performed by: INTERNAL MEDICINE

## 2020-07-28 NOTE — PROGRESS NOTES
Subjective:    Patient ID:  Alondra Bryan is a 60 y.o. female who presents for follow-up of Hypertension      HPI  She comes with no complaints, no chest pain, no shortness of breath  FC I    Review of Systems   Constitution: Negative for decreased appetite, malaise/fatigue, weight gain and weight loss.   Cardiovascular: Negative for chest pain, dyspnea on exertion, leg swelling, palpitations and syncope.   Respiratory: Negative for cough and shortness of breath.    Gastrointestinal: Negative.    Neurological: Negative for weakness.   All other systems reviewed and are negative.       Objective:      Physical Exam   Constitutional: She is oriented to person, place, and time. She appears well-developed and well-nourished.   HENT:   Head: Normocephalic.   Eyes: Pupils are equal, round, and reactive to light.   Neck: Normal range of motion. Neck supple. No JVD present. Carotid bruit is not present. No thyromegaly present.   Cardiovascular: Normal rate, regular rhythm, normal heart sounds, intact distal pulses and normal pulses. PMI is not displaced. Exam reveals no gallop.   No murmur heard.  Pulmonary/Chest: Effort normal and breath sounds normal.   Abdominal: Soft. Normal appearance. She exhibits no mass. There is no hepatosplenomegaly. There is no abdominal tenderness.   Musculoskeletal: Normal range of motion.         General: No edema.   Neurological: She is alert and oriented to person, place, and time. She has normal strength and normal reflexes. No sensory deficit.   Skin: Skin is warm and intact.   Psychiatric: She has a normal mood and affect.   Nursing note and vitals reviewed.        Assessment:       1. Essential hypertension         Plan:     Continue all cardiac medications  Regular exercise program  Weight loss  Follow up as needed

## 2020-10-06 NOTE — PROGRESS NOTES
HPI     Contact Lens Follow Up      Additional comments: pt happy w/ cls -- OD distance           Last edited by Lisa Freedrick on 12/17/2019  1:09 PM. (History)            Assessment /Plan     For exam results, see Encounter Report.    Contact lens/glasses fitting      Good clfu. Pt happy with comfort and vision. Dispensed CLs Rx for OD only for distance: 1-Day Acuvue Moist for Astigmatism. Discussed proper wear and care of lenses. Daily wear only, dispose of daily. Do not sleep/swim/shower in lenses. Discontinue CL wear ASAP and RTC if any redness or discomfort occurs.                     Negative

## 2020-11-30 ENCOUNTER — TELEPHONE (OUTPATIENT)
Dept: OBSTETRICS AND GYNECOLOGY | Facility: CLINIC | Age: 60
End: 2020-11-30

## 2020-11-30 ENCOUNTER — PATIENT MESSAGE (OUTPATIENT)
Dept: OBSTETRICS AND GYNECOLOGY | Facility: CLINIC | Age: 60
End: 2020-11-30

## 2020-11-30 DIAGNOSIS — Z12.31 BREAST CANCER SCREENING BY MAMMOGRAM: Primary | ICD-10-CM

## 2020-12-14 ENCOUNTER — HOSPITAL ENCOUNTER (OUTPATIENT)
Dept: RADIOLOGY | Facility: OTHER | Age: 60
Discharge: HOME | End: 2020-12-14
Attending: OBSTETRICS & GYNECOLOGY
Payer: COMMERCIAL

## 2020-12-14 ENCOUNTER — OFFICE VISIT (OUTPATIENT)
Dept: OBSTETRICS AND GYNECOLOGY | Facility: CLINIC | Age: 60
End: 2020-12-14
Attending: OBSTETRICS & GYNECOLOGY
Payer: COMMERCIAL

## 2020-12-14 VITALS
DIASTOLIC BLOOD PRESSURE: 80 MMHG | BODY MASS INDEX: 28.19 KG/M2 | HEIGHT: 64 IN | WEIGHT: 165.13 LBS | SYSTOLIC BLOOD PRESSURE: 120 MMHG

## 2020-12-14 DIAGNOSIS — Z01.419 WELL FEMALE EXAM WITH ROUTINE GYNECOLOGICAL EXAM: Primary | ICD-10-CM

## 2020-12-14 DIAGNOSIS — Z12.31 BREAST CANCER SCREENING BY MAMMOGRAM: ICD-10-CM

## 2020-12-14 PROCEDURE — 3008F BODY MASS INDEX DOCD: CPT | Mod: CPTII,S$GLB,, | Performed by: OBSTETRICS & GYNECOLOGY

## 2020-12-14 PROCEDURE — 99396 PREV VISIT EST AGE 40-64: CPT | Mod: S$GLB,,, | Performed by: OBSTETRICS & GYNECOLOGY

## 2020-12-14 PROCEDURE — 99999 PR PBB SHADOW E&M-EST. PATIENT-LVL III: CPT | Mod: PBBFAC,,, | Performed by: OBSTETRICS & GYNECOLOGY

## 2020-12-14 PROCEDURE — 77067 SCR MAMMO BI INCL CAD: CPT | Mod: 26,,, | Performed by: RADIOLOGY

## 2020-12-14 PROCEDURE — 3079F DIAST BP 80-89 MM HG: CPT | Mod: CPTII,S$GLB,, | Performed by: OBSTETRICS & GYNECOLOGY

## 2020-12-14 PROCEDURE — 77067 MAMMO DIGITAL SCREENING BILAT WITH TOMO: ICD-10-PCS | Mod: 26,,, | Performed by: RADIOLOGY

## 2020-12-14 PROCEDURE — 3079F PR MOST RECENT DIASTOLIC BLOOD PRESSURE 80-89 MM HG: ICD-10-PCS | Mod: CPTII,S$GLB,, | Performed by: OBSTETRICS & GYNECOLOGY

## 2020-12-14 PROCEDURE — 3074F PR MOST RECENT SYSTOLIC BLOOD PRESSURE < 130 MM HG: ICD-10-PCS | Mod: CPTII,S$GLB,, | Performed by: OBSTETRICS & GYNECOLOGY

## 2020-12-14 PROCEDURE — 1126F AMNT PAIN NOTED NONE PRSNT: CPT | Mod: S$GLB,,, | Performed by: OBSTETRICS & GYNECOLOGY

## 2020-12-14 PROCEDURE — 3008F PR BODY MASS INDEX (BMI) DOCUMENTED: ICD-10-PCS | Mod: CPTII,S$GLB,, | Performed by: OBSTETRICS & GYNECOLOGY

## 2020-12-14 PROCEDURE — 77063 BREAST TOMOSYNTHESIS BI: CPT | Mod: 26,,, | Performed by: RADIOLOGY

## 2020-12-14 PROCEDURE — 77063 MAMMO DIGITAL SCREENING BILAT WITH TOMO: ICD-10-PCS | Mod: 26,,, | Performed by: RADIOLOGY

## 2020-12-14 PROCEDURE — 99396 PR PREVENTIVE VISIT,EST,40-64: ICD-10-PCS | Mod: S$GLB,,, | Performed by: OBSTETRICS & GYNECOLOGY

## 2020-12-14 PROCEDURE — 3074F SYST BP LT 130 MM HG: CPT | Mod: CPTII,S$GLB,, | Performed by: OBSTETRICS & GYNECOLOGY

## 2020-12-14 PROCEDURE — 99999 PR PBB SHADOW E&M-EST. PATIENT-LVL III: ICD-10-PCS | Mod: PBBFAC,,, | Performed by: OBSTETRICS & GYNECOLOGY

## 2020-12-14 PROCEDURE — 1126F PR PAIN SEVERITY QUANTIFIED, NO PAIN PRESENT: ICD-10-PCS | Mod: S$GLB,,, | Performed by: OBSTETRICS & GYNECOLOGY

## 2020-12-14 PROCEDURE — 77067 SCR MAMMO BI INCL CAD: CPT | Mod: TC

## 2020-12-14 NOTE — PROGRESS NOTES
SUBJECTIVE:   60 y.o. female   for routine gyn exam. No LMP recorded. Patient has had a hysterectomy..  She has no unusual complaints.  Takes bio-identical HRT from an outside provider.         Past Medical History:   Diagnosis Date    Arthritis     Asthma     Colon polyp     GERD (gastroesophageal reflux disease)     Herpes simplex without mention of complication     Hypertension     Hypothyroidism     Lung mass     seeing pulmonology     Past Surgical History:   Procedure Laterality Date    BREAST BIOPSY Right     needle bx    BREAST BIOPSY Left     needle bx    CHOLECYSTECTOMY N/A     COLONOSCOPY N/A 10/12/2018    Procedure: COLONOSCOPY;  Surgeon: Sam Mckeon MD;  Location: [facility];  Service: Endoscopy;  Laterality: N/A;    COLONOSCOPY  10/12/2018    Dr. Mckeon: 3 colon polyps removed, repeat in 5 years; biopsy HYPERPLASTIC POLYPs    ESOPHAGOGASTRODUODENOSCOPY N/A 7/3/2019    Procedure: EGD (ESOPHAGOGASTRODUODENOSCOPY);  Surgeon: Sam Mckeon MD;  Location: [facility];  Service: Endoscopy;  Laterality: N/A;    HYSTERECTOMY      [facility] for fibroids    LUNG SURGERY  2017    removed right lower lobe, unscuccesful, inflammation    SINUS SURGERY       Social History     Socioeconomic History    Marital status: Single     Spouse name: Not on file    Number of children: 1    Years of education: Not on file    Highest education level: Not on file   Occupational History    Occupation: retired     Employer: [employer] DEPARTMENT   Social Needs    Financial resource strain: Not on file    Food insecurity     Worry: Not on file     Inability: Not on file    Transportation needs     Medical: Not on file     Non-medical: Not on file   Tobacco Use    Smoking status: Former Smoker     Packs/day: 1.00     Years: 20.00     Pack years: 20.00     Quit date: 1984     Years since quittin.9    Smokeless tobacco: Former User    Tobacco comment: quit 20 yrs  ago   Substance and Sexual Activity    Alcohol use: Yes     Alcohol/week: 14.0 - 21.0 standard drinks     Types: 14 - 21 Glasses of wine per week     Comment: social    Drug use: No    Sexual activity: Yes     Partners: Male     Birth control/protection: Surgical, Other-see comments     Comment: Hysterectomy   Lifestyle    Physical activity     Days per week: Not on file     Minutes per session: Not on file    Stress: Not on file   Relationships    Social connections     Talks on phone: Not on file     Gets together: Not on file     Attends Jehovah's witness service: Not on file     Active member of club or organization: Not on file     Attends meetings of clubs or organizations: Not on file     Relationship status: Not on file   Other Topics Concern    Not on file   Social History Narrative    Patient is a [employer] worker.     Family History   Problem Relation Age of Onset    Heart attack Father     Heart disease Father     Hypertension Brother     Lymphoma Sister     Hypertension Sister     Cancer Sister     Hypothyroidism Mother     Colon cancer Maternal Grandfather         dx 80    Breast cancer Neg Hx     Diabetes Neg Hx     Eclampsia Neg Hx     Miscarriages / Stillbirths Neg Hx     Ovarian cancer Neg Hx      labor Neg Hx     Stroke Neg Hx     Anesthesia problems Neg Hx     Crohn's disease Neg Hx     Ulcerative colitis Neg Hx     Stomach cancer Neg Hx     Esophageal cancer Neg Hx      OB History    Para Term  AB Living   2 1 1   1 1   SAB TAB Ectopic Multiple Live Births           1      # Outcome Date GA Lbr Daniel/2nd Weight Sex Delivery Anes PTL Lv   2 AB            1 Term      Vag-Spont   STACEY         Current Outpatient Medications   Medication Sig Dispense Refill    ARMOUR THYROID 60 mg Tab 60 mg every morning.   3    aspirin (ECOTRIN) 81 MG EC tablet Take 81 mg by mouth once daily.      calcium carbonate (CALCIUM ANTACID) 300 mg (750 mg) Chew Take by mouth daily as  needed.      cyanocobalamin 1,000 mcg/mL injection 1,000 mcg every 28 days. Takes 1st of every month      doxycycline (ORACEA) 40 mg capsule Take 40 mg by mouth every other day.       ergocalciferol (ERGOCALCIFEROL) 50,000 unit Cap       esomeprazole (NEXIUM) 40 MG capsule TAKE 1 CAPSULE (40 MG TOTAL) BY MOUTH ONCE DAILY. 30 capsule 2    ibuprofen (ADVIL,MOTRIN) 100 MG tablet Take 100 mg by mouth every 6 (six) hours as needed for Temperature greater than.      lisinopril 10 MG tablet TAKE 1 TABLET BY MOUTH ONCE A DAY 30 tablet 12    meloxicam (MOBIC) 15 MG tablet TAKE 1 TABLET BY MOUTH ONCE A DAY 30 tablet 13    MIRVASO 0.33 % GlwP       multivitamin with folic acid (ONE DAILY ESSENTIAL) 400 mcg Tab Take 1 tablet by mouth every morning.       progesterone (PROMETRIUM) 100 MG capsule Take 100 mg by mouth once daily.      SOOLANTRA 1 % Crea every other day.       testosterone cypionate (DEPOTESTOTERONE CYPIONATE) 200 mg/mL injection INJECT 0.25 ML INTO THE MUSCLE EVERY 28 DAYS  0    vitamin D (VITAMIN D3) 1000 units Tab Take 5,000 Units by mouth once daily.      valACYclovir (VALTREX) 500 MG tablet Take 1 tablet (500 mg total) by mouth 2 (two) times daily. for 5 days 30 tablet 1     No current facility-administered medications for this visit.      Allergies: Pcn [penicillins]     ROS:  Constitutional: no weight loss, weight gain, fever, fatigue  Eyes:  No vision changes, glasses/contacts  ENT/Mouth: No ulcers, sinus problems, ears ringing, headache  Cardiovascular: No inability to lie flat, chest pain, exercise intolerance, swelling, heart palpitations  Respiratory: No wheezing, coughing blood, shortness of breath, or cough  Gastrointestinal: No diarrhea, bloody stool, nausea/vomiting, constipation, gas, hemorrhoids  Genitourinary: No blood in urine, painful urination, urgency of urination, frequency of urination, incomplete emptying, incontinence, abnormal bleeding, painful periods, heavy periods,  "vaginal discharge, vaginal odor, painful intercourse, sexual problems, bleeding after intercourse.  Musculoskeletal: No muscle weakness  Skin/Breast: No painful breasts, nipple discharge, masses, rash, ulcers  Neurological: No passing out, seizures, numbness, headache  Endocrine: No diabetes, hypothyroid, hyperthyroid, hot flashes, hair loss, abnormal hair growth, acne  Psychiatric: No depression, crying  Hematologic: No bruises, bleeding, swollen lymph nodes, anemia.      OBJECTIVE:   The patient appears well, alert, oriented x 3, in no distress.  /80 (BP Location: Right arm, Patient Position: Sitting, BP Method: Large (Manual))   Ht 5' 4" (1.626 m)   Wt 74.9 kg (165 lb 2 oz)   BMI 28.34 kg/m²   NECK: no thyromegaly, trachea midline  SKIN: no acne, striae, hirsutism  CHEST: CTAB  CV: RRR  BREAST EXAM: breasts appear normal, no suspicious masses, no skin or nipple changes or axillary nodes  ABDOMEN: no hernias, masses, or hepatosplenomegaly  GENITALIA: normal external genitalia, no erythema, no discharge  URETHRA: normal urethra, normal urethral meatus  VAGINA: Normal  CERVIX: absent  UTERUS: absent  ADNEXA: no mass, fullness, tenderness      ASSESSMENT:   1. Well female exam with routine gynecological exam         PLAN:      Discussed MMG today, reassured normal breast exam, contact clinic if worsening sx, healthy lifestyle including regular exercise, healthy eating, etc.  Return to clinic in 1 year    "

## 2021-04-29 ENCOUNTER — PATIENT MESSAGE (OUTPATIENT)
Dept: RESEARCH | Facility: HOSPITAL | Age: 61
End: 2021-04-29

## 2021-09-07 DIAGNOSIS — U07.1 COVID-19 VIRUS INFECTION: Primary | ICD-10-CM

## 2021-09-08 ENCOUNTER — NURSE TRIAGE (OUTPATIENT)
Dept: ADMINISTRATIVE | Facility: CLINIC | Age: 61
End: 2021-09-08

## 2021-09-08 ENCOUNTER — PATIENT MESSAGE (OUTPATIENT)
Dept: ADMINISTRATIVE | Facility: OTHER | Age: 61
End: 2021-09-08

## 2021-09-08 ENCOUNTER — INFUSION (OUTPATIENT)
Dept: INFECTIOUS DISEASES | Facility: HOSPITAL | Age: 61
End: 2021-09-08
Attending: INTERNAL MEDICINE
Payer: OTHER GOVERNMENT

## 2021-09-08 ENCOUNTER — PATIENT MESSAGE (OUTPATIENT)
Dept: ADMINISTRATIVE | Facility: CLINIC | Age: 61
End: 2021-09-08

## 2021-09-08 VITALS
RESPIRATION RATE: 18 BRPM | HEART RATE: 75 BPM | TEMPERATURE: 97 F | DIASTOLIC BLOOD PRESSURE: 82 MMHG | SYSTOLIC BLOOD PRESSURE: 165 MMHG | OXYGEN SATURATION: 98 %

## 2021-09-08 DIAGNOSIS — U07.1 COVID-19 VIRUS INFECTION: ICD-10-CM

## 2021-09-08 PROCEDURE — 63600175 PHARM REV CODE 636 W HCPCS: Performed by: INTERNAL MEDICINE

## 2021-09-08 PROCEDURE — M0243 CASIRIVI AND IMDEVI INFUSION: HCPCS | Performed by: INTERNAL MEDICINE

## 2021-09-08 PROCEDURE — 25000003 PHARM REV CODE 250: Performed by: INTERNAL MEDICINE

## 2021-09-08 RX ORDER — SODIUM CHLORIDE 0.9 % (FLUSH) 0.9 %
10 SYRINGE (ML) INJECTION
Status: ACTIVE | OUTPATIENT
Start: 2021-09-08

## 2021-09-08 RX ORDER — DIPHENHYDRAMINE HYDROCHLORIDE 50 MG/ML
25 INJECTION INTRAMUSCULAR; INTRAVENOUS ONCE AS NEEDED
Status: ACTIVE | OUTPATIENT
Start: 2021-09-08 | End: 2033-02-04

## 2021-09-08 RX ORDER — ONDANSETRON 4 MG/1
4 TABLET, ORALLY DISINTEGRATING ORAL ONCE AS NEEDED
Status: DISPENSED | OUTPATIENT
Start: 2021-09-08 | End: 2033-02-04

## 2021-09-08 RX ORDER — EPINEPHRINE 0.3 MG/.3ML
0.3 INJECTION SUBCUTANEOUS
Status: ACTIVE | OUTPATIENT
Start: 2021-09-08

## 2021-09-08 RX ORDER — ALBUTEROL SULFATE 90 UG/1
2 AEROSOL, METERED RESPIRATORY (INHALATION)
Status: ACTIVE | OUTPATIENT
Start: 2021-09-08

## 2021-09-08 RX ORDER — ACETAMINOPHEN 325 MG/1
650 TABLET ORAL ONCE AS NEEDED
Status: ACTIVE | OUTPATIENT
Start: 2021-09-08 | End: 2033-02-04

## 2021-09-08 RX ADMIN — CASIRIVIMAB AND IMDEVIMAB 600 MG: 600; 600 INJECTION, SOLUTION, CONCENTRATE INTRAVENOUS at 10:09

## 2021-09-09 ENCOUNTER — PATIENT MESSAGE (OUTPATIENT)
Dept: ADMINISTRATIVE | Facility: OTHER | Age: 61
End: 2021-09-09

## 2021-09-10 ENCOUNTER — NURSE TRIAGE (OUTPATIENT)
Dept: ADMINISTRATIVE | Facility: CLINIC | Age: 61
End: 2021-09-10

## 2021-09-10 ENCOUNTER — PATIENT MESSAGE (OUTPATIENT)
Dept: ADMINISTRATIVE | Facility: OTHER | Age: 61
End: 2021-09-10

## 2021-09-11 ENCOUNTER — NURSE TRIAGE (OUTPATIENT)
Dept: ADMINISTRATIVE | Facility: CLINIC | Age: 61
End: 2021-09-11

## 2021-09-11 ENCOUNTER — PATIENT MESSAGE (OUTPATIENT)
Dept: ADMINISTRATIVE | Facility: OTHER | Age: 61
End: 2021-09-11

## 2021-09-12 ENCOUNTER — PATIENT MESSAGE (OUTPATIENT)
Dept: ADMINISTRATIVE | Facility: OTHER | Age: 61
End: 2021-09-12

## 2021-09-13 ENCOUNTER — NURSE TRIAGE (OUTPATIENT)
Dept: ADMINISTRATIVE | Facility: CLINIC | Age: 61
End: 2021-09-13

## 2021-09-13 ENCOUNTER — PATIENT MESSAGE (OUTPATIENT)
Dept: ADMINISTRATIVE | Facility: OTHER | Age: 61
End: 2021-09-13

## 2022-06-04 ENCOUNTER — OFFICE VISIT (OUTPATIENT)
Dept: FAMILY MEDICINE | Facility: CLINIC | Age: 62
End: 2022-06-04
Payer: COMMERCIAL

## 2022-06-04 VITALS
SYSTOLIC BLOOD PRESSURE: 134 MMHG | HEIGHT: 64 IN | DIASTOLIC BLOOD PRESSURE: 86 MMHG | HEART RATE: 72 BPM | BODY MASS INDEX: 27.01 KG/M2 | WEIGHT: 158.19 LBS

## 2022-06-04 DIAGNOSIS — Z11.59 ENCOUNTER FOR HEPATITIS C SCREENING TEST FOR LOW RISK PATIENT: ICD-10-CM

## 2022-06-04 DIAGNOSIS — I10 PRIMARY HYPERTENSION: Primary | ICD-10-CM

## 2022-06-04 DIAGNOSIS — Z11.4 SCREENING FOR HIV WITHOUT PRESENCE OF RISK FACTORS: ICD-10-CM

## 2022-06-04 DIAGNOSIS — E04.1 THYROID NODULE: ICD-10-CM

## 2022-06-04 PROCEDURE — 3008F PR BODY MASS INDEX (BMI) DOCUMENTED: ICD-10-PCS | Mod: CPTII,S$GLB,, | Performed by: PHYSICIAN ASSISTANT

## 2022-06-04 PROCEDURE — 3008F BODY MASS INDEX DOCD: CPT | Mod: CPTII,S$GLB,, | Performed by: PHYSICIAN ASSISTANT

## 2022-06-04 PROCEDURE — 99203 PR OFFICE/OUTPT VISIT, NEW, LEVL III, 30-44 MIN: ICD-10-PCS | Mod: S$GLB,,, | Performed by: PHYSICIAN ASSISTANT

## 2022-06-04 PROCEDURE — 3079F DIAST BP 80-89 MM HG: CPT | Mod: CPTII,S$GLB,, | Performed by: PHYSICIAN ASSISTANT

## 2022-06-04 PROCEDURE — 4010F ACE/ARB THERAPY RXD/TAKEN: CPT | Mod: CPTII,S$GLB,, | Performed by: PHYSICIAN ASSISTANT

## 2022-06-04 PROCEDURE — 1159F PR MEDICATION LIST DOCUMENTED IN MEDICAL RECORD: ICD-10-PCS | Mod: CPTII,S$GLB,, | Performed by: PHYSICIAN ASSISTANT

## 2022-06-04 PROCEDURE — 99203 OFFICE O/P NEW LOW 30 MIN: CPT | Mod: S$GLB,,, | Performed by: PHYSICIAN ASSISTANT

## 2022-06-04 PROCEDURE — 4010F PR ACE/ARB THEARPY RXD/TAKEN: ICD-10-PCS | Mod: CPTII,S$GLB,, | Performed by: PHYSICIAN ASSISTANT

## 2022-06-04 PROCEDURE — 3079F PR MOST RECENT DIASTOLIC BLOOD PRESSURE 80-89 MM HG: ICD-10-PCS | Mod: CPTII,S$GLB,, | Performed by: PHYSICIAN ASSISTANT

## 2022-06-04 PROCEDURE — 3075F SYST BP GE 130 - 139MM HG: CPT | Mod: CPTII,S$GLB,, | Performed by: PHYSICIAN ASSISTANT

## 2022-06-04 PROCEDURE — 1159F MED LIST DOCD IN RCRD: CPT | Mod: CPTII,S$GLB,, | Performed by: PHYSICIAN ASSISTANT

## 2022-06-04 PROCEDURE — 3075F PR MOST RECENT SYSTOLIC BLOOD PRESS GE 130-139MM HG: ICD-10-PCS | Mod: CPTII,S$GLB,, | Performed by: PHYSICIAN ASSISTANT

## 2022-06-04 RX ORDER — ESOMEPRAZOLE MAGNESIUM 40 MG/1
40 CAPSULE, DELAYED RELEASE ORAL DAILY PRN
COMMUNITY
End: 2022-06-04

## 2022-06-04 RX ORDER — LISINOPRIL 10 MG/1
10 TABLET ORAL DAILY
Qty: 90 TABLET | Refills: 3 | Status: SHIPPED | OUTPATIENT
Start: 2022-06-04 | End: 2023-09-01

## 2022-06-04 NOTE — PROGRESS NOTES
Subjective:       Patient ID: Alondra Bryan is a 62 y.o. female.    Chief Complaint: Establish Care    Patient is a 61 yo female who is coming in to establish care with me. She needs a refill on her medication, but otherwise no acute complaints.     Patient Active Problem List  Diagnosis   Hypertension   Family history of early CAD   Hallux abducto valgus   Neuroma   Granulomatous lung disease   Acid reflux   Mild intermittent asthma without complication   Over weight   PONV (postoperative nausea and vomiting)   Hormone replacement therapy (HRT)   Atypical chest pain   Lung nodule   Thyroid nodule   Renal angiomyolipoma   Hx of colonic polyps   Epigastric pain    Followed by GYN     Past Medical History:  Diagnosis Date   Arthritis    Asthma    Colon polyp    GERD (gastroesophageal reflux disease)    Herpes simplex without mention of complication    Hypertension    Hypothyroidism    Lung mass    seeing pulmonology      Past Surgical History:  Procedure Laterality Date   BREAST BIOPSY Right 2016   needle bx   BREAST BIOPSY Left 2016   needle bx   CHOLECYSTECTOMY N/A    COLONOSCOPY N/A 10/12/2018   Procedure: COLONOSCOPY;  Surgeon: Sam Mckeon MD;  Location: [facility];  Service: Endoscopy;  Laterality: N/A;   COLONOSCOPY  10/12/2018   Dr. Mckeon: 3 colon polyps removed, repeat in 5 years; biopsy HYPERPLASTIC POLYPs   ESOPHAGOGASTRODUODENOSCOPY N/A 7/3/2019   Procedure: EGD (ESOPHAGOGASTRODUODENOSCOPY);  Surgeon: Sam Mckeon MD;  Location: [facility];  Service: Endoscopy;  Laterality: N/A;   HYSTERECTOMY  2010   [facility] for fibroids   LUNG SURGERY  06/2017   removed right lower lobe, unscuccesful, inflammation   SINUS SURGERY        Family History  Problem Relation Age of Onset   Heart attack Father    Heart disease Father    Hypertension Brother    Lymphoma Sister    Hypertension Sister    Cancer Sister    Hypothyroidism Mother    Colon cancer Maternal  Grandfather        dx 80   Breast cancer Neg Hx    Diabetes Neg Hx    Eclampsia Neg Hx    Miscarriages / Stillbirths Neg Hx    Ovarian cancer Neg Hx     labor Neg Hx    Stroke Neg Hx    Anesthesia problems Neg Hx    Crohn's disease Neg Hx    Ulcerative colitis Neg Hx    Stomach cancer Neg Hx    Esophageal cancer Neg Hx       Social History    Socioeconomic History   Marital status: Single   Number of children: 1  Occupational History   Occupation: retired    Employer: [employer]  Tobacco Use   Smoking status: Former Smoker    Packs/day: 1.00    Years: 20.00    Pack years: 20.00    Quit date: 1984    Years since quittin.4   Smokeless tobacco: Former User   Tobacco comment: quit 20 yrs ago  Substance and Sexual Activity   Alcohol use: Yes    Alcohol/week: 14.0 - 21.0 standard drinks    Types: 14 - 21 Glasses of wine per week    Comment: social   Drug use: No   Sexual activity: Yes    Partners: Male    Birth control/protection: Surgical, Other-see comments    Comment: Hysterectomy  Social History Narrative   Patient is a [employer] worker.      Review of patient's allergies indicates:  Allergen Reactions   Pcn (penicillins) Hives        Current Outpatient Medications:     ARMOUR THYROID 60 mg Tab, 60 mg every morning. , Disp: , Rfl: 3    calcium carbonate (TUMS) 300 mg (750 mg) Chew, Take by mouth daily as needed., Disp: , Rfl:     cyanocobalamin 1,000 mcg/mL injection, 1,000 mcg every 28 days. Takes 1st of every month, Disp: , Rfl:     doxycycline (ORACEA) 40 mg capsule, Take 40 mg by mouth daily as needed (rosacea). , Disp: , Rfl:     ibuprofen (ADVIL,MOTRIN) 100 MG tablet, Take 100 mg by mouth every 6 (six) hours as needed for Temperature greater than., Disp: , Rfl:     meloxicam (MOBIC) 15 MG tablet, TAKE 1 TABLET BY MOUTH ONCE A DAY, Disp: 30 tablet, Rfl: 13    multivitamin with folic acid 400 mcg Tab, Take 1 tablet by mouth every morning. , Disp: , Rfl:      "progesterone (PROMETRIUM) 100 MG capsule, Take 100 mg by mouth once daily., Disp: , Rfl:     SOOLANTRA 1 % Crea, every other day. , Disp: , Rfl:     testosterone cypionate (DEPOTESTOTERONE CYPIONATE) 200 mg/mL injection, INJECT 0.25 ML INTO THE MUSCLE EVERY 28 DAYS, Disp: , Rfl: 0    vitamin D (VITAMIN D3) 1000 units Tab, Take 5,000 Units by mouth once daily., Disp: , Rfl:     lisinopriL 10 MG tablet, Take 1 tablet (10 mg total) by mouth once daily., Disp: 90 tablet, Rfl: 3    Current Facility-Administered Medications:     acetaminophen tablet 650 mg, 650 mg, Oral, Once PRN, Henry Holloway MD    albuterol inhaler 2 puff, 2 puff, Inhalation, Q20 Min PRN, Henry Holloway MD    diphenhydrAMINE injection 25 mg, 25 mg, Intravenous, Once PRN, Henry Holloway MD    EPINEPHrine (EPIPEN) 0.3 mg/0.3 mL pen injection 0.3 mg, 0.3 mg, Intramuscular, PRN, Henry Holloway MD    methylPREDNISolone sodium succinate injection 40 mg, 40 mg, Intravenous, Once PRN, Henry Holloway MD    ondansetron disintegrating tablet 4 mg, 4 mg, Oral, Once PRN, Henry Holloway MD    sodium chloride 0.9% 500 mL flush bag, , Intravenous, PRN, Henry Holloway MD    sodium chloride 0.9% flush 10 mL, 10 mL, Intravenous, PRN, Henry Holloway MD    /86 (BP Location: Left arm, Patient Position: Sitting, BP Method: Medium (Manual))   Pulse 72   Ht 5' 4" (1.626 m)   Wt 71.7 kg (158 lb 2.9 oz)   BMI 27.15 kg/m²     Review of Systems   Constitutional: Negative for activity change, appetite change, fatigue and fever.   HENT: Negative.    Eyes: Negative.    Respiratory: Negative for cough, chest tightness, shortness of breath and stridor.    Cardiovascular: Negative for chest pain, palpitations and leg swelling.   Gastrointestinal: Negative for abdominal pain, blood in stool, nausea, rectal pain and reflux.   Endocrine: Negative.    Genitourinary: Negative.  Negative for flank pain, hematuria and urgency.   Musculoskeletal: Negative for arthralgias, " gait problem, joint swelling and neck pain.   Integumentary:  Negative.   Allergic/Immunologic: Negative.    Neurological: Negative for dizziness, tremors, seizures, headaches and memory loss.   Hematological: Negative.    Psychiatric/Behavioral: Negative.          Objective:      Physical Exam  Vitals reviewed.   Constitutional:       General: She is not in acute distress.     Appearance: Normal appearance. She is normal weight. She is not ill-appearing, toxic-appearing or diaphoretic.   HENT:      Head: Normocephalic and atraumatic.      Right Ear: Tympanic membrane, ear canal and external ear normal. There is no impacted cerumen.      Left Ear: Tympanic membrane, ear canal and external ear normal. There is no impacted cerumen.      Nose: Nose normal.      Mouth/Throat:      Pharynx: No oropharyngeal exudate or posterior oropharyngeal erythema.   Eyes:      Conjunctiva/sclera: Conjunctivae normal.      Pupils: Pupils are equal, round, and reactive to light.   Neck:      Vascular: No carotid bruit.   Cardiovascular:      Rate and Rhythm: Normal rate and regular rhythm.      Pulses: Normal pulses.      Heart sounds: Normal heart sounds. No murmur heard.    No friction rub. No gallop.   Pulmonary:      Effort: Pulmonary effort is normal. No respiratory distress.      Breath sounds: Normal breath sounds. No stridor. No wheezing, rhonchi or rales.   Chest:      Chest wall: No tenderness.   Abdominal:      General: Abdomen is flat. There is no distension.      Palpations: Abdomen is soft. There is no mass.      Tenderness: There is no abdominal tenderness. There is no right CVA tenderness, left CVA tenderness, guarding or rebound.      Hernia: No hernia is present.   Musculoskeletal:         General: No swelling, tenderness, deformity or signs of injury. Normal range of motion.      Cervical back: No rigidity or tenderness.      Right lower leg: No edema.      Left lower leg: No edema.   Lymphadenopathy:      Cervical:  No cervical adenopathy.   Skin:     General: Skin is warm and dry.   Neurological:      General: No focal deficit present.      Mental Status: She is alert.   Psychiatric:         Mood and Affect: Mood normal.         Assessment:       Problem List Items Addressed This Visit     Thyroid nodule    Relevant Orders    TSH    Hypertension - Primary    Relevant Medications    lisinopriL 10 MG tablet    Other Relevant Orders    Comprehensive Metabolic Panel    Lipid Panel    TSH    CBC Auto Differential      Other Visit Diagnoses     Screening for HIV without presence of risk factors        Relevant Orders    HIV 1/2 Ag/Ab (4th Gen)    Encounter for hepatitis C screening test for low risk patient        Relevant Orders    Hepatitis C Antibody          Plan:       Primary hypertension  -     lisinopriL 10 MG tablet; Take 1 tablet (10 mg total) by mouth once daily.  Dispense: 90 tablet; Refill: 3  -     Comprehensive Metabolic Panel; Future; Expected date: 06/04/2022  -     Lipid Panel; Future; Expected date: 06/04/2022  -     TSH; Future; Expected date: 06/04/2022  -     CBC Auto Differential; Future; Expected date: 06/04/2022    Thyroid nodule  -     TSH; Future; Expected date: 06/04/2022    Screening for HIV without presence of risk factors  -     HIV 1/2 Ag/Ab (4th Gen); Future; Expected date: 06/04/2022    Encounter for hepatitis C screening test for low risk patient  -     Hepatitis C Antibody; Future; Expected date: 06/04/2022

## 2022-06-06 ENCOUNTER — LAB VISIT (OUTPATIENT)
Dept: FAMILY MEDICINE | Facility: CLINIC | Age: 62
End: 2022-06-06
Payer: COMMERCIAL

## 2022-06-06 DIAGNOSIS — I10 PRIMARY HYPERTENSION: ICD-10-CM

## 2022-06-06 DIAGNOSIS — Z11.59 ENCOUNTER FOR HEPATITIS C SCREENING TEST FOR LOW RISK PATIENT: ICD-10-CM

## 2022-06-06 DIAGNOSIS — Z11.4 SCREENING FOR HIV WITHOUT PRESENCE OF RISK FACTORS: ICD-10-CM

## 2022-06-06 DIAGNOSIS — E04.1 THYROID NODULE: ICD-10-CM

## 2022-06-06 LAB
ALBUMIN SERPL BCP-MCNC: 3.9 G/DL (ref 3.5–5.2)
ALP SERPL-CCNC: 57 U/L (ref 55–135)
ALT SERPL W/O P-5'-P-CCNC: 19 U/L (ref 10–44)
ANION GAP SERPL CALC-SCNC: 12 MMOL/L (ref 8–16)
AST SERPL-CCNC: 17 U/L (ref 10–40)
BASOPHILS # BLD AUTO: 0.08 K/UL (ref 0–0.2)
BASOPHILS NFR BLD: 1.3 % (ref 0–1.9)
BILIRUB SERPL-MCNC: 0.5 MG/DL (ref 0.1–1)
BUN SERPL-MCNC: 13 MG/DL (ref 8–23)
CALCIUM SERPL-MCNC: 9.1 MG/DL (ref 8.7–10.5)
CHLORIDE SERPL-SCNC: 106 MMOL/L (ref 95–110)
CHOLEST SERPL-MCNC: 227 MG/DL (ref 120–199)
CHOLEST/HDLC SERPL: 4.1 {RATIO} (ref 2–5)
CO2 SERPL-SCNC: 22 MMOL/L (ref 23–29)
CREAT SERPL-MCNC: 0.9 MG/DL (ref 0.5–1.4)
DIFFERENTIAL METHOD: NORMAL
EOSINOPHIL # BLD AUTO: 0.4 K/UL (ref 0–0.5)
EOSINOPHIL NFR BLD: 6.2 % (ref 0–8)
ERYTHROCYTE [DISTWIDTH] IN BLOOD BY AUTOMATED COUNT: 12.2 % (ref 11.5–14.5)
EST. GFR  (AFRICAN AMERICAN): >60 ML/MIN/1.73 M^2
EST. GFR  (NON AFRICAN AMERICAN): >60 ML/MIN/1.73 M^2
GLUCOSE SERPL-MCNC: 106 MG/DL (ref 70–110)
HCT VFR BLD AUTO: 42.4 % (ref 37–48.5)
HDLC SERPL-MCNC: 56 MG/DL (ref 40–75)
HDLC SERPL: 24.7 % (ref 20–50)
HGB BLD-MCNC: 13.9 G/DL (ref 12–16)
IMM GRANULOCYTES # BLD AUTO: 0.02 K/UL (ref 0–0.04)
IMM GRANULOCYTES NFR BLD AUTO: 0.3 % (ref 0–0.5)
LDLC SERPL CALC-MCNC: 148.6 MG/DL (ref 63–159)
LYMPHOCYTES # BLD AUTO: 1.9 K/UL (ref 1–4.8)
LYMPHOCYTES NFR BLD: 30.4 % (ref 18–48)
MCH RBC QN AUTO: 30.5 PG (ref 27–31)
MCHC RBC AUTO-ENTMCNC: 32.8 G/DL (ref 32–36)
MCV RBC AUTO: 93 FL (ref 82–98)
MONOCYTES # BLD AUTO: 0.6 K/UL (ref 0.3–1)
MONOCYTES NFR BLD: 9.1 % (ref 4–15)
NEUTROPHILS # BLD AUTO: 3.3 K/UL (ref 1.8–7.7)
NEUTROPHILS NFR BLD: 52.7 % (ref 38–73)
NONHDLC SERPL-MCNC: 171 MG/DL
NRBC BLD-RTO: 0 /100 WBC
PLATELET # BLD AUTO: 245 K/UL (ref 150–450)
PMV BLD AUTO: 10.6 FL (ref 9.2–12.9)
POTASSIUM SERPL-SCNC: 4.8 MMOL/L (ref 3.5–5.1)
PROT SERPL-MCNC: 6.6 G/DL (ref 6–8.4)
RBC # BLD AUTO: 4.56 M/UL (ref 4–5.4)
SODIUM SERPL-SCNC: 140 MMOL/L (ref 136–145)
TRIGL SERPL-MCNC: 112 MG/DL (ref 30–150)
TSH SERPL DL<=0.005 MIU/L-ACNC: 1.41 UIU/ML (ref 0.4–4)
WBC # BLD AUTO: 6.29 K/UL (ref 3.9–12.7)

## 2022-06-06 PROCEDURE — 80061 LIPID PANEL: CPT | Performed by: PHYSICIAN ASSISTANT

## 2022-06-06 PROCEDURE — 36415 COLL VENOUS BLD VENIPUNCTURE: CPT | Mod: S$GLB,,, | Performed by: PHYSICIAN ASSISTANT

## 2022-06-06 PROCEDURE — 80053 COMPREHEN METABOLIC PANEL: CPT | Performed by: PHYSICIAN ASSISTANT

## 2022-06-06 PROCEDURE — 87389 HIV-1 AG W/HIV-1&-2 AB AG IA: CPT | Performed by: PHYSICIAN ASSISTANT

## 2022-06-06 PROCEDURE — 85025 COMPLETE CBC W/AUTO DIFF WBC: CPT | Performed by: PHYSICIAN ASSISTANT

## 2022-06-06 PROCEDURE — 86803 HEPATITIS C AB TEST: CPT | Performed by: PHYSICIAN ASSISTANT

## 2022-06-06 PROCEDURE — 84443 ASSAY THYROID STIM HORMONE: CPT | Performed by: PHYSICIAN ASSISTANT

## 2022-06-06 PROCEDURE — 36415 COLL VENOUS BLD VENIPUNCTURE: CPT | Performed by: PHYSICIAN ASSISTANT

## 2022-06-06 PROCEDURE — 36415 PR COLLECTION VENOUS BLOOD,VENIPUNCTURE: ICD-10-PCS | Mod: S$GLB,,, | Performed by: PHYSICIAN ASSISTANT

## 2022-06-07 ENCOUNTER — TELEPHONE (OUTPATIENT)
Dept: FAMILY MEDICINE | Facility: CLINIC | Age: 62
End: 2022-06-07
Payer: COMMERCIAL

## 2022-06-07 LAB
HCV AB SERPL QL IA: NEGATIVE
HIV 1+2 AB+HIV1 P24 AG SERPL QL IA: NEGATIVE

## 2022-06-07 NOTE — TELEPHONE ENCOUNTER
----- Message from Tom Miles III, PA-C sent at 6/7/2022  8:21 AM CDT -----  Alondra Bryan  I reviewed your lab results. The cholesterol is a little high, but otherwise everything looks ok. I recommend to focus on improving lifestyle choices to reduce your cholesterol some.

## 2022-06-10 ENCOUNTER — OFFICE VISIT (OUTPATIENT)
Dept: FAMILY MEDICINE | Facility: CLINIC | Age: 62
End: 2022-06-10
Payer: COMMERCIAL

## 2022-06-10 VITALS
HEART RATE: 64 BPM | BODY MASS INDEX: 27.23 KG/M2 | HEIGHT: 64 IN | WEIGHT: 159.5 LBS | DIASTOLIC BLOOD PRESSURE: 86 MMHG | SYSTOLIC BLOOD PRESSURE: 110 MMHG

## 2022-06-10 DIAGNOSIS — L23.7 POISON IVY DERMATITIS: Primary | ICD-10-CM

## 2022-06-10 PROCEDURE — 96372 PR INJECTION,THERAP/PROPH/DIAG2ST, IM OR SUBCUT: ICD-10-PCS | Mod: S$GLB,,, | Performed by: NURSE PRACTITIONER

## 2022-06-10 PROCEDURE — 99213 OFFICE O/P EST LOW 20 MIN: CPT | Mod: 25,S$GLB,, | Performed by: NURSE PRACTITIONER

## 2022-06-10 PROCEDURE — 1160F RVW MEDS BY RX/DR IN RCRD: CPT | Mod: CPTII,S$GLB,, | Performed by: NURSE PRACTITIONER

## 2022-06-10 PROCEDURE — 4010F ACE/ARB THERAPY RXD/TAKEN: CPT | Mod: CPTII,S$GLB,, | Performed by: NURSE PRACTITIONER

## 2022-06-10 PROCEDURE — 1159F PR MEDICATION LIST DOCUMENTED IN MEDICAL RECORD: ICD-10-PCS | Mod: CPTII,S$GLB,, | Performed by: NURSE PRACTITIONER

## 2022-06-10 PROCEDURE — 99213 PR OFFICE/OUTPT VISIT, EST, LEVL III, 20-29 MIN: ICD-10-PCS | Mod: 25,S$GLB,, | Performed by: NURSE PRACTITIONER

## 2022-06-10 PROCEDURE — 96372 THER/PROPH/DIAG INJ SC/IM: CPT | Mod: S$GLB,,, | Performed by: NURSE PRACTITIONER

## 2022-06-10 PROCEDURE — 4010F PR ACE/ARB THEARPY RXD/TAKEN: ICD-10-PCS | Mod: CPTII,S$GLB,, | Performed by: NURSE PRACTITIONER

## 2022-06-10 PROCEDURE — 1159F MED LIST DOCD IN RCRD: CPT | Mod: CPTII,S$GLB,, | Performed by: NURSE PRACTITIONER

## 2022-06-10 PROCEDURE — 3079F DIAST BP 80-89 MM HG: CPT | Mod: CPTII,S$GLB,, | Performed by: NURSE PRACTITIONER

## 2022-06-10 PROCEDURE — 3008F BODY MASS INDEX DOCD: CPT | Mod: CPTII,S$GLB,, | Performed by: NURSE PRACTITIONER

## 2022-06-10 PROCEDURE — 3008F PR BODY MASS INDEX (BMI) DOCUMENTED: ICD-10-PCS | Mod: CPTII,S$GLB,, | Performed by: NURSE PRACTITIONER

## 2022-06-10 PROCEDURE — 1160F PR REVIEW ALL MEDS BY PRESCRIBER/CLIN PHARMACIST DOCUMENTED: ICD-10-PCS | Mod: CPTII,S$GLB,, | Performed by: NURSE PRACTITIONER

## 2022-06-10 PROCEDURE — 3074F SYST BP LT 130 MM HG: CPT | Mod: CPTII,S$GLB,, | Performed by: NURSE PRACTITIONER

## 2022-06-10 PROCEDURE — 3074F PR MOST RECENT SYSTOLIC BLOOD PRESSURE < 130 MM HG: ICD-10-PCS | Mod: CPTII,S$GLB,, | Performed by: NURSE PRACTITIONER

## 2022-06-10 PROCEDURE — 3079F PR MOST RECENT DIASTOLIC BLOOD PRESSURE 80-89 MM HG: ICD-10-PCS | Mod: CPTII,S$GLB,, | Performed by: NURSE PRACTITIONER

## 2022-06-10 RX ORDER — METHYLPREDNISOLONE 4 MG/1
TABLET ORAL
Qty: 1 EACH | Refills: 0 | Status: SHIPPED | OUTPATIENT
Start: 2022-06-10 | End: 2023-09-14

## 2022-06-10 RX ORDER — ESTRADIOL CYPIONATE 5 MG/ML
INJECTION INTRAMUSCULAR
COMMUNITY
Start: 2022-06-09

## 2022-06-10 RX ORDER — METHYLPREDNISOLONE ACETATE 40 MG/ML
40 INJECTION, SUSPENSION INTRA-ARTICULAR; INTRALESIONAL; INTRAMUSCULAR; SOFT TISSUE
Status: COMPLETED | OUTPATIENT
Start: 2022-06-10 | End: 2022-06-10

## 2022-06-10 RX ADMIN — METHYLPREDNISOLONE ACETATE 40 MG: 40 INJECTION, SUSPENSION INTRA-ARTICULAR; INTRALESIONAL; INTRAMUSCULAR; SOFT TISSUE at 01:06

## 2022-06-10 NOTE — PROGRESS NOTES
Alondra Bryan is a 62 y.o. female patient of Tom Miles III, PA-C who presents to the clinic today for for an in-clinic visit..    HPI    Pt, who is not known to me, reports a new problem to me:  rash    Rash  Patient presents for evaluation of a rash involving the left abdomen.. Rash started 2 days ago. Lesions are pink, and raised in texture. Rash has changed (over time. Rash is pruritic. Associated symptoms: none. Patient denies: other symptoms.. Patient has had contacts with similar rash. Patient has had new exposures (soaps, lotions, laundry detergents, foods, medications, plants, insects or animals)--poison ivy in her yard.  She uses gloves but then puts her finger in the waist of her pants to pull them up.    These symptoms began about 2 days ago  and status is worse.     Pt denies the following symptoms:  fever.    Medications tried are flonase    Pertinent medical history:  h/o sinus infections.    ROS    Constitutional:  negative Fever.    Skin:  See chief complaint/HPI.    HEENT:  neg for symptoms.    All other ROS neg.       Past Medical History:   Diagnosis Date    Arthritis     Asthma     Colon polyp     GERD (gastroesophageal reflux disease)     Herpes simplex without mention of complication     Hypertension     Hypothyroidism     Lung mass     seeing pulmonology       Current Outpatient Medications:     ARMOUR THYROID 60 mg Tab, 60 mg every morning. , Disp: , Rfl: 3    calcium carbonate (TUMS) 300 mg (750 mg) Chew, Take by mouth daily as needed., Disp: , Rfl:     cyanocobalamin 1,000 mcg/mL injection, 1,000 mcg every 28 days. Takes 1st of every month, Disp: , Rfl:     DEPO-ESTRADIOL 5 mg/mL injection, every 21 days., Disp: , Rfl:     doxycycline (ORACEA) 40 mg capsule, Take 40 mg by mouth daily as needed (rosacea). , Disp: , Rfl:     ibuprofen (ADVIL,MOTRIN) 100 MG tablet, Take 100 mg by mouth every 6 (six) hours as needed for Temperature greater than., Disp: , Rfl:     lisinopriL 10  "MG tablet, Take 1 tablet (10 mg total) by mouth once daily., Disp: 90 tablet, Rfl: 3    meloxicam (MOBIC) 15 MG tablet, TAKE 1 TABLET BY MOUTH ONCE A DAY, Disp: 30 tablet, Rfl: 13    multivitamin with folic acid 400 mcg Tab, Take 1 tablet by mouth every morning. , Disp: , Rfl:     progesterone (PROMETRIUM) 100 MG capsule, Take 100 mg by mouth once daily., Disp: , Rfl:     SOOLANTRA 1 % Crea, every other day. , Disp: , Rfl:     testosterone cypionate (DEPOTESTOTERONE CYPIONATE) 200 mg/mL injection, INJECT 0.25 ML INTO THE MUSCLE EVERY 28 DAYS, Disp: , Rfl: 0    vitamin D (VITAMIN D3) 1000 units Tab, Take 5,000 Units by mouth once daily., Disp: , Rfl:     Current Facility-Administered Medications:     acetaminophen tablet 650 mg, 650 mg, Oral, Once PRN, Henry Holloway MD    albuterol inhaler 2 puff, 2 puff, Inhalation, Q20 Min PRN, Henry Holloway MD    diphenhydrAMINE injection 25 mg, 25 mg, Intravenous, Once PRN, Henry Holloway MD    EPINEPHrine (EPIPEN) 0.3 mg/0.3 mL pen injection 0.3 mg, 0.3 mg, Intramuscular, PRN, Henry Holloway MD    methylPREDNISolone sodium succinate injection 40 mg, 40 mg, Intravenous, Once PRN, Henry Holloway MD    ondansetron disintegrating tablet 4 mg, 4 mg, Oral, Once PRN, Henry Holloway MD    sodium chloride 0.9% 500 mL flush bag, , Intravenous, PRN, Henry Holloway MD    sodium chloride 0.9% flush 10 mL, 10 mL, Intravenous, PRN, Henry Holloway MD    Patient has never been a smoker.    PHYSICAL EXAM    Alert, coop 62 y.o. female patient in no apparent distress, is not ill-appearing.    Vitals:    06/10/22 1317   BP: 110/86   BP Location: Left arm   Patient Position: Sitting   BP Method: Medium (Manual)   Pulse: 64   Weight: 72.3 kg (159 lb 8 oz)   Height: 5' 4" (1.626 m)     VS reviewed.  VS Stable.  CC, nursing note, medications & PMH all reviewed today.    Head:  Normocephalic, atraumatic.    EENT:  Ext nose/ears without lesions or erythema..               Eye lid without " erythema or lesions, conjunctivae not injected.     Resp:  Respirations even unlabored. and Lungs CTA bilat.      CV: Heart regular rate. and Regular rate and rhythm.  No MRG.      MS:  Ambulates independently, steady gait.NEURO:  Alert and oriented x 4.  Does respond appropriately during the visit.                  Sensation to light touch intact over affected area and symmetrical side to side. .    Skin:  Warm, dry, color good.                         RASH:  A/an erythematous and vesicular is located on abdomen, affecting an area approx. 5 cm by 2.0 cm and a smaller one 3 x 1.5 cm.            Groups of vesicular lesions in oval distribution on the left lat abd.            Psych:  Responds appropriately throughout the visit.               Relaxed.  Well-groomed     Assessment:      ALLERGIC CONTACT DERMATITIS - SECONDARY TO Poison Ivy  Poison ivy dermatitis  -     methylPREDNISolone acetate injection 40 mg  -     methylPREDNISolone (MEDROL DOSEPACK) 4 mg tablet; use as directed  Dispense: 1 each; Refill: 0          Pt today presents with   Chief Complaint   Patient presents with    Rash     Left side     for 1 week(s) that is worse.  positive h/o same.  Treatment so far has not led to improvement.    This is a new problem to me.  no work up is planned.        Pt advised to perform comfort measures recommended on patient instruction sheet, which were reviewed at the time of the visit..    If not better in 3 days , the patient is advised to call here, PCP office or go for an in-person/follow up evaluation.  If worse or concerns, the patient is advised to call for advice to this office or the PCP office or call [facility] or go to the nearest URGENT CARE or ER.  Explained exam findings, diagnosis and treatment plan to patient.  Questions answered and patient states understanding.

## 2022-06-10 NOTE — PATIENT INSTRUCTIONS
Steroid injection and tablets given today.    Avoid poison ivy.  Consider using IvaRest or other soap and showering after gardening.    If you are not better in 3 days, if worse or you have concerns or questions, please do not hesitate to call.  If you have any questions, please do not hesitate to call.  You can reach us at [phone] Monday through Friday 7 a.m. to 6:30 p.m., Saturday 9 a.m. to 4:30 p.m. and Sunday 9 a.m to 2:30 p.m.  Or call  MAREN Miles    Thank you for using the [facility]!    MÓNICA Funez, CNP, FNP-BC  [facility]    To rate your experience with GILA Funez, click on the link below:      [email or web address]

## 2022-09-21 ENCOUNTER — OFFICE VISIT (OUTPATIENT)
Dept: FAMILY MEDICINE | Facility: CLINIC | Age: 62
End: 2022-09-21
Payer: COMMERCIAL

## 2022-09-21 VITALS
RESPIRATION RATE: 18 BRPM | SYSTOLIC BLOOD PRESSURE: 129 MMHG | WEIGHT: 160.69 LBS | OXYGEN SATURATION: 98 % | HEART RATE: 91 BPM | TEMPERATURE: 99 F | DIASTOLIC BLOOD PRESSURE: 79 MMHG | BODY MASS INDEX: 27.59 KG/M2

## 2022-09-21 DIAGNOSIS — G43.809 OTHER MIGRAINE WITHOUT STATUS MIGRAINOSUS, NOT INTRACTABLE: Primary | ICD-10-CM

## 2022-09-21 LAB
CREAT SERPL-MCNC: 1 MG/DL (ref 0.5–1.4)
EST. GFR  (NO RACE VARIABLE): >60 ML/MIN/1.73 M^2

## 2022-09-21 PROCEDURE — 36415 PR COLLECTION VENOUS BLOOD,VENIPUNCTURE: ICD-10-PCS | Mod: S$GLB,,, | Performed by: INTERNAL MEDICINE

## 2022-09-21 PROCEDURE — 3008F PR BODY MASS INDEX (BMI) DOCUMENTED: ICD-10-PCS | Mod: CPTII,S$GLB,, | Performed by: PHYSICIAN ASSISTANT

## 2022-09-21 PROCEDURE — 3074F SYST BP LT 130 MM HG: CPT | Mod: CPTII,S$GLB,, | Performed by: PHYSICIAN ASSISTANT

## 2022-09-21 PROCEDURE — 3074F PR MOST RECENT SYSTOLIC BLOOD PRESSURE < 130 MM HG: ICD-10-PCS | Mod: CPTII,S$GLB,, | Performed by: PHYSICIAN ASSISTANT

## 2022-09-21 PROCEDURE — 3078F PR MOST RECENT DIASTOLIC BLOOD PRESSURE < 80 MM HG: ICD-10-PCS | Mod: CPTII,S$GLB,, | Performed by: PHYSICIAN ASSISTANT

## 2022-09-21 PROCEDURE — 3008F BODY MASS INDEX DOCD: CPT | Mod: CPTII,S$GLB,, | Performed by: PHYSICIAN ASSISTANT

## 2022-09-21 PROCEDURE — 4010F PR ACE/ARB THEARPY RXD/TAKEN: ICD-10-PCS | Mod: CPTII,S$GLB,, | Performed by: PHYSICIAN ASSISTANT

## 2022-09-21 PROCEDURE — 4010F ACE/ARB THERAPY RXD/TAKEN: CPT | Mod: CPTII,S$GLB,, | Performed by: PHYSICIAN ASSISTANT

## 2022-09-21 PROCEDURE — 1159F MED LIST DOCD IN RCRD: CPT | Mod: CPTII,S$GLB,, | Performed by: PHYSICIAN ASSISTANT

## 2022-09-21 PROCEDURE — 1160F RVW MEDS BY RX/DR IN RCRD: CPT | Mod: CPTII,S$GLB,, | Performed by: PHYSICIAN ASSISTANT

## 2022-09-21 PROCEDURE — 82565 ASSAY OF CREATININE: CPT | Performed by: PHYSICIAN ASSISTANT

## 2022-09-21 PROCEDURE — 36415 COLL VENOUS BLD VENIPUNCTURE: CPT | Mod: S$GLB,,, | Performed by: INTERNAL MEDICINE

## 2022-09-21 PROCEDURE — 1160F PR REVIEW ALL MEDS BY PRESCRIBER/CLIN PHARMACIST DOCUMENTED: ICD-10-PCS | Mod: CPTII,S$GLB,, | Performed by: PHYSICIAN ASSISTANT

## 2022-09-21 PROCEDURE — 1159F PR MEDICATION LIST DOCUMENTED IN MEDICAL RECORD: ICD-10-PCS | Mod: CPTII,S$GLB,, | Performed by: PHYSICIAN ASSISTANT

## 2022-09-21 PROCEDURE — 99214 PR OFFICE/OUTPT VISIT, EST, LEVL IV, 30-39 MIN: ICD-10-PCS | Mod: S$GLB,,, | Performed by: PHYSICIAN ASSISTANT

## 2022-09-21 PROCEDURE — 99214 OFFICE O/P EST MOD 30 MIN: CPT | Mod: S$GLB,,, | Performed by: PHYSICIAN ASSISTANT

## 2022-09-21 PROCEDURE — 3078F DIAST BP <80 MM HG: CPT | Mod: CPTII,S$GLB,, | Performed by: PHYSICIAN ASSISTANT

## 2022-09-21 NOTE — PROGRESS NOTES
Subjective:       Patient ID: Alondra Bryan is a 62 y.o. female.    Chief Complaint: Headache    HPI  Review of Systems      Objective:      Physical Exam    Assessment:       Problem List Items Addressed This Visit    None  Visit Diagnoses       Other migraine without status migrainosus, not intractable    -  Primary    Relevant Orders    MRI Brain W WO Contrast    Creatinine, serum              Plan:       ***

## 2022-09-21 NOTE — PROGRESS NOTES
Subjective:       Patient ID: Alondra Bryan is a 62 y.o. female.    Chief Complaint: Headache    Patient is a 63 yo female who report a history of new onset post-coital headache.     Headache   This is a new problem. The current episode started 1 to 4 weeks ago. The problem occurs intermittently. The problem has been waxing and waning. The pain is located in the Occipital region. The pain quality is not similar to prior headaches. The quality of the pain is described as throbbing and shooting. The pain is moderate. Associated symptoms include dizziness. Pertinent negatives include no abdominal pain, blurred vision, fever, hearing loss, phonophobia, photophobia, seizures or vomiting. She has tried nothing for the symptoms. Her past medical history is significant for migraine headaches and migraines in the family.   Review of Systems   Constitutional:  Negative for activity change, appetite change, fatigue and fever.   HENT:  Negative for nasal congestion, hearing loss and postnasal drip.    Eyes:  Negative for blurred vision and photophobia.   Respiratory:  Negative for chest tightness and shortness of breath.    Cardiovascular:  Negative for chest pain.   Gastrointestinal:  Negative for abdominal pain and vomiting.   Neurological:  Positive for dizziness and headaches. Negative for seizures.       Past Medical History:   Diagnosis Date    Arthritis     Asthma     Colon polyp     GERD (gastroesophageal reflux disease)     Herpes simplex without mention of complication     Hypertension     Hypothyroidism     Lung mass     seeing pulmonology       Objective:      Physical Exam  Constitutional:       General: She is not in acute distress.     Appearance: Normal appearance. She is not ill-appearing, toxic-appearing or diaphoretic.   HENT:      Head: Normocephalic and atraumatic.      Right Ear: Tympanic membrane, ear canal and external ear normal.      Left Ear: Tympanic membrane, ear canal and external ear normal.       Nose: Nose normal. No congestion or rhinorrhea.   Cardiovascular:      Rate and Rhythm: Normal rate and regular rhythm.      Heart sounds: No murmur heard.    No friction rub. No gallop.   Pulmonary:      Effort: Pulmonary effort is normal. No respiratory distress.      Breath sounds: Normal breath sounds. No stridor. No wheezing, rhonchi or rales.   Chest:      Chest wall: No tenderness.   Abdominal:      General: There is no distension.      Palpations: There is no mass.      Tenderness: There is no abdominal tenderness. There is no right CVA tenderness, left CVA tenderness, guarding or rebound.      Hernia: No hernia is present.   Musculoskeletal:         General: No swelling or tenderness. Normal range of motion.   Skin:     General: Skin is warm and dry.   Neurological:      General: No focal deficit present.      Mental Status: She is alert.   Psychiatric:         Mood and Affect: Mood normal.       Assessment:       Problem List Items Addressed This Visit    None  Visit Diagnoses       Other migraine without status migrainosus, not intractable    -  Primary    Relevant Orders    MRI Brain W WO Contrast    Creatinine, serum              Plan:       Other migraine without status migrainosus, not intractable  -     MRI Brain W WO Contrast; Future; Expected date: 09/21/2022  -     Creatinine, serum; Future; Expected date: 09/21/2022         Recommend taking an NSAID one hour prior to expected intercourse    I spent 30 minutes on this encounter, time includes face-to-face, chart review, documentation, test review and orders.  .

## 2022-10-05 DIAGNOSIS — Z12.31 OTHER SCREENING MAMMOGRAM: ICD-10-CM

## 2022-10-07 ENCOUNTER — HOSPITAL ENCOUNTER (OUTPATIENT)
Dept: RADIOLOGY | Facility: HOSPITAL | Age: 62
Discharge: HOME | End: 2022-10-07
Attending: PHYSICIAN ASSISTANT
Payer: COMMERCIAL

## 2022-10-07 DIAGNOSIS — G43.809 OTHER MIGRAINE WITHOUT STATUS MIGRAINOSUS, NOT INTRACTABLE: ICD-10-CM

## 2022-10-07 PROCEDURE — 70553 MRI BRAIN STEM W/O & W/DYE: CPT | Mod: TC,PO

## 2022-10-07 PROCEDURE — A9585 GADOBUTROL INJECTION: HCPCS | Mod: PO | Performed by: PHYSICIAN ASSISTANT

## 2022-10-07 PROCEDURE — 70553 MRI BRAIN W WO CONTRAST: ICD-10-PCS | Mod: 26,,, | Performed by: RADIOLOGY

## 2022-10-07 PROCEDURE — 25500020 PHARM REV CODE 255: Mod: PO | Performed by: PHYSICIAN ASSISTANT

## 2022-10-07 PROCEDURE — 70553 MRI BRAIN STEM W/O & W/DYE: CPT | Mod: 26,,, | Performed by: RADIOLOGY

## 2022-10-07 RX ORDER — GADOBUTROL 604.72 MG/ML
7 INJECTION INTRAVENOUS
Status: COMPLETED | OUTPATIENT
Start: 2022-10-07 | End: 2022-10-07

## 2022-10-07 RX ADMIN — GADOBUTROL 7 ML: 604.72 INJECTION INTRAVENOUS at 01:10

## 2022-10-10 ENCOUNTER — PATIENT MESSAGE (OUTPATIENT)
Dept: ADMINISTRATIVE | Facility: HOSPITAL | Age: 62
End: 2022-10-10
Payer: COMMERCIAL

## 2023-01-17 ENCOUNTER — PATIENT MESSAGE (OUTPATIENT)
Dept: ADMINISTRATIVE | Facility: HOSPITAL | Age: 63
End: 2023-01-17
Payer: COMMERCIAL

## 2023-04-27 ENCOUNTER — PATIENT MESSAGE (OUTPATIENT)
Dept: FAMILY MEDICINE | Facility: CLINIC | Age: 63
End: 2023-04-27
Payer: COMMERCIAL

## 2023-04-27 RX ORDER — MELOXICAM 15 MG/1
15 TABLET ORAL DAILY
Qty: 90 TABLET | Refills: 1 | Status: SHIPPED | OUTPATIENT
Start: 2023-04-27 | End: 2023-10-12

## 2023-08-31 DIAGNOSIS — I10 PRIMARY HYPERTENSION: ICD-10-CM

## 2023-09-01 RX ORDER — LISINOPRIL 10 MG/1
10 TABLET ORAL DAILY
Qty: 90 TABLET | Refills: 3 | Status: SHIPPED | OUTPATIENT
Start: 2023-09-01

## 2023-09-14 ENCOUNTER — OFFICE VISIT (OUTPATIENT)
Dept: PRIMARY CARE CLINIC | Facility: CLINIC | Age: 63
End: 2023-09-14
Payer: COMMERCIAL

## 2023-09-14 VITALS
OXYGEN SATURATION: 98 % | WEIGHT: 156.94 LBS | BODY MASS INDEX: 26.79 KG/M2 | HEART RATE: 67 BPM | DIASTOLIC BLOOD PRESSURE: 78 MMHG | SYSTOLIC BLOOD PRESSURE: 134 MMHG | TEMPERATURE: 98 F | HEIGHT: 64 IN

## 2023-09-14 DIAGNOSIS — R19.7 DIARRHEA, UNSPECIFIED TYPE: Primary | ICD-10-CM

## 2023-09-14 DIAGNOSIS — Z00.00 WELLNESS EXAMINATION: ICD-10-CM

## 2023-09-14 LAB
ALBUMIN SERPL BCP-MCNC: 3.9 G/DL (ref 3.5–5.2)
ALP SERPL-CCNC: 141 U/L (ref 55–135)
ALT SERPL W/O P-5'-P-CCNC: 312 U/L (ref 10–44)
AMYLASE SERPL-CCNC: 68 U/L (ref 20–110)
ANION GAP SERPL CALC-SCNC: 9 MMOL/L (ref 8–16)
AST SERPL-CCNC: 303 U/L (ref 10–40)
BASOPHILS # BLD AUTO: 0.07 K/UL (ref 0–0.2)
BASOPHILS NFR BLD: 0.7 % (ref 0–1.9)
BILIRUB SERPL-MCNC: 0.3 MG/DL (ref 0.1–1)
BUN SERPL-MCNC: 10 MG/DL (ref 8–23)
CALCIUM SERPL-MCNC: 9.5 MG/DL (ref 8.7–10.5)
CHLORIDE SERPL-SCNC: 104 MMOL/L (ref 95–110)
CO2 SERPL-SCNC: 27 MMOL/L (ref 23–29)
CREAT SERPL-MCNC: 0.8 MG/DL (ref 0.5–1.4)
DIFFERENTIAL METHOD: ABNORMAL
EOSINOPHIL # BLD AUTO: 2.6 K/UL (ref 0–0.5)
EOSINOPHIL NFR BLD: 25.2 % (ref 0–8)
ERYTHROCYTE [DISTWIDTH] IN BLOOD BY AUTOMATED COUNT: 12.6 % (ref 11.5–14.5)
EST. GFR  (NO RACE VARIABLE): >60 ML/MIN/1.73 M^2
ESTIMATED AVG GLUCOSE: 114 MG/DL (ref 68–131)
GLUCOSE SERPL-MCNC: 104 MG/DL (ref 70–110)
HBA1C MFR BLD: 5.6 % (ref 4–5.6)
HCT VFR BLD AUTO: 41.3 % (ref 37–48.5)
HGB BLD-MCNC: 13.6 G/DL (ref 12–16)
IMM GRANULOCYTES # BLD AUTO: 0.04 K/UL (ref 0–0.04)
IMM GRANULOCYTES NFR BLD AUTO: 0.4 % (ref 0–0.5)
LIPASE SERPL-CCNC: 37 U/L (ref 4–60)
LYMPHOCYTES # BLD AUTO: 2.2 K/UL (ref 1–4.8)
LYMPHOCYTES NFR BLD: 21.2 % (ref 18–48)
MCH RBC QN AUTO: 29.4 PG (ref 27–31)
MCHC RBC AUTO-ENTMCNC: 32.9 G/DL (ref 32–36)
MCV RBC AUTO: 89 FL (ref 82–98)
MONOCYTES # BLD AUTO: 0.7 K/UL (ref 0.3–1)
MONOCYTES NFR BLD: 6.6 % (ref 4–15)
NEUTROPHILS # BLD AUTO: 4.7 K/UL (ref 1.8–7.7)
NEUTROPHILS NFR BLD: 45.9 % (ref 38–73)
NRBC BLD-RTO: 0 /100 WBC
PLATELET # BLD AUTO: 228 K/UL (ref 150–450)
PMV BLD AUTO: 10.6 FL (ref 9.2–12.9)
POTASSIUM SERPL-SCNC: 4.5 MMOL/L (ref 3.5–5.1)
PROT SERPL-MCNC: 6.5 G/DL (ref 6–8.4)
RBC # BLD AUTO: 4.63 M/UL (ref 4–5.4)
SODIUM SERPL-SCNC: 140 MMOL/L (ref 136–145)
TSH SERPL DL<=0.005 MIU/L-ACNC: 1.32 UIU/ML (ref 0.4–4)
WBC # BLD AUTO: 10.18 K/UL (ref 3.9–12.7)

## 2023-09-14 PROCEDURE — 87811 SARS CORONAVIRUS 2 ANTIGEN POCT, MANUAL READ: ICD-10-PCS | Mod: QW,S$GLB,, | Performed by: PHYSICIAN ASSISTANT

## 2023-09-14 PROCEDURE — 83690 ASSAY OF LIPASE: CPT | Performed by: PHYSICIAN ASSISTANT

## 2023-09-14 PROCEDURE — 3078F DIAST BP <80 MM HG: CPT | Mod: CPTII,S$GLB,, | Performed by: PHYSICIAN ASSISTANT

## 2023-09-14 PROCEDURE — 4010F ACE/ARB THERAPY RXD/TAKEN: CPT | Mod: CPTII,S$GLB,, | Performed by: PHYSICIAN ASSISTANT

## 2023-09-14 PROCEDURE — 84443 ASSAY THYROID STIM HORMONE: CPT | Performed by: PHYSICIAN ASSISTANT

## 2023-09-14 PROCEDURE — 87449 NOS EACH ORGANISM AG IA: CPT | Performed by: PHYSICIAN ASSISTANT

## 2023-09-14 PROCEDURE — 87045 FECES CULTURE AEROBIC BACT: CPT | Performed by: PHYSICIAN ASSISTANT

## 2023-09-14 PROCEDURE — 87046 STOOL CULTR AEROBIC BACT EA: CPT | Performed by: PHYSICIAN ASSISTANT

## 2023-09-14 PROCEDURE — 82150 ASSAY OF AMYLASE: CPT | Performed by: PHYSICIAN ASSISTANT

## 2023-09-14 PROCEDURE — 87493 C DIFF AMPLIFIED PROBE: CPT | Performed by: PHYSICIAN ASSISTANT

## 2023-09-14 PROCEDURE — 3075F PR MOST RECENT SYSTOLIC BLOOD PRESS GE 130-139MM HG: ICD-10-PCS | Mod: CPTII,S$GLB,, | Performed by: PHYSICIAN ASSISTANT

## 2023-09-14 PROCEDURE — 1159F MED LIST DOCD IN RCRD: CPT | Mod: CPTII,S$GLB,, | Performed by: PHYSICIAN ASSISTANT

## 2023-09-14 PROCEDURE — 83036 HEMOGLOBIN GLYCOSYLATED A1C: CPT | Performed by: PHYSICIAN ASSISTANT

## 2023-09-14 PROCEDURE — 87449 NOS EACH ORGANISM AG IA: CPT | Mod: 91 | Performed by: PHYSICIAN ASSISTANT

## 2023-09-14 PROCEDURE — 87209 SMEAR COMPLEX STAIN: CPT | Performed by: PHYSICIAN ASSISTANT

## 2023-09-14 PROCEDURE — 89055 LEUKOCYTE ASSESSMENT FECAL: CPT | Performed by: PHYSICIAN ASSISTANT

## 2023-09-14 PROCEDURE — 3008F PR BODY MASS INDEX (BMI) DOCUMENTED: ICD-10-PCS | Mod: CPTII,S$GLB,, | Performed by: PHYSICIAN ASSISTANT

## 2023-09-14 PROCEDURE — 36415 PR COLLECTION VENOUS BLOOD,VENIPUNCTURE: ICD-10-PCS | Mod: S$GLB,,, | Performed by: PHYSICIAN ASSISTANT

## 2023-09-14 PROCEDURE — 85025 COMPLETE CBC W/AUTO DIFF WBC: CPT | Performed by: PHYSICIAN ASSISTANT

## 2023-09-14 PROCEDURE — 87427 SHIGA-LIKE TOXIN AG IA: CPT | Mod: 59 | Performed by: PHYSICIAN ASSISTANT

## 2023-09-14 PROCEDURE — 99214 OFFICE O/P EST MOD 30 MIN: CPT | Mod: S$GLB,,, | Performed by: PHYSICIAN ASSISTANT

## 2023-09-14 PROCEDURE — 80053 COMPREHEN METABOLIC PANEL: CPT | Performed by: PHYSICIAN ASSISTANT

## 2023-09-14 PROCEDURE — 4010F PR ACE/ARB THEARPY RXD/TAKEN: ICD-10-PCS | Mod: CPTII,S$GLB,, | Performed by: PHYSICIAN ASSISTANT

## 2023-09-14 PROCEDURE — 99214 PR OFFICE/OUTPT VISIT, EST, LEVL IV, 30-39 MIN: ICD-10-PCS | Mod: S$GLB,,, | Performed by: PHYSICIAN ASSISTANT

## 2023-09-14 PROCEDURE — 3008F BODY MASS INDEX DOCD: CPT | Mod: CPTII,S$GLB,, | Performed by: PHYSICIAN ASSISTANT

## 2023-09-14 PROCEDURE — 87329 GIARDIA AG IA: CPT | Performed by: PHYSICIAN ASSISTANT

## 2023-09-14 PROCEDURE — 3078F PR MOST RECENT DIASTOLIC BLOOD PRESSURE < 80 MM HG: ICD-10-PCS | Mod: CPTII,S$GLB,, | Performed by: PHYSICIAN ASSISTANT

## 2023-09-14 PROCEDURE — 36415 COLL VENOUS BLD VENIPUNCTURE: CPT | Mod: S$GLB,,, | Performed by: PHYSICIAN ASSISTANT

## 2023-09-14 PROCEDURE — 3075F SYST BP GE 130 - 139MM HG: CPT | Mod: CPTII,S$GLB,, | Performed by: PHYSICIAN ASSISTANT

## 2023-09-14 PROCEDURE — 87811 SARS-COV-2 COVID19 W/OPTIC: CPT | Mod: QW,S$GLB,, | Performed by: PHYSICIAN ASSISTANT

## 2023-09-14 PROCEDURE — 1159F PR MEDICATION LIST DOCUMENTED IN MEDICAL RECORD: ICD-10-PCS | Mod: CPTII,S$GLB,, | Performed by: PHYSICIAN ASSISTANT

## 2023-09-14 RX ORDER — HYOSCYAMINE SULFATE 0.38 MG/1
0.38 TABLET, EXTENDED RELEASE ORAL EVERY 12 HOURS
Qty: 60 TABLET | Refills: 0 | Status: ON HOLD | OUTPATIENT
Start: 2023-09-14 | End: 2023-10-31 | Stop reason: HOSPADM

## 2023-09-14 NOTE — PROGRESS NOTES
Subjective     Patient ID: Alondra Bryan is a 63 y.o. female.    Chief Complaint: Diarrhea (Going on 3 weeks )    Diarrhea   This is a recurrent problem. The current episode started 1 to 4 weeks ago. The problem occurs more than 10 times per day. The problem has been waxing and waning. The stool consistency is described as Watery. The patient states that diarrhea awakens her from sleep. Associated symptoms include abdominal pain and myalgias. Pertinent negatives include no headaches. Nothing aggravates the symptoms. Risk factors include ill contacts. She has tried anti-motility drug, change of diet, electrolyte solution and increased fluids for the symptoms. The treatment provided mild relief.     Past Medical History:   Diagnosis Date    Arthritis     Asthma     Colon polyp     GERD (gastroesophageal reflux disease)     Herpes simplex without mention of complication     Hypertension     Hypothyroidism     Lung mass     seeing pulmonology       Review of Systems   Constitutional:  Positive for activity change, appetite change and fatigue.   HENT: Negative.     Respiratory:  Negative for chest tightness, shortness of breath and wheezing.    Cardiovascular:  Negative for chest pain.   Gastrointestinal:  Positive for abdominal pain and diarrhea. Negative for blood in stool.   Genitourinary: Negative.    Musculoskeletal:  Positive for myalgias.   Neurological:  Negative for dizziness, light-headedness and headaches.          Objective     Physical Exam  Vitals reviewed.   Constitutional:       General: She is not in acute distress.     Appearance: Normal appearance. She is not ill-appearing, toxic-appearing or diaphoretic.   HENT:      Mouth/Throat:      Mouth: Mucous membranes are moist.   Neck:      Vascular: No carotid bruit.   Cardiovascular:      Rate and Rhythm: Normal rate and regular rhythm.      Pulses: Normal pulses.      Heart sounds: Normal heart sounds. No murmur heard.     No friction rub. No gallop.    Pulmonary:      Effort: Pulmonary effort is normal. No respiratory distress.      Breath sounds: Normal breath sounds. No stridor. No wheezing, rhonchi or rales.   Chest:      Chest wall: No tenderness.   Abdominal:      General: Abdomen is flat. There is no distension.      Palpations: Abdomen is soft. There is no mass.      Tenderness: There is no abdominal tenderness. There is no right CVA tenderness, left CVA tenderness, guarding or rebound.      Hernia: No hernia is present.   Musculoskeletal:         General: No swelling.      Cervical back: No rigidity or tenderness.   Lymphadenopathy:      Cervical: No cervical adenopathy.   Skin:     General: Skin is warm and dry.   Neurological:      Mental Status: She is alert.   Psychiatric:         Mood and Affect: Mood normal.         Behavior: Behavior normal.         Thought Content: Thought content normal.         Judgment: Judgment normal.            Assessment and Plan     1. Diarrhea, unspecified type  -     Comprehensive Metabolic Panel; Future; Expected date: 09/14/2023  -     TSH; Future; Expected date: 09/14/2023  -     CBC Auto Differential; Future; Expected date: 09/14/2023  -     WBC, Stool; Future; Expected date: 09/14/2023  -     Clostridium difficile EIA; Future; Expected date: 09/14/2023  -     Giardia / Cryptosporidum, EIA; Future; Expected date: 09/14/2023  -     Stool culture; Future; Expected date: 09/14/2023  -     Stool Exam-Ova,Cysts,Parasites; Future; Expected date: 09/14/2023  -     Amylase; Future; Expected date: 09/14/2023  -     Lipase; Future; Expected date: 09/14/2023    2. Wellness examination  -     Hemoglobin A1C; Future; Expected date: 09/14/2023    Other orders  -     hyoscyamine (LEVBID) 0.375 mg Tb12; Take 1 tablet (0.375 mg total) by mouth every 12 (twelve) hours.  Dispense: 60 tablet; Refill: 0        I spent 30 minutes on this encounter, time includes face-to-face, chart review, documentation, test review and orders.            Fu pending lab and studies. ER if worse

## 2023-09-16 LAB
C DIFF GDH STL QL: POSITIVE
C DIFF TOX A+B STL QL IA: NEGATIVE
C DIFF TOX GENS STL QL NAA+PROBE: NEGATIVE
WBC #/AREA STL HPF: NORMAL /[HPF]

## 2023-09-17 LAB
CRYPTOSP AG STL QL IA: NEGATIVE
E COLI SXT1 STL QL IA: NEGATIVE
E COLI SXT2 STL QL IA: NEGATIVE
G LAMBLIA AG STL QL IA: NEGATIVE

## 2023-09-18 LAB — BACTERIA STL CULT: NORMAL

## 2023-09-18 RX ORDER — METRONIDAZOLE 500 MG/1
500 TABLET ORAL EVERY 8 HOURS
Qty: 30 TABLET | Refills: 0 | Status: SHIPPED | OUTPATIENT
Start: 2023-09-18 | End: 2023-09-25

## 2023-09-20 LAB — O+P STL MICRO: NORMAL

## 2023-09-21 ENCOUNTER — PATIENT MESSAGE (OUTPATIENT)
Dept: PRIMARY CARE CLINIC | Facility: CLINIC | Age: 63
End: 2023-09-21
Payer: COMMERCIAL

## 2023-09-22 ENCOUNTER — TELEPHONE (OUTPATIENT)
Dept: PRIMARY CARE CLINIC | Facility: CLINIC | Age: 63
End: 2023-09-22
Payer: COMMERCIAL

## 2023-09-22 DIAGNOSIS — R74.02 NONSPECIFIC ELEVATION OF LEVELS OF TRANSAMINASE OR LACTIC ACID DEHYDROGENASE (LDH): Primary | ICD-10-CM

## 2023-09-22 DIAGNOSIS — R74.01 NONSPECIFIC ELEVATION OF LEVELS OF TRANSAMINASE OR LACTIC ACID DEHYDROGENASE (LDH): Primary | ICD-10-CM

## 2023-09-22 NOTE — TELEPHONE ENCOUNTER
I am glad you are better. It do not believe is was put in the system. If was likely negative since my team did not notify me. We can repeat one if you like.

## 2023-09-22 NOTE — PROGRESS NOTES
Nonspecific elevation of levels of transaminase or lactic acid dehydrogenase (LDH)  -     Hepatitis B Core Antibody, Total; Future; Expected date: 09/22/2023  -     Hepatitis B Surface Ab, Qualitative; Future; Expected date: 09/22/2023  -     Hepatitis B Surface Antigen; Future; Expected date: 09/22/2023  -     ALPHA-1-ANTITRYPSIN; Future; Expected date: 09/22/2023  -     Celiac Disease Panel; Future; Expected date: 09/22/2023  -     CERULOPLASMIN; Future; Expected date: 09/22/2023  -     Gamma GT; Future; Expected date: 09/22/2023  -     Hepatic Function Panel; Future; Expected date: 09/22/2023  -     HEPATITIS A ANTIBODY, IGM; Future; Expected date: 09/22/2023  -     HEPATITIS A ANTIBODY, IGG; Future; Expected date: 09/22/2023  -     IMMUNOGLOBULINS (IGG, IGA, IGM) QUANTITATIVE; Future; Expected date: 09/22/2023  -     Lipase; Future; Expected date: 09/22/2023  -     Anti-Smooth Muscle Antibody; Future; Expected date: 09/22/2023  -     Protime-INR; Future; Expected date: 09/22/2023  -     Radha-Barr Virus (EBV) Antibody Panel; Future; Expected date: 09/22/2023  -     Cytomegalovirus Antibodies (IGG, IGM); Future; Expected date: 09/22/2023  -     US Abdomen Limited; Future; Expected date: 09/22/2023  -     Lipid Panel; Future; Expected date: 09/22/2023      Please arrange for these tests to be done fasting at an [facility] facility closer to her.    Thanks

## 2023-09-25 ENCOUNTER — LAB VISIT (OUTPATIENT)
Dept: LAB | Facility: HOSPITAL | Age: 63
End: 2023-09-25
Attending: INTERNAL MEDICINE
Payer: COMMERCIAL

## 2023-09-25 ENCOUNTER — OFFICE VISIT (OUTPATIENT)
Dept: GASTROENTEROLOGY | Facility: CLINIC | Age: 63
End: 2023-09-25
Payer: COMMERCIAL

## 2023-09-25 VITALS — WEIGHT: 157.19 LBS | HEIGHT: 64 IN | BODY MASS INDEX: 26.84 KG/M2

## 2023-09-25 DIAGNOSIS — Z90.49 S/P CHOLECYSTECTOMY: ICD-10-CM

## 2023-09-25 DIAGNOSIS — R10.13 DYSPEPSIA: ICD-10-CM

## 2023-09-25 DIAGNOSIS — K22.70 BARRETT'S ESOPHAGUS WITHOUT DYSPLASIA: ICD-10-CM

## 2023-09-25 DIAGNOSIS — R74.02 NONSPECIFIC ELEVATION OF LEVELS OF TRANSAMINASE OR LACTIC ACID DEHYDROGENASE (LDH): ICD-10-CM

## 2023-09-25 DIAGNOSIS — R79.89 ELEVATED LFTS: ICD-10-CM

## 2023-09-25 DIAGNOSIS — R19.7 ACUTE DIARRHEA: Primary | ICD-10-CM

## 2023-09-25 DIAGNOSIS — R74.01 NONSPECIFIC ELEVATION OF LEVELS OF TRANSAMINASE OR LACTIC ACID DEHYDROGENASE (LDH): ICD-10-CM

## 2023-09-25 DIAGNOSIS — Z86.010 HISTORY OF COLON POLYPS: ICD-10-CM

## 2023-09-25 LAB
A1AT SERPL-MCNC: 169 MG/DL (ref 100–190)
ALBUMIN SERPL BCP-MCNC: 3.7 G/DL (ref 3.5–5.2)
ALP SERPL-CCNC: 73 U/L (ref 55–135)
ALT SERPL W/O P-5'-P-CCNC: 22 U/L (ref 10–44)
AST SERPL-CCNC: 16 U/L (ref 10–40)
BILIRUB DIRECT SERPL-MCNC: 0.2 MG/DL (ref 0.1–0.3)
BILIRUB SERPL-MCNC: 0.5 MG/DL (ref 0.1–1)
CERULOPLASMIN SERPL-MCNC: 34 MG/DL (ref 15–45)
CHOLEST SERPL-MCNC: 221 MG/DL (ref 120–199)
CHOLEST/HDLC SERPL: 5.3 {RATIO} (ref 2–5)
CTP QC/QA: YES
GGT SERPL-CCNC: 27 U/L (ref 8–55)
HAV IGG SER QL IA: NORMAL
HAV IGM SERPL QL IA: NORMAL
HBV CORE AB SERPL QL IA: NORMAL
HBV SURFACE AB SER-ACNC: <3 MIU/ML
HBV SURFACE AB SER-ACNC: NORMAL M[IU]/ML
HBV SURFACE AG SERPL QL IA: NORMAL
HDLC SERPL-MCNC: 42 MG/DL (ref 40–75)
HDLC SERPL: 19 % (ref 20–50)
IGA SERPL-MCNC: 123 MG/DL (ref 40–350)
IGG SERPL-MCNC: 681 MG/DL (ref 650–1600)
IGM SERPL-MCNC: 68 MG/DL (ref 50–300)
INR PPP: 0.9 (ref 0.8–1.2)
LDLC SERPL CALC-MCNC: 153.6 MG/DL (ref 63–159)
LIPASE SERPL-CCNC: 20 U/L (ref 4–60)
NONHDLC SERPL-MCNC: 179 MG/DL
PROT SERPL-MCNC: 6.5 G/DL (ref 6–8.4)
PROTHROMBIN TIME: 9.8 SEC (ref 9–12.5)
SARS-COV-2 AG RESP QL IA.RAPID: NEGATIVE
TRIGL SERPL-MCNC: 127 MG/DL (ref 30–150)

## 2023-09-25 PROCEDURE — 3044F HG A1C LEVEL LT 7.0%: CPT | Mod: CPTII,S$GLB,, | Performed by: NURSE PRACTITIONER

## 2023-09-25 PROCEDURE — 85610 PROTHROMBIN TIME: CPT | Mod: PO | Performed by: INTERNAL MEDICINE

## 2023-09-25 PROCEDURE — 1159F PR MEDICATION LIST DOCUMENTED IN MEDICAL RECORD: ICD-10-PCS | Mod: CPTII,S$GLB,, | Performed by: NURSE PRACTITIONER

## 2023-09-25 PROCEDURE — 86706 HEP B SURFACE ANTIBODY: CPT | Performed by: INTERNAL MEDICINE

## 2023-09-25 PROCEDURE — 86015 ACTIN ANTIBODY EACH: CPT | Performed by: INTERNAL MEDICINE

## 2023-09-25 PROCEDURE — 86364 TISS TRNSGLTMNASE EA IG CLAS: CPT | Mod: 59 | Performed by: INTERNAL MEDICINE

## 2023-09-25 PROCEDURE — 1160F RVW MEDS BY RX/DR IN RCRD: CPT | Mod: CPTII,S$GLB,, | Performed by: NURSE PRACTITIONER

## 2023-09-25 PROCEDURE — 86665 EPSTEIN-BARR CAPSID VCA: CPT | Mod: 59 | Performed by: INTERNAL MEDICINE

## 2023-09-25 PROCEDURE — 99999 PR PBB SHADOW E&M-EST. PATIENT-LVL IV: ICD-10-PCS | Mod: PBBFAC,,, | Performed by: NURSE PRACTITIONER

## 2023-09-25 PROCEDURE — 82390 ASSAY OF CERULOPLASMIN: CPT | Performed by: INTERNAL MEDICINE

## 2023-09-25 PROCEDURE — 99999 PR PBB SHADOW E&M-EST. PATIENT-LVL IV: CPT | Mod: PBBFAC,,, | Performed by: NURSE PRACTITIONER

## 2023-09-25 PROCEDURE — 3044F PR MOST RECENT HEMOGLOBIN A1C LEVEL <7.0%: ICD-10-PCS | Mod: CPTII,S$GLB,, | Performed by: NURSE PRACTITIONER

## 2023-09-25 PROCEDURE — 87340 HEPATITIS B SURFACE AG IA: CPT | Performed by: INTERNAL MEDICINE

## 2023-09-25 PROCEDURE — 99204 OFFICE O/P NEW MOD 45 MIN: CPT | Mod: S$GLB,,, | Performed by: NURSE PRACTITIONER

## 2023-09-25 PROCEDURE — 86704 HEP B CORE ANTIBODY TOTAL: CPT | Performed by: INTERNAL MEDICINE

## 2023-09-25 PROCEDURE — 99204 PR OFFICE/OUTPT VISIT, NEW, LEVL IV, 45-59 MIN: ICD-10-PCS | Mod: S$GLB,,, | Performed by: NURSE PRACTITIONER

## 2023-09-25 PROCEDURE — 82784 ASSAY IGA/IGD/IGG/IGM EACH: CPT | Mod: 59 | Performed by: INTERNAL MEDICINE

## 2023-09-25 PROCEDURE — 3008F BODY MASS INDEX DOCD: CPT | Mod: CPTII,S$GLB,, | Performed by: NURSE PRACTITIONER

## 2023-09-25 PROCEDURE — 82977 ASSAY OF GGT: CPT | Performed by: INTERNAL MEDICINE

## 2023-09-25 PROCEDURE — 80061 LIPID PANEL: CPT | Performed by: INTERNAL MEDICINE

## 2023-09-25 PROCEDURE — 86709 HEPATITIS A IGM ANTIBODY: CPT | Performed by: INTERNAL MEDICINE

## 2023-09-25 PROCEDURE — 1159F MED LIST DOCD IN RCRD: CPT | Mod: CPTII,S$GLB,, | Performed by: NURSE PRACTITIONER

## 2023-09-25 PROCEDURE — 83690 ASSAY OF LIPASE: CPT | Performed by: INTERNAL MEDICINE

## 2023-09-25 PROCEDURE — 86790 VIRUS ANTIBODY NOS: CPT | Performed by: INTERNAL MEDICINE

## 2023-09-25 PROCEDURE — 3008F PR BODY MASS INDEX (BMI) DOCUMENTED: ICD-10-PCS | Mod: CPTII,S$GLB,, | Performed by: NURSE PRACTITIONER

## 2023-09-25 PROCEDURE — 80076 HEPATIC FUNCTION PANEL: CPT | Performed by: INTERNAL MEDICINE

## 2023-09-25 PROCEDURE — 1160F PR REVIEW ALL MEDS BY PRESCRIBER/CLIN PHARMACIST DOCUMENTED: ICD-10-PCS | Mod: CPTII,S$GLB,, | Performed by: NURSE PRACTITIONER

## 2023-09-25 PROCEDURE — 4010F PR ACE/ARB THEARPY RXD/TAKEN: ICD-10-PCS | Mod: CPTII,S$GLB,, | Performed by: NURSE PRACTITIONER

## 2023-09-25 PROCEDURE — 4010F ACE/ARB THERAPY RXD/TAKEN: CPT | Mod: CPTII,S$GLB,, | Performed by: NURSE PRACTITIONER

## 2023-09-25 PROCEDURE — 82103 ALPHA-1-ANTITRYPSIN TOTAL: CPT | Performed by: INTERNAL MEDICINE

## 2023-09-25 RX ORDER — OMEPRAZOLE 20 MG/1
20 CAPSULE, DELAYED RELEASE ORAL DAILY
Qty: 30 CAPSULE | Refills: 2 | Status: ON HOLD | OUTPATIENT
Start: 2023-09-25 | End: 2023-10-31 | Stop reason: SDUPTHER

## 2023-09-25 NOTE — PROGRESS NOTES
Subjective:       Patient ID: Alondra Bryan is a 63 y.o. female, Body mass index is 26.98 kg/m².    Chief Complaint: Diarrhea      Patient is new to me. Established patient of Dr. Mckeon.      Diarrhea   This is a new problem. The current episode started 1 to 4 weeks ago (Started 8/28/23). The problem occurs 5 to 10 times per day. The problem has been gradually improving. The stool consistency is described as Watery (describes stool as type 4 or 7 on bristol scale). The patient states that diarrhea awakens her from sleep. Associated symptoms include vomiting (at onset of symptoms; denies currently). Pertinent negatives include no abdominal pain, bloating, chills, coughing, fever, increased  flatus or weight loss. Associated symptoms comments: Associated symptoms also include fecal urgency and occ fecal incontinence. Nothing aggravates the symptoms. There are no known risk factors (denies recent antibiotics, hospitalization or foreign travel; exposed to COVID-19; stool studies done 9/15/23 showed positive C Diff antigen but neg PCR- she was prescribed Flagyl but did not take). She has tried nothing (has not started Levbid) for the symptoms. There is no history of bowel resection, inflammatory bowel disease, irritable bowel syndrome or a recent abdominal surgery. S/P cholecystectomy; Elevated LFTs- liver workup per Dr. Holloway pending; Hx of Oseguera's esophagus- reports frequent dyspepsia, not taking any medication currently     Review of Systems   Constitutional:  Negative for appetite change, chills, fever, unexpected weight change and weight loss.   HENT:  Negative for trouble swallowing.    Respiratory:  Negative for cough and shortness of breath.    Cardiovascular:  Negative for chest pain.   Gastrointestinal:  Positive for diarrhea and vomiting (at onset of symptoms; denies currently). Negative for abdominal distention, abdominal pain, anal bleeding, bloating, blood in stool, constipation, flatus, nausea and  rectal pain.   Genitourinary:  Negative for difficulty urinating and dysuria.   Musculoskeletal:  Negative for gait problem.   Skin:  Negative for rash.   Neurological:  Negative for speech difficulty.   Psychiatric/Behavioral:  Negative for confusion.        Past Medical History:   Diagnosis Date    Arthritis     Asthma     Colon polyp     GERD (gastroesophageal reflux disease)     Herpes simplex without mention of complication     Hypertension     Hypothyroidism     Lung mass     seeing pulmonology      Past Surgical History:   Procedure Laterality Date    BREAST BIOPSY Right 2016    needle bx    BREAST BIOPSY Left 2016    needle bx    CHOLECYSTECTOMY N/A     COLONOSCOPY N/A 10/12/2018    Procedure: COLONOSCOPY;  Surgeon: Sam Mckeon MD;  Location: [facility] ENDO;  Service: Endoscopy;  Laterality: N/A;    COLONOSCOPY  10/12/2018    Dr. Mckeon: 3 colon polyps removed, repeat in 5 years; biopsy HYPERPLASTIC POLYPs    ESOPHAGOGASTRODUODENOSCOPY N/A 7/3/2019    Procedure: EGD (ESOPHAGOGASTRODUODENOSCOPY);  Surgeon: Sam Mckeon MD;  Location: [facility];  Service: Endoscopy;  Laterality: N/A;    HYSTERECTOMY      TLH for fibroids    LUNG SURGERY  2017    removed right lower lobe, unscuccesful, inflammation    SINUS SURGERY        Family History   Problem Relation Age of Onset    Heart attack Father     Heart disease Father     Hypertension Brother     Lymphoma Sister     Hypertension Sister     Cancer Sister     Hypothyroidism Mother     Colon cancer Maternal Grandfather         dx 80    Breast cancer Neg Hx     Diabetes Neg Hx     Eclampsia Neg Hx     Miscarriages / Stillbirths Neg Hx     Ovarian cancer Neg Hx      labor Neg Hx     Stroke Neg Hx     Anesthesia problems Neg Hx     Crohn's disease Neg Hx     Ulcerative colitis Neg Hx     Stomach cancer Neg Hx     Esophageal cancer Neg Hx       Wt Readings from Last 10 Encounters:   23 71.3 kg (157 lb 3 oz)   23 71.2 kg (156 lb 15.5  oz)   09/21/22 72.9 kg (160 lb 11.5 oz)   06/10/22 72.3 kg (159 lb 8 oz)   06/04/22 71.7 kg (158 lb 2.9 oz)   06/16/21 73 kg (161 lb)   04/27/21 72.4 kg (159 lb 11.2 oz)   12/14/20 74.9 kg (165 lb 2 oz)   07/28/20 73.6 kg (162 lb 4.1 oz)   12/06/19 74.5 kg (164 lb 3.9 oz)     Lab Results   Component Value Date    WBC 10.18 09/14/2023    HGB 13.6 09/14/2023    HCT 41.3 09/14/2023    MCV 89 09/14/2023     09/14/2023     CMP  Sodium   Date Value Ref Range Status   09/14/2023 140 136 - 145 mmol/L Final     Potassium   Date Value Ref Range Status   09/14/2023 4.5 3.5 - 5.1 mmol/L Final     Chloride   Date Value Ref Range Status   09/14/2023 104 95 - 110 mmol/L Final     CO2   Date Value Ref Range Status   09/14/2023 27 23 - 29 mmol/L Final     Glucose   Date Value Ref Range Status   09/14/2023 104 70 - 110 mg/dL Final     BUN   Date Value Ref Range Status   09/14/2023 10 8 - 23 mg/dL Final     Creatinine   Date Value Ref Range Status   09/14/2023 0.8 0.5 - 1.4 mg/dL Final     Calcium   Date Value Ref Range Status   09/14/2023 9.5 8.7 - 10.5 mg/dL Final     Total Protein   Date Value Ref Range Status   09/14/2023 6.5 6.0 - 8.4 g/dL Final     Albumin   Date Value Ref Range Status   09/14/2023 3.9 3.5 - 5.2 g/dL Final     Total Bilirubin   Date Value Ref Range Status   09/14/2023 0.3 0.1 - 1.0 mg/dL Final     Comment:     For infants and newborns, interpretation of results should be based  on gestational age, weight and in agreement with clinical  observations.    Premature Infant recommended reference ranges:  Up to 24 hours.............<8.0 mg/dL  Up to 48 hours............<12.0 mg/dL  3-5 days..................<15.0 mg/dL  6-29 days.................<15.0 mg/dL       Alkaline Phosphatase   Date Value Ref Range Status   09/14/2023 141 (H) 55 - 135 U/L Final     AST   Date Value Ref Range Status   09/14/2023 303 (H) 10 - 40 U/L Final     ALT   Date Value Ref Range Status   09/14/2023 312 (H) 10 - 44 U/L Final      Anion Gap   Date Value Ref Range Status   09/14/2023 9 8 - 16 mmol/L Final     eGFR if    Date Value Ref Range Status   06/06/2022 >60.0 >60 mL/min/1.73 m^2 Final     eGFR if non    Date Value Ref Range Status   06/06/2022 >60.0 >60 mL/min/1.73 m^2 Final     Comment:     Calculation used to obtain the estimated glomerular filtration  rate (eGFR) is the CKD-EPI equation.        Lab Results   Component Value Date    AMYLASE 68 09/14/2023     Lab Results   Component Value Date    LIPASE 37 09/14/2023              Reviewed prior medical records including endoscopy history (see surgical history).     Objective:      Physical Exam  Constitutional:       General: She is not in acute distress.     Appearance: She is well-developed.   HENT:      Head: Normocephalic.      Right Ear: Hearing normal.      Left Ear: Hearing normal.      Nose: Nose normal.      Mouth/Throat:      Mouth: No oral lesions.      Pharynx: Uvula midline.   Eyes:      General: Lids are normal.      Conjunctiva/sclera: Conjunctivae normal.      Pupils: Pupils are equal, round, and reactive to light.   Neck:      Trachea: Trachea normal.   Cardiovascular:      Rate and Rhythm: Normal rate and regular rhythm.      Heart sounds: Normal heart sounds. No murmur heard.  Pulmonary:      Effort: Pulmonary effort is normal. No respiratory distress.      Breath sounds: Normal breath sounds. No stridor. No wheezing.   Abdominal:      General: Bowel sounds are normal. There is no distension.      Palpations: Abdomen is soft. There is no mass.      Tenderness: There is no abdominal tenderness. There is no guarding or rebound.   Musculoskeletal:         General: Normal range of motion.      Cervical back: Normal range of motion.   Skin:     General: Skin is warm and dry.      Findings: No rash.      Comments: Non jaundiced   Neurological:      Mental Status: She is alert and oriented to person, place, and time.   Psychiatric:          Speech: Speech normal.         Behavior: Behavior normal. Behavior is cooperative.           Assessment:       1. Acute diarrhea    2. Dyspepsia    3. Oseguera's esophagus without dysplasia    4. Elevated LFTs    5. History of colon polyps    6. S/P cholecystectomy           Plan:   All diagnoses and orders for this visit:    Acute diarrhea   - Recommended increase fiber in diet, especially soluble fiber since this can help bulk up the stool consistency and may help to slow down how fast the stool goes through the colon and can prevent diarrhea    - Start Levbid 0.375 mg BID PRN   - Schedule Colonoscopy     Dyspepsia & Oseguera's esophagus without dysplasia  - Start: omeprazole (PRILOSEC) 20 MG capsule; Take 1 capsule (20 mg total) by mouth once daily.  Dispense: 30 capsule; Refill: 2  - Schedule EGD   - Take PPI in the morning 30 minutes before breakfast  - Recommend to avoid large meals, avoid eating within 3 hours of bedtime, elevate head of bed if nocturnal symptoms are present, smoking cessation (if current smoker), & weight loss (if overweight).   - Recommend minimize/avoid high-fat foods, chocolate, caffeine, citrus, alcohol, & tomato products.  - Advised to avoid/limit use of NSAID's, since they can cause GI upset, bleeding, and/or ulcers. If needed, take with food.      Elevated LFTs   - Continue with workup per Dr. Holloway  - Recommend: low fat, low cholesterol diet, maintain good control of blood sugars and cholesterol levels, exercise, weight loss (if overweight), minimize/avoid alcohol and tylenol products, & follow-up with PCP for continued evaluation and management; if specialist is needed, recommend seeing hepatology.     History of colon polyps   - Schedule Colonoscopy     S/P cholecystectomy    If no improvement in symptoms or symptoms worsen, call/follow-up at clinic or go to ER

## 2023-09-26 ENCOUNTER — PATIENT MESSAGE (OUTPATIENT)
Dept: GASTROENTEROLOGY | Facility: CLINIC | Age: 63
End: 2023-09-26
Payer: COMMERCIAL

## 2023-09-26 LAB — SMOOTH MUSCLE AB TITR SER IF: NORMAL {TITER}

## 2023-09-26 NOTE — PROGRESS NOTES
Subject: Test Results     09/26/2023    Dear Alondra Bryan,    I hope this message finds you in good health. I am pleased to inform you that the recent tests you underwent have returned with normal except your cholesterol. I recommend you follow up with your PCP and/or cardiology to begin medical therapy, or implement lifestyle changes that will include a mediterranean diet, and check the levels again in 3 months. The liver enzymes are back to normal so I suspect you had a viral infection that caused GI issues including diarrhea and elevated liver enzymes.     Additionally, I want to take this opportunity to express my gratitude for choosing our healthcare services. We always strive to provide the best care possible, and your feedback is valuable to us. If you feel comfortable, I encourage you to share your experience with us by leaving a Google review. Your honest review can help others seeking healthcare services make informed decisions and inspire us to continuously improve our care.    If you have any questions or concerns about your results or anything else related to your health, please don't hesitate to reach out. We are here to support you on your health journey.      We genuinely value your feedback and believe that it plays a crucial role in our pursuit of excellence. We kindly request you to take a few minutes of your time to share your experience with us by posting a Google review. Your honest thoughts and opinions can make a significant difference for others seeking healthcare services and will help us better understand how we can further enhance our patient care.    Your kind words and positive reviews will not only motivate our dedicated team but will also inspire confidence in potential patients who are looking for exceptional healthcare services.    Once again, we extend our sincere appreciation for giving us the opportunity to serve you. If there is anything else we can do to improve your experience  "or assist you further, please do not hesitate to reach out to us.    Thank you for being part of our [facility] family, and we look forward to continuing to provide you with the best care possible.    Thanks for trusting us with your healthcare needs and using [facility]. If you want to ask us a question, you can do so by replying to this message or by calling [phone].    Sincerely,    Henry Holloway M.D.    PS: At [facility] we offer virtual visits. Please use your [facility] fallon to schedule a virtual visit.     To rate your experience with Dr. Holloway please click on the link below:    [email or web address]    To post a review, simply follow these quick steps:  1. Visit [employer] or search for my name on Google.  2. Click on the "Write a review" button on the left-hand side of the page.  3. Rate your experience by choosing the number of stars that reflect your satisfaction.  4. Share your thoughts and insights about your visit - we'd love to hear your feedback!"

## 2023-09-28 ENCOUNTER — PATIENT MESSAGE (OUTPATIENT)
Dept: PRIMARY CARE CLINIC | Facility: CLINIC | Age: 63
End: 2023-09-28
Payer: COMMERCIAL

## 2023-09-28 LAB
EBV EA IGG SER-ACNC: 36.1 U/ML
EBV NA IGG SER-ACNC: 236 U/ML
EBV VCA IGG SER-ACNC: 458 U/ML
EBV VCA IGM SER-ACNC: <10 U/ML

## 2023-09-30 LAB
GLIADIN PEPTIDE IGA SER-ACNC: 0.5 U/ML
GLIADIN PEPTIDE IGG SER-ACNC: <0.6 U/ML
IGA SERPL-MCNC: 115 MG/DL (ref 70–400)
TTG IGA SER-ACNC: 0.3 U/ML
TTG IGG SER-ACNC: <0.6 U/ML

## 2023-10-10 ENCOUNTER — TELEPHONE (OUTPATIENT)
Dept: PREADMISSION TESTING | Facility: HOSPITAL | Age: 63
End: 2023-10-10
Payer: COMMERCIAL

## 2023-10-10 ENCOUNTER — PATIENT MESSAGE (OUTPATIENT)
Dept: PREADMISSION TESTING | Facility: HOSPITAL | Age: 63
End: 2023-10-10
Payer: COMMERCIAL

## 2023-10-10 ENCOUNTER — PATIENT MESSAGE (OUTPATIENT)
Dept: GASTROENTEROLOGY | Facility: CLINIC | Age: 63
End: 2023-10-10
Payer: COMMERCIAL

## 2023-10-10 DIAGNOSIS — Z86.010 HX OF COLONIC POLYPS: ICD-10-CM

## 2023-10-10 DIAGNOSIS — K22.70 BARRETT'S ESOPHAGUS WITHOUT DYSPLASIA: ICD-10-CM

## 2023-10-10 DIAGNOSIS — R19.7 DIARRHEA, UNSPECIFIED TYPE: Primary | ICD-10-CM

## 2023-10-10 DIAGNOSIS — R10.13 DYSPEPSIA: ICD-10-CM

## 2023-10-11 RX ORDER — POLYETHYLENE GLYCOL 3350, SODIUM SULFATE ANHYDROUS, SODIUM BICARBONATE, SODIUM CHLORIDE, POTASSIUM CHLORIDE 236; 22.74; 6.74; 5.86; 2.97 G/4L; G/4L; G/4L; G/4L; G/4L
4 POWDER, FOR SOLUTION ORAL ONCE
Qty: 4000 ML | Refills: 0 | Status: SHIPPED | OUTPATIENT
Start: 2023-10-11 | End: 2023-10-11

## 2023-10-16 ENCOUNTER — TELEPHONE (OUTPATIENT)
Dept: PREADMISSION TESTING | Facility: HOSPITAL | Age: 63
End: 2023-10-16
Payer: COMMERCIAL

## 2023-10-16 NOTE — TELEPHONE ENCOUNTER
----- Message from Debby Gutierres sent at 10/16/2023  1:45 PM CDT -----  Regarding: prep question  Contact: pateint  Patient has questions regarding her prp, please call her back at [phone]. Thanks

## 2023-10-16 NOTE — TELEPHONE ENCOUNTER
Pt left in basket message regarding her prep for her procedure. Spoke to pt and answered her questions. Pt v/u. Instructions were emailed to address on file.

## 2023-10-17 ENCOUNTER — HOSPITAL ENCOUNTER (OUTPATIENT)
Dept: RADIOLOGY | Facility: HOSPITAL | Age: 63
Discharge: HOME | End: 2023-10-17
Attending: PHYSICIAN ASSISTANT
Payer: COMMERCIAL

## 2023-10-17 DIAGNOSIS — Z12.31 OTHER SCREENING MAMMOGRAM: ICD-10-CM

## 2023-10-17 PROCEDURE — 77063 BREAST TOMOSYNTHESIS BI: CPT | Mod: 26,,, | Performed by: RADIOLOGY

## 2023-10-17 PROCEDURE — 77067 SCR MAMMO BI INCL CAD: CPT | Mod: TC,PO

## 2023-10-17 PROCEDURE — 77067 SCR MAMMO BI INCL CAD: CPT | Mod: 26,,, | Performed by: RADIOLOGY

## 2023-10-17 PROCEDURE — 77063 MAMMO DIGITAL SCREENING BILAT WITH TOMO: ICD-10-PCS | Mod: 26,,, | Performed by: RADIOLOGY

## 2023-10-17 PROCEDURE — 77067 MAMMO DIGITAL SCREENING BILAT WITH TOMO: ICD-10-PCS | Mod: 26,,, | Performed by: RADIOLOGY

## 2023-10-19 ENCOUNTER — TELEPHONE (OUTPATIENT)
Dept: PRIMARY CARE CLINIC | Facility: CLINIC | Age: 63
End: 2023-10-19
Payer: COMMERCIAL

## 2023-10-19 NOTE — TELEPHONE ENCOUNTER
----- Message from Tom Miles III, PA-C sent at 10/17/2023  5:15 PM CDT -----  Alondra Bryan    The screening mammogram shows no evidence of malignancy. Recommend to repeat the scan in one year per guidelines.

## 2023-10-31 ENCOUNTER — ANESTHESIA EVENT (OUTPATIENT)
Dept: ENDOSCOPY | Facility: HOSPITAL | Age: 63
End: 2023-10-31
Payer: COMMERCIAL

## 2023-10-31 ENCOUNTER — HOSPITAL ENCOUNTER (OUTPATIENT)
Facility: HOSPITAL | Age: 63
Discharge: HOME | End: 2023-10-31
Attending: INTERNAL MEDICINE | Admitting: INTERNAL MEDICINE
Payer: COMMERCIAL

## 2023-10-31 ENCOUNTER — ANESTHESIA (OUTPATIENT)
Dept: ENDOSCOPY | Facility: HOSPITAL | Age: 63
End: 2023-10-31
Payer: COMMERCIAL

## 2023-10-31 DIAGNOSIS — R10.13 EPIGASTRIC PAIN: Primary | ICD-10-CM

## 2023-10-31 DIAGNOSIS — K22.70 BARRETT'S ESOPHAGUS WITHOUT DYSPLASIA: ICD-10-CM

## 2023-10-31 DIAGNOSIS — R10.13 DYSPEPSIA: ICD-10-CM

## 2023-10-31 DIAGNOSIS — Z86.010 HX OF COLONIC POLYPS: ICD-10-CM

## 2023-10-31 PROCEDURE — 37000009 HC ANESTHESIA EA ADD 15 MINS: Performed by: INTERNAL MEDICINE

## 2023-10-31 PROCEDURE — 63600175 PHARM REV CODE 636 W HCPCS: Performed by: NURSE ANESTHETIST, CERTIFIED REGISTERED

## 2023-10-31 PROCEDURE — 37000008 HC ANESTHESIA 1ST 15 MINUTES: Performed by: INTERNAL MEDICINE

## 2023-10-31 PROCEDURE — G0121 COLON CA SCRN NOT HI RSK IND: ICD-10-PCS | Mod: ,,, | Performed by: INTERNAL MEDICINE

## 2023-10-31 PROCEDURE — 43239 EGD BIOPSY SINGLE/MULTIPLE: CPT | Mod: 51,,, | Performed by: INTERNAL MEDICINE

## 2023-10-31 PROCEDURE — 88342 IMHCHEM/IMCYTCHM 1ST ANTB: CPT | Performed by: PATHOLOGY

## 2023-10-31 PROCEDURE — 43239 EGD BIOPSY SINGLE/MULTIPLE: CPT | Performed by: INTERNAL MEDICINE

## 2023-10-31 PROCEDURE — 63600175 PHARM REV CODE 636 W HCPCS: Performed by: INTERNAL MEDICINE

## 2023-10-31 PROCEDURE — 88342 CHG IMMUNOCYTOCHEMISTRY: ICD-10-PCS | Mod: 26,,, | Performed by: PATHOLOGY

## 2023-10-31 PROCEDURE — 88305 TISSUE EXAM BY PATHOLOGIST: ICD-10-PCS | Mod: 26,,, | Performed by: PATHOLOGY

## 2023-10-31 PROCEDURE — 43239 PR EGD, FLEX, W/BIOPSY, SGL/MULTI: ICD-10-PCS | Mod: 51,,, | Performed by: INTERNAL MEDICINE

## 2023-10-31 PROCEDURE — G0121 COLON CA SCRN NOT HI RSK IND: HCPCS | Mod: ,,, | Performed by: INTERNAL MEDICINE

## 2023-10-31 PROCEDURE — 25000003 PHARM REV CODE 250: Performed by: NURSE ANESTHETIST, CERTIFIED REGISTERED

## 2023-10-31 PROCEDURE — 88342 IMHCHEM/IMCYTCHM 1ST ANTB: CPT | Mod: 26,,, | Performed by: PATHOLOGY

## 2023-10-31 PROCEDURE — 25000003 PHARM REV CODE 250: Performed by: INTERNAL MEDICINE

## 2023-10-31 PROCEDURE — 88305 TISSUE EXAM BY PATHOLOGIST: CPT | Mod: 26,,, | Performed by: PATHOLOGY

## 2023-10-31 PROCEDURE — 88305 TISSUE EXAM BY PATHOLOGIST: CPT | Mod: 59 | Performed by: PATHOLOGY

## 2023-10-31 PROCEDURE — 27201012 HC FORCEPS, HOT/COLD, DISP: Performed by: INTERNAL MEDICINE

## 2023-10-31 PROCEDURE — G0121 COLON CA SCRN NOT HI RSK IND: HCPCS | Performed by: INTERNAL MEDICINE

## 2023-10-31 RX ORDER — PROPOFOL 10 MG/ML
VIAL (ML) INTRAVENOUS
Status: DISCONTINUED | OUTPATIENT
Start: 2023-10-31 | End: 2023-10-31

## 2023-10-31 RX ORDER — SODIUM CHLORIDE, SODIUM LACTATE, POTASSIUM CHLORIDE, CALCIUM CHLORIDE 600; 310; 30; 20 MG/100ML; MG/100ML; MG/100ML; MG/100ML
INJECTION, SOLUTION INTRAVENOUS CONTINUOUS
Status: DISCONTINUED | OUTPATIENT
Start: 2023-10-31 | End: 2023-10-31 | Stop reason: HOSPADM

## 2023-10-31 RX ORDER — LIDOCAINE HYDROCHLORIDE 10 MG/ML
INJECTION, SOLUTION EPIDURAL; INFILTRATION; INTRACAUDAL; PERINEURAL
Status: DISCONTINUED | OUTPATIENT
Start: 2023-10-31 | End: 2023-10-31

## 2023-10-31 RX ORDER — DEXTROMETHORPHAN/PSEUDOEPHED 2.5-7.5/.8
DROPS ORAL
Status: DISCONTINUED | OUTPATIENT
Start: 2023-10-31 | End: 2023-10-31 | Stop reason: HOSPADM

## 2023-10-31 RX ORDER — OMEPRAZOLE 20 MG/1
40 CAPSULE, DELAYED RELEASE ORAL 2 TIMES DAILY
Qty: 360 CAPSULE | Refills: 3 | Status: SHIPPED | OUTPATIENT
Start: 2023-10-31 | End: 2024-10-30

## 2023-10-31 RX ADMIN — PROPOFOL 30 MG: 10 INJECTION, EMULSION INTRAVENOUS at 12:10

## 2023-10-31 RX ADMIN — PROPOFOL 40 MG: 10 INJECTION, EMULSION INTRAVENOUS at 12:10

## 2023-10-31 RX ADMIN — LIDOCAINE HYDROCHLORIDE 5 ML: 10 SOLUTION INTRAVENOUS at 12:10

## 2023-10-31 RX ADMIN — PROPOFOL 50 MG: 10 INJECTION, EMULSION INTRAVENOUS at 12:10

## 2023-10-31 RX ADMIN — PROPOFOL 80 MG: 10 INJECTION, EMULSION INTRAVENOUS at 12:10

## 2023-10-31 RX ADMIN — SODIUM CHLORIDE, SODIUM LACTATE, POTASSIUM CHLORIDE, AND CALCIUM CHLORIDE: 600; 310; 30; 20 INJECTION, SOLUTION INTRAVENOUS at 12:10

## 2023-10-31 NOTE — ANESTHESIA POSTPROCEDURE EVALUATION
Anesthesia Post Evaluation    Patient: Alondra Bryan    Procedure(s) Performed: Procedure(s) (LRB):  EGD (ESOPHAGOGASTRODUODENOSCOPY) (N/A)  COLONOSCOPY (N/A)    Final Anesthesia Type: MAC      Patient location during evaluation: GI PACU  Patient participation: Yes- Able to Participate  Level of consciousness: awake and alert  Post-procedure vital signs: reviewed and stable  Pain management: adequate  Airway patency: patent    PONV status at discharge: No PONV  Anesthetic complications: no      Cardiovascular status: hemodynamically stable  Respiratory status: unassisted, room air and spontaneous ventilation  Hydration status: euvolemic  Follow-up not needed.          Vitals Value Taken Time   /58 10/31/23 1302   Temp 36.5 °C (97.7 °F) 10/31/23 1252   Pulse 68 10/31/23 1302   Resp 13 10/31/23 1302   SpO2 100 % 10/31/23 1302         No case tracking events are documented in the log.      Pain/Taylor Score: Taylor Score: 10 (10/31/2023  1:02 PM)

## 2023-10-31 NOTE — TRANSFER OF CARE
"Anesthesia Transfer of Care Note    Patient: Alondra Bryan    Procedure(s) Performed: Procedure(s) (LRB):  EGD (ESOPHAGOGASTRODUODENOSCOPY) (N/A)  COLONOSCOPY (N/A)    Patient location: GI    Anesthesia Type: MAC    Transport from OR: Transported from OR on room air with adequate spontaneous ventilation    Post pain: adequate analgesia    Post assessment: no apparent anesthetic complications    Post vital signs: stable    Level of consciousness: responds to stimulation    Nausea/Vomiting: no nausea/vomiting    Complications: none    Transfer of care protocol was followed      Last vitals:   Visit Vitals  /77   Pulse 72   Temp 36.6 °C (97.9 °F) (Temporal)   Resp 18   Ht 5' 4" (1.626 m)   Wt 66.7 kg (147 lb)   SpO2 100%   Breastfeeding No   BMI 25.23 kg/m²     "

## 2023-10-31 NOTE — PLAN OF CARE
DR RIZVI AT BEDSIDE TO SPEAK TO PT. REGARDING RESULTS.  VSS, NO GI BLEEDING, NO ABD. PAIN, NO N/V. PT. DISCHARGED FROM UNIT.

## 2023-10-31 NOTE — PLAN OF CARE
PATIENT WILL CALL BACK TO RESCHEDULE REPEAT EGD IN 2 MOS. PATIENT WAS GIVEN # TO SCHEDULERS OFFICE

## 2023-10-31 NOTE — PROVATION PATIENT INSTRUCTIONS
Discharge Summary/Instructions after an Endoscopic Procedure  Patient Name: Alondra Bryan  Patient MRN: [id number]  Patient YOB: 1960     Tuesday, October 31, 2023 An Edgar MD  Dear patient,  As a result of recent federal legislation (The Federal Cures Act), you may   receive lab or pathology results from your procedure in your [facility]   account before your physician is able to contact you. Your physician or   their representative will relay the results to you with their   recommendations at their soonest availability.  Thank you,  RESTRICTIONS:  During your procedure today, you received medications for sedation.  These   medications may affect your judgment, balance and coordination.  Therefore,   for 24 hours, you have the following restrictions:   - DO NOT drive a car, operate machinery, make legal/financial decisions,   sign important papers or drink alcohol.    ACTIVITY:  Today: no heavy lifting, straining or running due to procedural   sedation/anesthesia.  The following day: return to full activity including work.  DIET:  Eat and drink normally unless instructed otherwise.     TREATMENT FOR COMMON SIDE EFFECTS:  - Mild abdominal pain, nausea, belching, bloating or excessive gas:  rest,   eat lightly and use a heating pad.  - Sore Throat: treat with throat lozenges and/or gargle with warm salt   water.  - Because air was used during the procedure, expelling large amounts of air   from your rectum or belching is normal.  - If a bowel prep was taken, you may not have a bowel movement for 1-3 days.    This is normal.  SYMPTOMS TO WATCH FOR AND REPORT TO YOUR PHYSICIAN:  1. Abdominal pain or bloating, other than gas cramps.  2. Chest pain.  3. Back pain.  4. Signs of infection such as: chills or fever occurring within 24 hours   after the procedure.  5. Rectal bleeding, which would show as bright red, maroon, or black stools.   (A tablespoon of blood from the rectum is not serious, especially if    hemorrhoids are present.)  6. Vomiting.  7. Weakness or dizziness.  GO DIRECTLY TO THE NEAREST EMERGENCY ROOM IF YOU HAVE ANY OF THE FOLLOWING:      Difficulty breathing              Chills and/or fever over 101 F   Persistent vomiting and/or vomiting blood   Severe abdominal pain   Severe chest pain   Black, tarry stools   Bleeding- more than one tablespoon   Any other symptom or condition that you feel may need urgent attention  Your doctor recommends these additional instructions:  If any biopsies were taken, your doctors clinic will contact you in 1 to 2   weeks with any results.  - Discharge patient to home after colonoscopy.   - Resume previous diet.   - Continue present medications.   - Use Protonix (pantoprazole) 40 mg PO BID for 2 months.   - No aspirin, ibuprofen, naproxen, or other non-steroidal anti-inflammatory   drugs.   - Await pathology results.   - Repeat upper endoscopy in 2 months to check healing.   - Proceed with colonoscopy.  For questions, problems or results please call your physician An Edgar MD at Work:  [phone]  If you have any questions about the above instructions, call the GI   department at [phone] or call the endoscopy unit at [phone]   from 7am until 3 pm.  [facility] [place], EMERGENCY ROOM PHONE NUMBER:   [phone]  IF A COMPLICATION OR EMERGENCY SITUATION ARISES AND YOU ARE UNABLE TO REACH   YOUR PHYSICIAN - GO DIRECTLY TO THE EMERGENCY ROOM.  I have read or have had read to me these discharge instructions for my   procedure and have received a written copy.  I understand these   instructions and will follow-up with my physician if I have any questions.     __________________________________       _____________________________________  Nurse Signature                                          Patient/Designated   Responsible Party Signature  MD An Horowitz MD  10/31/2023 12:51:42 PM  This report has been  verified and signed electronically.  Dear patient,  As a result of recent federal legislation (The Federal Cures Act), you may   receive lab or pathology results from your procedure in your [facility]   account before your physician is able to contact you. Your physician or   their representative will relay the results to you with their   recommendations at their soonest availability.  Thank you,  PROVATION

## 2023-10-31 NOTE — H&P
PRE PROCEDURE H&P    Patient Name: Alondra Bryan  MRN: [id number]  : 1960  Date of Procedure:  10/31/2023  Referring Physician: Flor Callahan NP  Primary Physician: Tom Miles III, PA-C  Procedure Physician: An Edgar MD       Planned Procedure: Colonoscopy and EGD  Diagnosis: screening for colon cancer  and Oseguera's Esophagus    Chief Complaint: Same as above    HPI: Patient is an 63 y.o. female is here for the above. Last EGD . Focal intestinal metaplasia with no dysplasia appreciated. Recently prescribed Omeprazole but has not started it. Last colonoscopy . Rectal hyperplastic polyps. Fhx of maternal GF with CRC, diagnosed in his 80's. No blood thinners.  at bedside.     Last colonoscopy: 2018  Family history: maternal GF in his 80's  Anticoagulation: none    Past Medical History:   Past Medical History:   Diagnosis Date    Arthritis     Asthma     Colon polyp     GERD (gastroesophageal reflux disease)     Herpes simplex without mention of complication     Hypertension     Hypothyroidism     Lung mass     seeing pulmonology        Past Surgical History:  Past Surgical History:   Procedure Laterality Date    BREAST BIOPSY Right 2016    needle bx    BREAST BIOPSY Left 2016    needle bx    CHOLECYSTECTOMY N/A     COLONOSCOPY N/A 10/12/2018    Procedure: COLONOSCOPY;  Surgeon: Sam Mckeon MD;  Location: [facility];  Service: Endoscopy;  Laterality: N/A;    COLONOSCOPY  10/12/2018    Dr. Mckeon: 3 colon polyps removed, repeat in 5 years; biopsy HYPERPLASTIC POLYPs    ESOPHAGOGASTRODUODENOSCOPY N/A 7/3/2019    Procedure: EGD (ESOPHAGOGASTRODUODENOSCOPY);  Surgeon: Sam Mckeon MD;  Location: [facility];  Service: Endoscopy;  Laterality: N/A;    HYSTERECTOMY      [facility] for fibroids    LUNG SURGERY  2017    removed right lower lobe, unscuccesful, inflammation    SINUS SURGERY          Home Medications:  Prior to Admission medications    Medication Sig Start Date End  Date Taking? Authorizing Provider   RADHA THYROID 60 mg Tab 60 mg every morning.  12/16/16  Yes Provider, Historical   calcium carbonate (TUMS) 300 mg (750 mg) Chew Take by mouth daily as needed.   Yes Provider, Historical   DEPO-ESTRADIOL 5 mg/mL injection Inject into the muscle every 21 days. Patch twice a week; change Tuesday and Friday 6/9/22  Yes Provider, Historical   lisinopriL 10 MG tablet TAKE 1 TABLET (10 MG TOTAL) BY MOUTH ONCE DAILY. 9/1/23  Yes Tom Miles III, PA-C   meloxicam (MOBIC) 15 MG tablet TAKE 1 TABLET (15 MG TOTAL) BY MOUTH ONCE DAILY. 10/12/23  Yes Tom Miles III, PA-C   multivitamin with folic acid 400 mcg Tab Take 1 tablet by mouth every morning.    Yes Provider, Historical   progesterone (PROMETRIUM) 100 MG capsule Take 100 mg by mouth once daily.   Yes Provider, Historical   vitamin D (VITAMIN D3) 1000 units Tab Take 5,000 Units by mouth once daily.   Yes Provider, Historical   cyanocobalamin 1,000 mcg/mL injection 1,000 mcg every 28 days. Takes 1st of every month    Provider, Historical   doxycycline (ORACEA) 40 mg capsule Take 40 mg by mouth daily as needed (rosacea).  9/8/16   Provider, Historical   hyoscyamine (LEVBID) 0.375 mg Tb12 Take 1 tablet (0.375 mg total) by mouth every 12 (twelve) hours.  Patient not taking: Reported on 9/25/2023 9/14/23   Tom Miles III, PA-C   ibuprofen (ADVIL,MOTRIN) 100 MG tablet Take 100 mg by mouth every 6 (six) hours as needed for Temperature greater than.    Provider, Historical   omeprazole (PRILOSEC) 20 MG capsule Take 1 capsule (20 mg total) by mouth once daily. 9/25/23 12/24/23  Flor Callahan, NP   SOOLANTRA 1 % Crea every other day.  8/2/16   Provider, Historical   testosterone cypionate (DEPOTESTOTERONE CYPIONATE) 200 mg/mL injection INJECT 0.25 ML INTO THE MUSCLE EVERY 28 DAYS 8/12/19   Provider, Historical        Allergies:  Review of patient's allergies indicates:   Allergen Reactions    Pcn [penicillins] Hives     "    Social History:   Social History     Socioeconomic History    Marital status: Single    Number of children: 1   Occupational History    Occupation: retired     Employer: [employer]   Tobacco Use    Smoking status: Former     Current packs/day: 0.00     Average packs/day: 1 pack/day for 20.0 years (20.0 ttl pk-yrs)     Types: Cigarettes     Start date: 1964     Quit date: 1984     Years since quittin.8    Smokeless tobacco: Former    Tobacco comments:     quit 20 yrs ago   Substance and Sexual Activity    Alcohol use: Yes     Alcohol/week: 14.0 - 21.0 standard drinks of alcohol     Types: 14 - 21 Glasses of wine per week     Comment: social    Drug use: No    Sexual activity: Yes     Partners: Male     Birth control/protection: Surgical, Other-see comments     Comment: Hysterectomy   Social History Narrative    Patient is a [employer] worker.       Family History:  Family History   Problem Relation Age of Onset    Heart attack Father     Heart disease Father     Hypertension Brother     Lymphoma Sister     Hypertension Sister     Cancer Sister     Hypothyroidism Mother     Colon cancer Maternal Grandfather         dx 80    Breast cancer Neg Hx     Diabetes Neg Hx     Eclampsia Neg Hx     Miscarriages / Stillbirths Neg Hx     Ovarian cancer Neg Hx      labor Neg Hx     Stroke Neg Hx     Anesthesia problems Neg Hx     Crohn's disease Neg Hx     Ulcerative colitis Neg Hx     Stomach cancer Neg Hx     Esophageal cancer Neg Hx        ROS: No acute cardiac events, no acute respiratory complaints.     Physical Exam (all patients):    /77   Pulse 72   Temp 97.9 °F (36.6 °C) (Temporal)   Resp 18   Ht 5' 4" (1.626 m)   Wt 66.7 kg (147 lb)   SpO2 100%   Breastfeeding No   BMI 25.23 kg/m²   Lungs: Clear to auscultation bilaterally, respirations unlabored  Heart: Regular rate and rhythm, S1 and S2 normal, no obvious murmurs  Abdomen:         Soft, non-tender, bowel sounds normal, no " masses, no organomegaly    Lab Results   Component Value Date    WBC 10.18 09/14/2023    MCV 89 09/14/2023    RDW 12.6 09/14/2023     09/14/2023    INR 0.9 09/25/2023     09/14/2023    HGBA1C 5.6 09/14/2023    BUN 10 09/14/2023     09/14/2023    K 4.5 09/14/2023     09/14/2023        SEDATION PLAN: per anesthesia      History reviewed, vital signs satisfactory, cardiopulmonary status satisfactory, sedation options, risks and plans have been discussed with the patient  All their questions were answered and the patient agrees to the sedation procedures as planned and the patient is deemed an appropriate candidate for the sedation as planned.    The risks, benefits and alternatives of the procedure were discussed with the patient in detail. This discussion was had in the presence of her  and endoscopy staff. The risks include, risks of adverse reaction to sedation requiring the use of reversal agents, bleeding requiring blood transfusion, perforation requiring surgical intervention and technical failure. Other risks include aspiration leading to respiratory distress and respiratory failure resulting in endotracheal intubation and mechanical ventilation including death. If anesthesia is being utilized for this procedure, it is up to the anesthesiologist to determine airway safety including elective endotracheal intubation. Questions were answered, they agree to proceed. There was no language barriers.      Procedure explained to patient, informed consent obtained and placed in chart.    An Edgar  10/31/2023  12:07 PM

## 2023-10-31 NOTE — PROVATION PATIENT INSTRUCTIONS
Discharge Summary/Instructions after an Endoscopic Procedure  Patient Name: Alondra Bryan  Patient MRN: [id number]  Patient YOB: 1960     Tuesday, October 31, 2023 An Edgar MD  Dear patient,  As a result of recent federal legislation (The Federal Cures Act), you may   receive lab or pathology results from your procedure in your [facility]   account before your physician is able to contact you. Your physician or   their representative will relay the results to you with their   recommendations at their soonest availability.  Thank you,  RESTRICTIONS:  During your procedure today, you received medications for sedation.  These   medications may affect your judgment, balance and coordination.  Therefore,   for 24 hours, you have the following restrictions:   - DO NOT drive a car, operate machinery, make legal/financial decisions,   sign important papers or drink alcohol.    ACTIVITY:  Today: no heavy lifting, straining or running due to procedural   sedation/anesthesia.  The following day: return to full activity including work.  DIET:  Eat and drink normally unless instructed otherwise.     TREATMENT FOR COMMON SIDE EFFECTS:  - Mild abdominal pain, nausea, belching, bloating or excessive gas:  rest,   eat lightly and use a heating pad.  - Sore Throat: treat with throat lozenges and/or gargle with warm salt   water.  - Because air was used during the procedure, expelling large amounts of air   from your rectum or belching is normal.  - If a bowel prep was taken, you may not have a bowel movement for 1-3 days.    This is normal.  SYMPTOMS TO WATCH FOR AND REPORT TO YOUR PHYSICIAN:  1. Abdominal pain or bloating, other than gas cramps.  2. Chest pain.  3. Back pain.  4. Signs of infection such as: chills or fever occurring within 24 hours   after the procedure.  5. Rectal bleeding, which would show as bright red, maroon, or black stools.   (A tablespoon of blood from the rectum is not serious, especially if    hemorrhoids are present.)  6. Vomiting.  7. Weakness or dizziness.  GO DIRECTLY TO THE NEAREST EMERGENCY ROOM IF YOU HAVE ANY OF THE FOLLOWING:      Difficulty breathing              Chills and/or fever over 101 F   Persistent vomiting and/or vomiting blood   Severe abdominal pain   Severe chest pain   Black, tarry stools   Bleeding- more than one tablespoon   Any other symptom or condition that you feel may need urgent attention  Your doctor recommends these additional instructions:  If any biopsies were taken, your doctors clinic will contact you in 1 to 2   weeks with any results.  - Discharge patient to home (with escort).   - Resume previous diet.   - Continue present medications.   - Repeat colonoscopy in 7 years for screening purposes.   - Return to primary care physician.  For questions, problems or results please call your physician An Edgar MD at Work:  [phone]  If you have any questions about the above instructions, call the GI   department at [phone] or call the endoscopy unit at [phone]   from 7am until 3 pm.  [facility], EMERGENCY ROOM PHONE NUMBER:   [phone]  IF A COMPLICATION OR EMERGENCY SITUATION ARISES AND YOU ARE UNABLE TO REACH   YOUR PHYSICIAN - GO DIRECTLY TO THE EMERGENCY ROOM.  I have read or have had read to me these discharge instructions for my   procedure and have received a written copy.  I understand these   instructions and will follow-up with my physician if I have any questions.     __________________________________       _____________________________________  Nurse Signature                                          Patient/Designated   Responsible Party Signature  MD An Horowitz MD  10/31/2023 12:54:37 PM  This report has been verified and signed electronically.  Dear patient,  As a result of recent federal legislation (The Federal Cures Act), you may   receive lab or pathology results from your  procedure in your [facility]   account before your physician is able to contact you. Your physician or   their representative will relay the results to you with their   recommendations at their soonest availability.  Thank you,  PROVATION

## 2023-11-01 VITALS
BODY MASS INDEX: 25.1 KG/M2 | SYSTOLIC BLOOD PRESSURE: 119 MMHG | HEART RATE: 71 BPM | HEIGHT: 64 IN | OXYGEN SATURATION: 100 % | WEIGHT: 147 LBS | DIASTOLIC BLOOD PRESSURE: 62 MMHG | TEMPERATURE: 98 F | RESPIRATION RATE: 17 BRPM

## 2023-11-06 ENCOUNTER — TELEPHONE (OUTPATIENT)
Dept: GASTROENTEROLOGY | Facility: CLINIC | Age: 63
End: 2023-11-06
Payer: COMMERCIAL

## 2023-11-06 LAB
FINAL PATHOLOGIC DIAGNOSIS: NORMAL
GROSS: NORMAL
Lab: NORMAL
SUPPLEMENTAL DIAGNOSIS: NORMAL

## 2023-11-06 NOTE — PROGRESS NOTES
The biopsies of your stomach show no signs of infection.     The biopsies of your swallowing pipe are consistent with Oseguera's esophagus. This is a condition that affects the esophagus, which is the tube that carries food from the mouth to the stomach. When a person has Oseguera's esophagus, the normal cells in the lower part of their esophagus are replaced by a different type of cell. Oseguera's esophagus can later turn into pre-cancer or cancer of the esophagus. There was no concerns for cancer with these biopsies. Guidelines recommend we repeat a upper endoscopy in 3 year. We will send you a reminder as the time nears. Please continue to take your acid reducer (Omeprazole) as prescribed.     Thanks for trusting us with your healthcare needs and using [facility].     Sincerely,    An Edgar M.D.

## 2023-11-06 NOTE — TELEPHONE ENCOUNTER
Called pt and covered the results and medication expectations.  The pt stated she understood and had no further questions at this time.

## 2023-11-06 NOTE — TELEPHONE ENCOUNTER
----- Message from An Edgar MD sent at 11/6/2023  3:32 PM CST -----  The biopsies of your stomach show no signs of infection.     The biopsies of your swallowing pipe are consistent with Oseguera's esophagus. This is a condition that affects the esophagus, which is the tube that carries food from the mouth to the stomach. When a person has Oseguera's esophagus, the normal cells in the lower part of their esophagus are replaced by a different type of cell. Oseguera's esophagus can later turn into pre-cancer or cancer of the esophagus. There was no concerns for cancer with these biopsies. Guidelines recommend we repeat a upper endoscopy in 3 year. We will send you a reminder as the time nears. Please continue to take your acid reducer (Omeprazole) as prescribed.     Thanks for trusting us with your healthcare needs and using [facility].     Sincerely,    An Edgar M.D.

## 2023-11-20 ENCOUNTER — PATIENT MESSAGE (OUTPATIENT)
Dept: ENDOSCOPY | Facility: HOSPITAL | Age: 63
End: 2023-11-20
Payer: COMMERCIAL

## 2023-12-28 NOTE — PLAN OF CARE
Problem: Patient Care Overview  Goal: Plan of Care Review  Outcome: Ongoing (interventions implemented as appropriate)  POC reviewed with patient who verbalized understanding. Pt 's right chest tubes draining sanguinous drainage, both set to wall suction. Pt on room air, O2 saturations within range.  Pain being controlled with PCA pain medication. Minimal nausea during the night shift, PRN Zofran given.  Pt remained free from falls throughout the shift VSS. No distress noted, will continue to monitor.         DISPLAY PLAN FREE TEXT

## 2024-04-10 DIAGNOSIS — Z78.0 MENOPAUSE: ICD-10-CM

## 2024-09-23 DIAGNOSIS — I10 PRIMARY HYPERTENSION: ICD-10-CM

## 2024-09-23 RX ORDER — MELOXICAM 15 MG/1
15 TABLET ORAL
Qty: 90 TABLET | Refills: 0 | Status: SHIPPED | OUTPATIENT
Start: 2024-09-23

## 2024-09-23 RX ORDER — LISINOPRIL 10 MG/1
10 TABLET ORAL
Qty: 90 TABLET | Refills: 0 | Status: SHIPPED | OUTPATIENT
Start: 2024-09-23

## 2024-09-25 DIAGNOSIS — I10 HYPERTENSION: ICD-10-CM

## 2024-09-30 ENCOUNTER — OFFICE VISIT (OUTPATIENT)
Dept: PRIMARY CARE CLINIC | Facility: CLINIC | Age: 64
End: 2024-09-30
Payer: MEDICARE

## 2024-09-30 VITALS
HEIGHT: 64 IN | OXYGEN SATURATION: 97 % | BODY MASS INDEX: 28.49 KG/M2 | SYSTOLIC BLOOD PRESSURE: 135 MMHG | HEART RATE: 71 BPM | DIASTOLIC BLOOD PRESSURE: 80 MMHG | WEIGHT: 166.88 LBS

## 2024-09-30 DIAGNOSIS — J45.20 MILD INTERMITTENT ASTHMA WITHOUT COMPLICATION: ICD-10-CM

## 2024-09-30 DIAGNOSIS — I10 PRIMARY HYPERTENSION: Primary | ICD-10-CM

## 2024-09-30 PROCEDURE — 99214 OFFICE O/P EST MOD 30 MIN: CPT | Mod: S$GLB,,, | Performed by: PHYSICIAN ASSISTANT

## 2024-09-30 NOTE — PROGRESS NOTES
Subjective     Patient ID: Alondra Bryan is a 64 y.o. female.    Chief Complaint: Follow-up    Patient is a 63 yo female who comes into day for for a check up. She voices no acute complaints.     Patient Active Problem List:     Hypertension     Family history of early CAD     Hallux abducto valgus     Neuroma     Granulomatous lung disease     Acid reflux     Mild intermittent asthma without complication     Over weight     PONV (postoperative nausea and vomiting)     Hormone replacement therapy (HRT)     Atypical chest pain     Lung nodule     Thyroid nodule     Renal angiomyolipoma     Hx of colonic polyps     Epigastric pain     Oseguera's esophagus without dysplasia    Followed by GYN and GI conditions are stable    Follow-up  Pertinent negatives include no abdominal pain, chest pain, chills or fatigue.     Review of Systems   Constitutional:  Negative for chills, fatigue and night sweats.   Respiratory:  Negative for chest tightness, shortness of breath and wheezing.    Cardiovascular:  Negative for chest pain.   Gastrointestinal:  Negative for abdominal pain and reflux.   Musculoskeletal:  Negative for back pain.          Objective     Physical Exam  Vitals reviewed.   Constitutional:       General: She is not in acute distress.     Appearance: Normal appearance. She is not ill-appearing, toxic-appearing or diaphoretic.   HENT:      Head: Normocephalic and atraumatic.      Right Ear: Tympanic membrane, ear canal and external ear normal. There is no impacted cerumen.      Left Ear: Tympanic membrane, ear canal and external ear normal. There is no impacted cerumen.      Nose: Nose normal. No congestion or rhinorrhea.      Mouth/Throat:      Mouth: Mucous membranes are moist.      Pharynx: No oropharyngeal exudate.   Neck:      Vascular: No carotid bruit.   Cardiovascular:      Rate and Rhythm: Normal rate and regular rhythm.      Pulses: Normal pulses.      Heart sounds: Normal heart sounds. No murmur heard.      No friction rub. No gallop.   Pulmonary:      Effort: Pulmonary effort is normal. No respiratory distress.      Breath sounds: Normal breath sounds. No stridor. No wheezing, rhonchi or rales.   Chest:      Chest wall: No tenderness.   Abdominal:      General: There is no distension.      Palpations: Abdomen is soft. There is no mass.      Tenderness: There is no abdominal tenderness. There is no right CVA tenderness, left CVA tenderness, guarding or rebound.      Hernia: No hernia is present.   Musculoskeletal:      Cervical back: No rigidity or tenderness.      Right lower leg: No edema.      Left lower leg: No edema.   Lymphadenopathy:      Cervical: No cervical adenopathy.   Skin:     General: Skin is warm and dry.   Neurological:      Mental Status: She is alert.            Assessment and Plan     1. Primary hypertension  The current medical regimen is effective;  continue present plan and medications.     2. Mild intermittent asthma without complication    The current medical regimen is effective;  continue present plan and medications.     I spent 30 minutes on this encounter, time includes face-to-face, chart review, documentation, test review and orders.       Patient reports getting recent lab work from [facility]. She states that she will get a copy to look at.     Fu 1 year

## 2024-12-25 NOTE — ANESTHESIA POSTPROCEDURE EVALUATION
"Anesthesia Post Evaluation    Patient: Alondra Bryan    Procedure(s) Performed: Procedure(s) (LRB):  ENDOBRONCHIAL NAVIGATION (N/A)  ENDOBRONCHIAL ULTRASOUND (EBUS) (N/A)  BRONCHOSCOPY (N/A)    Final Anesthesia Type: general  Patient location during evaluation: PACU  Patient participation: Yes- Able to Participate  Level of consciousness: awake and alert  Post-procedure vital signs: reviewed and stable  Pain management: adequate  Airway patency: patent  PONV status at discharge: No PONV  Anesthetic complications: no      Cardiovascular status: blood pressure returned to baseline and hemodynamically stable  Respiratory status: unassisted, spontaneous ventilation and room air  Hydration status: euvolemic  Follow-up not needed.        Visit Vitals    /73 (BP Location: Left arm, Patient Position: Lying, BP Method: Automatic)    Pulse 73    Temp 36.6 °C (97.8 °F) (Oral)    Resp 16    Ht 5' 3" (1.6 m)    Wt 72.6 kg (160 lb)    SpO2 96%    Breastfeeding No    BMI 28.34 kg/m2       Pain/Taylor Score: Pain Assessment Performed: Yes (4/11/2017 11:20 AM)  Presence of Pain: complains of pain/discomfort (4/11/2017 11:05 AM)  Pain Rating Prior to Med Admin: 7 (4/11/2017 11:20 AM)  Pain Rating Post Med Admin: 3 (4/11/2017 11:30 AM)  Taylor Score: 10 (4/11/2017 11:05 AM)      "
patient representative

## (undated) DEVICE — DRAPE STERI INSTRUMENT 1018

## (undated) DEVICE — SUT STRATAFIX PGAPCL 3-0 FS

## (undated) DEVICE — SEE MEDLINE ITEM 157117

## (undated) DEVICE — LUBRICANT SURGILUBE 2 OZ

## (undated) DEVICE — RELOAD ECHELON ENDOPATH 45MM

## (undated) DEVICE — GAUZE SPONGE 4X4 12PLY

## (undated) DEVICE — HEMOSTAT SURGICEL 4X8IN

## (undated) DEVICE — ELECTRODE REM PLYHSV RETURN 9

## (undated) DEVICE — DRESSING TRANS 6X8 TEGADERM

## (undated) DEVICE — SUT 2/0 30IN SILK BLK BRAI

## (undated) DEVICE — TAPE MEDIPORE 4IN X 2YDS

## (undated) DEVICE — SYR SLIP TIP 20CC

## (undated) DEVICE — CONTAINER SPECIMEN STRL 4OZ

## (undated) DEVICE — Device

## (undated) DEVICE — STAPLER ECHELON FLEX GST 45MM

## (undated) DEVICE — SEE MEDLINE ITEM 146313

## (undated) DEVICE — DRESSING TELFA STRL 4X3 LF

## (undated) DEVICE — DRAPE INCISE IOBAN 2 23X17IN

## (undated) DEVICE — PLEDGET SUT SOFT 3/8X3/16X1/16

## (undated) DEVICE — BLADE ELECTRO EDGE INSULATED

## (undated) DEVICE — PACK SET UP CONVERTORS

## (undated) DEVICE — NDL ASPIRATING VIZISHOT 20-40M

## (undated) DEVICE — SUT SILK 0 STRANDS 30IN BLK

## (undated) DEVICE — DRAPE ABDOMINAL TIBURON 14X11

## (undated) DEVICE — FORCEP SERR TIP TRACKED DISP

## (undated) DEVICE — DRAIN CHEST DRY SUCTION

## (undated) DEVICE — SUT VICRYL 3-0 27 SH

## (undated) DEVICE — SEE MEDLINE ITEM 152622

## (undated) DEVICE — ADAPTER SWIVEL

## (undated) DEVICE — ELECTRODE BLADE INSULATED 1 IN

## (undated) DEVICE — LOOP VESSEL BLUE MAXI

## (undated) DEVICE — TRACKER PATIENT VPAD

## (undated) DEVICE — TRAY MINOR GEN SURG

## (undated) DEVICE — SYR SLIP TIP 10ML SHIELD

## (undated) DEVICE — SUT STRATAFIX PDO 1 OS-6

## (undated) DEVICE — TRAY CATH UM FOLEY SIL W 16FR

## (undated) DEVICE — NDL CYTOLOGY TIP TRACK 21GA

## (undated) DEVICE — SEE MEDLINE ITEM 157131